# Patient Record
Sex: FEMALE | Race: BLACK OR AFRICAN AMERICAN | Employment: OTHER | URBAN - METROPOLITAN AREA
[De-identification: names, ages, dates, MRNs, and addresses within clinical notes are randomized per-mention and may not be internally consistent; named-entity substitution may affect disease eponyms.]

---

## 2017-07-03 DIAGNOSIS — I25.5 ISCHEMIC CARDIOMYOPATHY: ICD-10-CM

## 2017-07-03 DIAGNOSIS — I50.22 CHRONIC SYSTOLIC CONGESTIVE HEART FAILURE (H): ICD-10-CM

## 2017-07-03 RX ORDER — FUROSEMIDE 20 MG
20 TABLET ORAL EVERY OTHER DAY
Qty: 45 TABLET | Refills: 1 | Status: SHIPPED | OUTPATIENT
Start: 2017-07-03 | End: 2017-10-08

## 2017-07-24 ENCOUNTER — OFFICE VISIT (OUTPATIENT)
Dept: OPHTHALMOLOGY | Facility: CLINIC | Age: 77
End: 2017-07-24
Attending: OPHTHALMOLOGY
Payer: MEDICARE

## 2017-07-24 DIAGNOSIS — H52.203 HYPEROPIC ASTIGMATISM OF BOTH EYES: ICD-10-CM

## 2017-07-24 DIAGNOSIS — H52.4 PRESBYOPIA: ICD-10-CM

## 2017-07-24 PROCEDURE — 92015 DETERMINE REFRACTIVE STATE: CPT | Mod: GY,ZF

## 2017-07-24 PROCEDURE — 99213 OFFICE O/P EST LOW 20 MIN: CPT | Mod: ZF

## 2017-07-24 ASSESSMENT — CUP TO DISC RATIO
OS_RATIO: 0.25
OD_RATIO: 0.25

## 2017-07-24 ASSESSMENT — SLIT LAMP EXAM - LIDS
COMMENTS: NORMAL
COMMENTS: NORMAL

## 2017-07-24 ASSESSMENT — REFRACTION_WEARINGRX
OD_AXIS: 024
OD_SPHERE: +1.75
OD_CYLINDER: +0.50
OD_ADD: +2.75
OS_SPHERE: +1.75
OS_CYLINDER: SPHERE
OS_ADD: +2.75
SPECS_TYPE: PAL

## 2017-07-24 ASSESSMENT — REFRACTION_MANIFEST
OS_ADD: +2.75
OS_AXIS: 115
OD_SPHERE: +1.50
OD_AXIS: 050
OD_CYLINDER: +0.75
OS_CYLINDER: +0.50
OD_ADD: +2.75
OS_SPHERE: +2.00

## 2017-07-24 ASSESSMENT — EXTERNAL EXAM - LEFT EYE: OS_EXAM: NORMAL

## 2017-07-24 ASSESSMENT — VISUAL ACUITY
OS_CC: 20/50
CORRECTION_TYPE: GLASSES
OD_CC: 20/50
METHOD: SNELLEN - LINEAR
OD_CC+: +1
OS_CC+: +/-

## 2017-07-24 ASSESSMENT — CONF VISUAL FIELD
OS_NORMAL: 1
OD_NORMAL: 1
METHOD: COUNTING FINGERS

## 2017-07-24 ASSESSMENT — TONOMETRY
OS_IOP_MMHG: 13
OD_IOP_MMHG: 15
IOP_METHOD: TONOPEN

## 2017-07-24 ASSESSMENT — EXTERNAL EXAM - RIGHT EYE: OD_EXAM: NORMAL

## 2017-07-24 NOTE — NURSING NOTE
Chief Complaints and History of Present Illnesses   Patient presents with     Follow Up For     cataracts     HPI    Affected eye(s):  Both   Symptoms:     Blurred vision   Decreased vision         Do you have eye pain now?:  No      Comments:  Junie GAYTAN 2:17 PM July 24, 2017

## 2017-07-24 NOTE — MR AVS SNAPSHOT
After Visit Summary   7/24/2017    Jaja Ernandez    MRN: [id number]           Patient Information     Date Of Birth          1940        Visit Information        Provider Department      7/24/2017 2:00 PM [facility]        Today's Diagnoses     Nuclear cataract of both eyes    -  1    Hyperopic astigmatism of both eyes        Presbyopia           Follow-ups after your visit        Follow-up notes from your care team     Return in about 1 year (around 7/24/2018).      Who to contact     Please call your clinic at [phone] to:    Ask questions about your health    Make or cancel appointments    Discuss your medicines    Learn about your test results    Speak to your doctor   If you have compliments or concerns about an experience at your clinic, or if you wish to file a complaint, please contact [facility] Patient Relations at [phone] or email us at [email or web address]         Additional Information About Your Visit        MyChart Information     [employer] gives you secure access to your electronic health record. If you see a primary care provider, you can also send messages to your care team and make appointments. If you have questions, please call your primary care clinic.  If you do not have a primary care provider, please call [phone] and they will assist you.      [employer] is an electronic gateway that provides easy, online access to your medical records. With [employer], you can request a clinic appointment, read your test results, renew a prescription or communicate with your care team.     To access your existing account, please contact your [facility] or call [phone] for assistance.        Care EveryWhere ID     This is your Care EveryWhere ID. This could be used by other organizations to access your [place] medical records  [id number]         Blood Pressure from Last 3 Encounters:    10/06/16 122/80   04/05/16 117/77   12/04/15 126/79    Weight from Last 3 Encounters:   10/06/16 89.7 kg (197 lb 11.2 oz)   04/05/16 87.1 kg (192 lb)   12/04/15 85.5 kg (188 lb 9.6 oz)              Today, you had the following     No orders found for display       Primary Care Provider Office Phone # Fax #    Dave Correa [phone]       [address]  [facility] [address]        Equal Access to Services     [facility] Hospital[facility] : Hadii harshal brunson hadjazmin Sodontrell, wajonathanda luqadaha, qaybta kaalmada candie, jaren carver . So [facility] [phone].    ATENCIÓN: Si habla español, tiene a richetr disposición servicios gratuitos de asistencia lingüística. [facility] [phone].    We comply with applicable federal civil rights laws and [place] laws. We do not discriminate on the basis of race, color, national origin, age, disability sex, sexual orientation or gender identity.            Thank you!     Thank you for choosing [facility]  for your care. Our goal is always to provide you with excellent care. Hearing back from our patients is one way we can continue to improve our services. Please take a few minutes to complete the written survey that you may receive in the mail after your visit with us. Thank you!             Your Updated Medication List - Protect others around you: Learn how to safely use, store and throw away your medicines at www.disposemymeds.org.          This list is accurate as of: 7/24/17 11:59 PM.  Always use your most recent med list.                   Brand Name Dispense Instructions for use Diagnosis    amLODIPine 10 MG tablet    NORVASC    90 tablet    Take 1 tablet (10 mg) by mouth daily    Hypertension goal BP (blood pressure) < 140/90       aspirin 81 MG tablet      Take 81 mg by mouth daily        atorvastatin 20 MG tablet    LIPITOR    90 tablet    Take 1 tablet (20 mg) by mouth daily    Coronary artery disease involving  native coronary artery of native heart without angina pectoris       BC HEADACHE POWDER PO      Take 1 powder on the tongue every 6 hours with water as needed for headache        calcium-vitamin D 600-400 MG-UNIT per tablet    CALTRATE     Take 1 tablet by mouth daily        carboxymethylcellulose 0.5 % Soln ophthalmic solution    REFRESH PLUS     Place 1 drop into both eyes 3 times daily as needed for dry eyes        carvedilol 25 MG tablet    COREG    180 tablet    TAKE ONE TABLET (25MG) BY MOUTH TWICE DAILY WITH MEALS    HTN (hypertension)       CENTRUM SILVER per tablet      Take 1 tablet by mouth daily        docusate sodium 100 MG capsule    COLACE    60 capsule    Take 1 capsule (100 mg) by mouth 2 times daily as needed for constipation    Slow transit constipation       ferrous sulfate 325 (65 FE) MG tablet    IRON    60 tablet    Take 1 tablet (325 mg) by mouth 2 times daily    Iron deficiency anemia       furosemide 20 MG tablet    LASIX    45 tablet    Take 1 tablet (20 mg) by mouth every other day    Chronic systolic congestive heart failure (H), Ischemic cardiomyopathy       garlic 150 MG Tabs tablet      Take 150 mg by mouth daily        isosorbide mononitrate 60 MG 24 hr tablet    IMDUR    90 tablet    Take 1 tablet (60 mg) by mouth daily    Hypertension goal BP (blood pressure) < 140/90       lisinopril 40 MG tablet    PRINIVIL/ZESTRIL    90 tablet    Take 1 tablet (40 mg) by mouth daily    Acute systolic heart failure (H), Dyspnea, Acute kidney injury (nontraumatic) (H), CKD (chronic kidney disease) stage 3, GFR 30-59 ml/min       spironolactone 25 MG tablet    ALDACTONE    45 tablet    Take 0.5 tablets (12.5 mg) by mouth daily    Ischemic cardiomyopathy, Acute systolic heart failure (H)       VITAMIN C PO      Take 500 mg by mouth daily        vitamin E 400 UNIT capsule   Generic drug:  vitamin E      Take 400 Units by mouth daily

## 2017-07-31 NOTE — PROGRESS NOTES
HPI  Jaja Ernandez is a 77 year old female here for full eye exam. She has noted a mild continued decrease in her vision in both eyes at near and distance over the last couple of years. Her current glasses don't help as much they used to. No pain, redness, discharge. No flashes/floaters.    Assessment & Plan      (H25.13) Nuclear cataract of both eyes  (primary encounter diagnosis)  Comment: Visually significant with BCVA of 20/40 and 20/50, but she is not bothered and does not feel ready for surgery.  Plan: Observe for now    (H52.203) Hyperopic astigmatism of both eyes/(H52.4) Presbyopia  Comment: Mild improvement in vision with glasses, limited by cataract  Plan: Given updated glasses Rx.      -----------------------------------------------------------------------------------    Patient disposition:   Return in about 1 year (around 7/24/2018). or sooner as needed.    Teaching statement:  Complete documentation of historical and exam elements from today's encounter can be found in the full encounter summary report (not reduplicated in this progress note). I personally obtained the chief complaint(s) and history of present illness.  I confirmed and edited as necessary the review of systems, past medical/surgical history, family history, social history, and examination findings as documented by others; and I examined the patient myself. I personally reviewed the relevant tests, images, and reports as documented above.     I formulated and edited as necessary the assessment and plan and discussed the findings and management plan with the patient and family.    Andreia Montejo MD  Comprehensive Ophthalmology & Ocular Pathology  Department of Ophthalmology and Visual Neurosciences  [email or web address]  Pager [phone]

## 2017-10-08 DIAGNOSIS — I50.22 CHRONIC SYSTOLIC CONGESTIVE HEART FAILURE (H): ICD-10-CM

## 2017-10-08 DIAGNOSIS — R06.00 DYSPNEA: ICD-10-CM

## 2017-10-08 DIAGNOSIS — N17.9 ACUTE KIDNEY INJURY (NONTRAUMATIC) (H): ICD-10-CM

## 2017-10-08 DIAGNOSIS — I50.21 ACUTE SYSTOLIC HEART FAILURE (H): ICD-10-CM

## 2017-10-08 DIAGNOSIS — N18.30 CKD (CHRONIC KIDNEY DISEASE) STAGE 3, GFR 30-59 ML/MIN (H): ICD-10-CM

## 2017-10-08 DIAGNOSIS — I25.5 ISCHEMIC CARDIOMYOPATHY: ICD-10-CM

## 2017-10-09 RX ORDER — LISINOPRIL 40 MG/1
TABLET ORAL
Qty: 90 TABLET | Refills: 0 | Status: ON HOLD | OUTPATIENT
Start: 2017-10-09 | End: 2018-07-02

## 2017-10-09 RX ORDER — FUROSEMIDE 20 MG
TABLET ORAL
Qty: 45 TABLET | Refills: 0 | Status: ON HOLD | OUTPATIENT
Start: 2017-10-09 | End: 2018-07-02

## 2017-11-29 DIAGNOSIS — I50.21 ACUTE SYSTOLIC HEART FAILURE (H): ICD-10-CM

## 2017-11-29 DIAGNOSIS — I25.5 ISCHEMIC CARDIOMYOPATHY: ICD-10-CM

## 2017-12-14 RX ORDER — SPIRONOLACTONE 25 MG/1
12.5 TABLET ORAL DAILY
Qty: 45 TABLET | Refills: 3 | OUTPATIENT
Start: 2017-12-14

## 2017-12-19 DIAGNOSIS — I25.10 CORONARY ARTERY DISEASE INVOLVING NATIVE CORONARY ARTERY OF NATIVE HEART WITHOUT ANGINA PECTORIS: ICD-10-CM

## 2017-12-19 RX ORDER — ATORVASTATIN CALCIUM 20 MG/1
TABLET, FILM COATED ORAL
Qty: 30 TABLET | Refills: 0 | Status: ON HOLD | OUTPATIENT
Start: 2017-12-19 | End: 2018-07-02

## 2018-01-06 DIAGNOSIS — N18.30 CKD (CHRONIC KIDNEY DISEASE) STAGE 3, GFR 30-59 ML/MIN (H): ICD-10-CM

## 2018-01-06 DIAGNOSIS — R06.00 DYSPNEA: ICD-10-CM

## 2018-01-06 DIAGNOSIS — I50.21 ACUTE SYSTOLIC HEART FAILURE (H): ICD-10-CM

## 2018-01-06 DIAGNOSIS — N17.9 ACUTE KIDNEY INJURY (NONTRAUMATIC) (H): ICD-10-CM

## 2018-01-08 DIAGNOSIS — I50.22 CHRONIC SYSTOLIC CONGESTIVE HEART FAILURE (H): Primary | ICD-10-CM

## 2018-01-08 RX ORDER — LISINOPRIL 40 MG/1
TABLET ORAL
Qty: 90 TABLET | Refills: 0 | OUTPATIENT
Start: 2018-01-08

## 2018-01-27 DIAGNOSIS — I50.21 ACUTE SYSTOLIC HEART FAILURE (H): ICD-10-CM

## 2018-01-27 DIAGNOSIS — N18.30 CKD (CHRONIC KIDNEY DISEASE) STAGE 3, GFR 30-59 ML/MIN (H): ICD-10-CM

## 2018-01-27 DIAGNOSIS — I25.10 CORONARY ARTERY DISEASE INVOLVING NATIVE CORONARY ARTERY OF NATIVE HEART WITHOUT ANGINA PECTORIS: ICD-10-CM

## 2018-01-27 DIAGNOSIS — R06.00 DYSPNEA: ICD-10-CM

## 2018-01-27 DIAGNOSIS — N17.9 ACUTE KIDNEY INJURY (NONTRAUMATIC) (H): ICD-10-CM

## 2018-01-29 RX ORDER — LISINOPRIL 40 MG/1
TABLET ORAL
Qty: 90 TABLET | Refills: 0 | OUTPATIENT
Start: 2018-01-29

## 2018-01-29 RX ORDER — ATORVASTATIN CALCIUM 20 MG/1
TABLET, FILM COATED ORAL
Qty: 30 TABLET | Refills: 0 | OUTPATIENT
Start: 2018-01-29

## 2018-06-29 ENCOUNTER — APPOINTMENT (OUTPATIENT)
Dept: CARDIOLOGY | Facility: CLINIC | Age: 78
DRG: 280 | End: 2018-06-29
Attending: FAMILY MEDICINE
Payer: MEDICARE

## 2018-06-29 ENCOUNTER — APPOINTMENT (OUTPATIENT)
Dept: GENERAL RADIOLOGY | Facility: CLINIC | Age: 78
DRG: 280 | End: 2018-06-29
Attending: FAMILY MEDICINE
Payer: MEDICARE

## 2018-06-29 ENCOUNTER — HOSPITAL ENCOUNTER (INPATIENT)
Facility: CLINIC | Age: 78
LOS: 3 days | Discharge: HOME | DRG: 280 | End: 2018-07-02
Attending: FAMILY MEDICINE | Admitting: INTERNAL MEDICINE
Payer: MEDICARE

## 2018-06-29 DIAGNOSIS — I50.22 CHRONIC SYSTOLIC CONGESTIVE HEART FAILURE (H): ICD-10-CM

## 2018-06-29 DIAGNOSIS — N17.9 ACUTE RENAL FAILURE SUPERIMPOSED ON STAGE 4 CHRONIC KIDNEY DISEASE, UNSPECIFIED ACUTE RENAL FAILURE TYPE (H): ICD-10-CM

## 2018-06-29 DIAGNOSIS — N18.4 ACUTE RENAL FAILURE SUPERIMPOSED ON STAGE 4 CHRONIC KIDNEY DISEASE, UNSPECIFIED ACUTE RENAL FAILURE TYPE (H): ICD-10-CM

## 2018-06-29 DIAGNOSIS — D63.1 ANEMIA IN STAGE 3 CHRONIC KIDNEY DISEASE (H): Primary | ICD-10-CM

## 2018-06-29 DIAGNOSIS — R79.89 ELEVATED BRAIN NATRIURETIC PEPTIDE (BNP) LEVEL: ICD-10-CM

## 2018-06-29 DIAGNOSIS — R06.09 DOE (DYSPNEA ON EXERTION): ICD-10-CM

## 2018-06-29 DIAGNOSIS — R79.89 ELEVATED TROPONIN: ICD-10-CM

## 2018-06-29 DIAGNOSIS — N18.30 ANEMIA IN STAGE 3 CHRONIC KIDNEY DISEASE (H): Primary | ICD-10-CM

## 2018-06-29 DIAGNOSIS — I50.1 ACUTE PULMONARY EDEMA WITH CONGESTIVE HEART FAILURE (H): ICD-10-CM

## 2018-06-29 DIAGNOSIS — I25.5 ISCHEMIC CARDIOMYOPATHY: ICD-10-CM

## 2018-06-29 DIAGNOSIS — I50.23: ICD-10-CM

## 2018-06-29 DIAGNOSIS — R05.9 COUGH: ICD-10-CM

## 2018-06-29 PROBLEM — I50.9 HEART FAILURE (H): Status: ACTIVE | Noted: 2018-06-29

## 2018-06-29 LAB
ALBUMIN SERPL-MCNC: 3.3 G/DL (ref 3.4–5)
ALBUMIN UR-MCNC: 100 MG/DL
ALP SERPL-CCNC: 145 U/L (ref 40–150)
ALT SERPL W P-5'-P-CCNC: 254 U/L (ref 0–50)
ANION GAP SERPL CALCULATED.3IONS-SCNC: 11 MMOL/L (ref 3–14)
ANION GAP SERPL CALCULATED.3IONS-SCNC: 12 MMOL/L (ref 3–14)
APPEARANCE UR: CLEAR
APTT PPP: 33 SEC (ref 22–37)
AST SERPL W P-5'-P-CCNC: 241 U/L (ref 0–45)
BASOPHILS # BLD AUTO: 0.1 10E9/L (ref 0–0.2)
BASOPHILS NFR BLD AUTO: 1.2 %
BILIRUB SERPL-MCNC: 1 MG/DL (ref 0.2–1.3)
BILIRUB UR QL STRIP: NEGATIVE
BUN SERPL-MCNC: 32 MG/DL (ref 7–30)
BUN SERPL-MCNC: 34 MG/DL (ref 7–30)
CALCIUM SERPL-MCNC: 8.8 MG/DL (ref 8.5–10.1)
CALCIUM SERPL-MCNC: 8.8 MG/DL (ref 8.5–10.1)
CHLORIDE SERPL-SCNC: 106 MMOL/L (ref 94–109)
CHLORIDE SERPL-SCNC: 107 MMOL/L (ref 94–109)
CO2 SERPL-SCNC: 24 MMOL/L (ref 20–32)
CO2 SERPL-SCNC: 25 MMOL/L (ref 20–32)
COLOR UR AUTO: ABNORMAL
CREAT SERPL-MCNC: 3.34 MG/DL (ref 0.52–1.04)
CREAT SERPL-MCNC: 3.53 MG/DL (ref 0.52–1.04)
DIFFERENTIAL METHOD BLD: ABNORMAL
EOSINOPHIL # BLD AUTO: 0.1 10E9/L (ref 0–0.7)
EOSINOPHIL NFR BLD AUTO: 1.4 %
ERYTHROCYTE [DISTWIDTH] IN BLOOD BY AUTOMATED COUNT: 16.4 % (ref 10–15)
GFR SERPL CREATININE-BSD FRML MDRD: 13 ML/MIN/1.7M2
GFR SERPL CREATININE-BSD FRML MDRD: 13 ML/MIN/1.7M2
GLUCOSE SERPL-MCNC: 125 MG/DL (ref 70–99)
GLUCOSE SERPL-MCNC: 138 MG/DL (ref 70–99)
GLUCOSE UR STRIP-MCNC: NEGATIVE MG/DL
HCT VFR BLD AUTO: 30.9 % (ref 35–47)
HGB BLD-MCNC: 9.8 G/DL (ref 11.7–15.7)
HGB UR QL STRIP: ABNORMAL
IMM GRANULOCYTES # BLD: 0 10E9/L (ref 0–0.4)
IMM GRANULOCYTES NFR BLD: 0.4 %
INR PPP: 1.27 (ref 0.86–1.14)
KETONES UR STRIP-MCNC: NEGATIVE MG/DL
LEUKOCYTE ESTERASE UR QL STRIP: NEGATIVE
LYMPHOCYTES # BLD AUTO: 2 10E9/L (ref 0.8–5.3)
LYMPHOCYTES NFR BLD AUTO: 28.5 %
MCH RBC QN AUTO: 28.8 PG (ref 26.5–33)
MCHC RBC AUTO-ENTMCNC: 31.7 G/DL (ref 31.5–36.5)
MCV RBC AUTO: 91 FL (ref 78–100)
MONOCYTES # BLD AUTO: 0.4 10E9/L (ref 0–1.3)
MONOCYTES NFR BLD AUTO: 5.9 %
NEUTROPHILS # BLD AUTO: 4.3 10E9/L (ref 1.6–8.3)
NEUTROPHILS NFR BLD AUTO: 62.6 %
NITRATE UR QL: NEGATIVE
NRBC # BLD AUTO: 0.1 10*3/UL
NRBC BLD AUTO-RTO: 1 /100
NT-PROBNP SERPL-MCNC: ABNORMAL PG/ML (ref 0–1800)
PH UR STRIP: 5.5 PH (ref 5–7)
PLATELET # BLD AUTO: 237 10E9/L (ref 150–450)
PLATELET # BLD AUTO: 251 10E9/L (ref 150–450)
POTASSIUM SERPL-SCNC: 3.1 MMOL/L (ref 3.4–5.3)
POTASSIUM SERPL-SCNC: 3.1 MMOL/L (ref 3.4–5.3)
PROT SERPL-MCNC: 6.7 G/DL (ref 6.8–8.8)
RBC # BLD AUTO: 3.4 10E12/L (ref 3.8–5.2)
SODIUM SERPL-SCNC: 143 MMOL/L (ref 133–144)
SODIUM SERPL-SCNC: 143 MMOL/L (ref 133–144)
SOURCE: ABNORMAL
SP GR UR STRIP: 1.01 (ref 1–1.03)
TROPONIN I SERPL-MCNC: 0.11 UG/L (ref 0–0.04)
TROPONIN I SERPL-MCNC: 0.11 UG/L (ref 0–0.04)
TROPONIN I SERPL-MCNC: 0.12 UG/L (ref 0–0.04)
UROBILINOGEN UR STRIP-MCNC: NORMAL MG/DL (ref 0–2)
WBC # BLD AUTO: 6.9 10E9/L (ref 4–11)

## 2018-06-29 PROCEDURE — 25000132 ZZH RX MED GY IP 250 OP 250 PS 637: Mod: GY | Performed by: INTERNAL MEDICINE

## 2018-06-29 PROCEDURE — 80053 COMPREHEN METABOLIC PANEL: CPT | Performed by: FAMILY MEDICINE

## 2018-06-29 PROCEDURE — 84484 ASSAY OF TROPONIN QUANT: CPT | Performed by: FAMILY MEDICINE

## 2018-06-29 PROCEDURE — 93010 ELECTROCARDIOGRAM REPORT: CPT | Mod: Z6 | Performed by: FAMILY MEDICINE

## 2018-06-29 PROCEDURE — 99285 EMERGENCY DEPT VISIT HI MDM: CPT | Mod: 25 | Performed by: FAMILY MEDICINE

## 2018-06-29 PROCEDURE — 85610 PROTHROMBIN TIME: CPT | Performed by: FAMILY MEDICINE

## 2018-06-29 PROCEDURE — A9270 NON-COVERED ITEM OR SERVICE: HCPCS | Mod: GY | Performed by: INTERNAL MEDICINE

## 2018-06-29 PROCEDURE — 84300 ASSAY OF URINE SODIUM: CPT | Performed by: INTERNAL MEDICINE

## 2018-06-29 PROCEDURE — 93306 TTE W/DOPPLER COMPLETE: CPT | Mod: 26 | Performed by: INTERNAL MEDICINE

## 2018-06-29 PROCEDURE — 85049 AUTOMATED PLATELET COUNT: CPT | Performed by: INTERNAL MEDICINE

## 2018-06-29 PROCEDURE — 85025 COMPLETE CBC W/AUTO DIFF WBC: CPT | Performed by: FAMILY MEDICINE

## 2018-06-29 PROCEDURE — 83880 ASSAY OF NATRIURETIC PEPTIDE: CPT | Performed by: FAMILY MEDICINE

## 2018-06-29 PROCEDURE — 21400006 ZZH R&B CCU INTERMEDIATE UMMC

## 2018-06-29 PROCEDURE — 96374 THER/PROPH/DIAG INJ IV PUSH: CPT | Performed by: FAMILY MEDICINE

## 2018-06-29 PROCEDURE — 25000128 H RX IP 250 OP 636: Performed by: INTERNAL MEDICINE

## 2018-06-29 PROCEDURE — 81003 URINALYSIS AUTO W/O SCOPE: CPT | Performed by: INTERNAL MEDICINE

## 2018-06-29 PROCEDURE — 99223 1ST HOSP IP/OBS HIGH 75: CPT | Performed by: INTERNAL MEDICINE

## 2018-06-29 PROCEDURE — 80048 BASIC METABOLIC PNL TOTAL CA: CPT | Performed by: INTERNAL MEDICINE

## 2018-06-29 PROCEDURE — 71046 X-RAY EXAM CHEST 2 VIEWS: CPT

## 2018-06-29 PROCEDURE — 85730 THROMBOPLASTIN TIME PARTIAL: CPT | Performed by: FAMILY MEDICINE

## 2018-06-29 PROCEDURE — 93005 ELECTROCARDIOGRAM TRACING: CPT | Performed by: FAMILY MEDICINE

## 2018-06-29 PROCEDURE — 84484 ASSAY OF TROPONIN QUANT: CPT | Performed by: INTERNAL MEDICINE

## 2018-06-29 PROCEDURE — 25000132 ZZH RX MED GY IP 250 OP 250 PS 637: Mod: GY | Performed by: FAMILY MEDICINE

## 2018-06-29 PROCEDURE — 84540 ASSAY OF URINE/UREA-N: CPT | Performed by: INTERNAL MEDICINE

## 2018-06-29 PROCEDURE — A9270 NON-COVERED ITEM OR SERVICE: HCPCS | Mod: GY | Performed by: FAMILY MEDICINE

## 2018-06-29 PROCEDURE — 25000125 ZZHC RX 250: Performed by: FAMILY MEDICINE

## 2018-06-29 PROCEDURE — 25500064 ZZH RX 255 OP 636: Performed by: FAMILY MEDICINE

## 2018-06-29 PROCEDURE — 36415 COLL VENOUS BLD VENIPUNCTURE: CPT | Performed by: INTERNAL MEDICINE

## 2018-06-29 PROCEDURE — 25000128 H RX IP 250 OP 636: Performed by: FAMILY MEDICINE

## 2018-06-29 PROCEDURE — 94640 AIRWAY INHALATION TREATMENT: CPT | Performed by: FAMILY MEDICINE

## 2018-06-29 RX ORDER — CARBOXYMETHYLCELLULOSE SODIUM 5 MG/ML
1 SOLUTION/ DROPS OPHTHALMIC 3 TIMES DAILY PRN
Status: DISCONTINUED | OUTPATIENT
Start: 2018-06-29 | End: 2018-07-02 | Stop reason: HOSPADM

## 2018-06-29 RX ORDER — FUROSEMIDE 10 MG/ML
40 INJECTION INTRAMUSCULAR; INTRAVENOUS ONCE
Status: COMPLETED | OUTPATIENT
Start: 2018-06-29 | End: 2018-06-29

## 2018-06-29 RX ORDER — HEPARIN SODIUM 5000 [USP'U]/.5ML
5000 INJECTION, SOLUTION INTRAVENOUS; SUBCUTANEOUS EVERY 12 HOURS
Status: DISCONTINUED | OUTPATIENT
Start: 2018-06-29 | End: 2018-07-02 | Stop reason: HOSPADM

## 2018-06-29 RX ORDER — FUROSEMIDE 10 MG/ML
40 INJECTION INTRAMUSCULAR; INTRAVENOUS EVERY 12 HOURS
Status: DISCONTINUED | OUTPATIENT
Start: 2018-06-30 | End: 2018-07-01

## 2018-06-29 RX ORDER — NITROGLYCERIN 0.4 MG/1
0.4 TABLET SUBLINGUAL EVERY 5 MIN PRN
Status: DISCONTINUED | OUTPATIENT
Start: 2018-06-29 | End: 2018-07-02 | Stop reason: HOSPADM

## 2018-06-29 RX ORDER — ASCORBIC ACID 500 MG
500 TABLET ORAL DAILY
Status: DISCONTINUED | OUTPATIENT
Start: 2018-06-29 | End: 2018-07-02 | Stop reason: HOSPADM

## 2018-06-29 RX ORDER — IPRATROPIUM BROMIDE AND ALBUTEROL SULFATE 2.5; .5 MG/3ML; MG/3ML
3 SOLUTION RESPIRATORY (INHALATION) ONCE
Status: COMPLETED | OUTPATIENT
Start: 2018-06-29 | End: 2018-06-29

## 2018-06-29 RX ORDER — MULTIVITAMIN,THERAPEUTIC
1 TABLET ORAL DAILY
Status: DISCONTINUED | OUTPATIENT
Start: 2018-06-29 | End: 2018-07-02 | Stop reason: HOSPADM

## 2018-06-29 RX ORDER — LIDOCAINE 40 MG/G
CREAM TOPICAL
Status: DISCONTINUED | OUTPATIENT
Start: 2018-06-29 | End: 2018-07-02 | Stop reason: HOSPADM

## 2018-06-29 RX ORDER — POTASSIUM CHLORIDE 1.5 G/1.58G
20-40 POWDER, FOR SOLUTION ORAL
Status: DISCONTINUED | OUTPATIENT
Start: 2018-06-29 | End: 2018-06-29

## 2018-06-29 RX ORDER — ATORVASTATIN CALCIUM 20 MG/1
20 TABLET, FILM COATED ORAL DAILY
Status: DISCONTINUED | OUTPATIENT
Start: 2018-06-29 | End: 2018-07-02 | Stop reason: HOSPADM

## 2018-06-29 RX ORDER — FERROUS SULFATE 325(65) MG
325 TABLET ORAL 2 TIMES DAILY
Status: DISCONTINUED | OUTPATIENT
Start: 2018-06-29 | End: 2018-07-02 | Stop reason: HOSPADM

## 2018-06-29 RX ORDER — ASPIRIN 81 MG/1
81 TABLET ORAL DAILY
Status: DISCONTINUED | OUTPATIENT
Start: 2018-06-30 | End: 2018-07-02 | Stop reason: HOSPADM

## 2018-06-29 RX ORDER — POTASSIUM CL/LIDO/0.9 % NACL 10MEQ/0.1L
10 INTRAVENOUS SOLUTION, PIGGYBACK (ML) INTRAVENOUS
Status: DISCONTINUED | OUTPATIENT
Start: 2018-06-29 | End: 2018-06-29

## 2018-06-29 RX ORDER — NALOXONE HYDROCHLORIDE 0.4 MG/ML
.1-.4 INJECTION, SOLUTION INTRAMUSCULAR; INTRAVENOUS; SUBCUTANEOUS
Status: DISCONTINUED | OUTPATIENT
Start: 2018-06-29 | End: 2018-07-02 | Stop reason: HOSPADM

## 2018-06-29 RX ORDER — ASPIRIN 81 MG/1
324 TABLET, CHEWABLE ORAL ONCE
Status: COMPLETED | OUTPATIENT
Start: 2018-06-29 | End: 2018-06-29

## 2018-06-29 RX ORDER — POTASSIUM CHLORIDE 29.8 MG/ML
20 INJECTION INTRAVENOUS
Status: DISCONTINUED | OUTPATIENT
Start: 2018-06-29 | End: 2018-06-29

## 2018-06-29 RX ORDER — POTASSIUM CHLORIDE 7.45 MG/ML
10 INJECTION INTRAVENOUS
Status: DISCONTINUED | OUTPATIENT
Start: 2018-06-29 | End: 2018-06-29

## 2018-06-29 RX ORDER — POTASSIUM CHLORIDE 750 MG/1
40 TABLET, EXTENDED RELEASE ORAL ONCE
Status: COMPLETED | OUTPATIENT
Start: 2018-06-29 | End: 2018-06-29

## 2018-06-29 RX ORDER — AMLODIPINE BESYLATE 10 MG/1
10 TABLET ORAL DAILY
Status: DISCONTINUED | OUTPATIENT
Start: 2018-06-29 | End: 2018-06-30

## 2018-06-29 RX ORDER — POTASSIUM CHLORIDE 750 MG/1
20-40 TABLET, EXTENDED RELEASE ORAL
Status: DISCONTINUED | OUTPATIENT
Start: 2018-06-29 | End: 2018-06-29

## 2018-06-29 RX ORDER — DOCUSATE SODIUM 100 MG/1
100 CAPSULE, LIQUID FILLED ORAL 2 TIMES DAILY PRN
Status: DISCONTINUED | OUTPATIENT
Start: 2018-06-29 | End: 2018-07-02 | Stop reason: HOSPADM

## 2018-06-29 RX ORDER — ISOSORBIDE MONONITRATE 60 MG/1
60 TABLET, EXTENDED RELEASE ORAL DAILY
Status: DISCONTINUED | OUTPATIENT
Start: 2018-06-29 | End: 2018-06-30

## 2018-06-29 RX ADMIN — HUMAN ALBUMIN MICROSPHERES AND PERFLUTREN 6 ML: 10; .22 INJECTION, SOLUTION INTRAVENOUS at 13:15

## 2018-06-29 RX ADMIN — POTASSIUM CHLORIDE 40 MEQ: 750 TABLET, EXTENDED RELEASE ORAL at 21:57

## 2018-06-29 RX ADMIN — Medication 1 TABLET: at 17:56

## 2018-06-29 RX ADMIN — FUROSEMIDE 40 MG: 10 INJECTION, SOLUTION INTRAVENOUS at 17:56

## 2018-06-29 RX ADMIN — FERROUS SULFATE TAB 325 MG (65 MG ELEMENTAL FE) 325 MG: 325 (65 FE) TAB at 19:31

## 2018-06-29 RX ADMIN — ASPIRIN 81 MG CHEWABLE TABLET 324 MG: 81 TABLET CHEWABLE at 14:39

## 2018-06-29 RX ADMIN — OXYCODONE HYDROCHLORIDE AND ACETAMINOPHEN 500 MG: 500 TABLET ORAL at 17:56

## 2018-06-29 RX ADMIN — AMLODIPINE BESYLATE 10 MG: 10 TABLET ORAL at 17:55

## 2018-06-29 RX ADMIN — POTASSIUM CHLORIDE 40 MEQ: 750 TABLET, EXTENDED RELEASE ORAL at 17:55

## 2018-06-29 RX ADMIN — THERA TABS 1 TABLET: TAB at 17:56

## 2018-06-29 RX ADMIN — NITROGLYCERIN 0.4 MG: 0.4 TABLET, ORALLY DISINTEGRATING SUBLINGUAL at 13:57

## 2018-06-29 RX ADMIN — ATORVASTATIN CALCIUM 20 MG: 20 TABLET, FILM COATED ORAL at 17:55

## 2018-06-29 RX ADMIN — IPRATROPIUM BROMIDE AND ALBUTEROL SULFATE 3 ML: .5; 3 SOLUTION RESPIRATORY (INHALATION) at 12:38

## 2018-06-29 RX ADMIN — FUROSEMIDE 40 MG: 10 INJECTION, SOLUTION INTRAVENOUS at 13:58

## 2018-06-29 RX ADMIN — ISOSORBIDE MONONITRATE 60 MG: 60 TABLET, EXTENDED RELEASE ORAL at 17:56

## 2018-06-29 RX ADMIN — HEPARIN SODIUM 5000 UNITS: 5000 INJECTION, SOLUTION INTRAVENOUS; SUBCUTANEOUS at 17:55

## 2018-06-29 ASSESSMENT — ENCOUNTER SYMPTOMS
ABDOMINAL PAIN: 0
VOMITING: 0
HEADACHES: 0
ARTHRALGIAS: 0
EYE REDNESS: 0
DYSPHORIC MOOD: 0
SHORTNESS OF BREATH: 1
FATIGUE: 1
COUGH: 1
FEVER: 0
COLOR CHANGE: 0
WEAKNESS: 1
DIFFICULTY URINATING: 0
NAUSEA: 0
TROUBLE SWALLOWING: 0
NUMBNESS: 0
ACTIVITY CHANGE: 1
CHEST TIGHTNESS: 1
DECREASED CONCENTRATION: 0
VOICE CHANGE: 0
NECK STIFFNESS: 0
WHEEZING: 1
WOUND: 0
CONFUSION: 0
BRUISES/BLEEDS EASILY: 0

## 2018-06-29 ASSESSMENT — ACTIVITIES OF DAILY LIVING (ADL)
COGNITION: 0 - NO COGNITION ISSUES REPORTED
RETIRED_EATING: 0-->INDEPENDENT
DRESS: 0-->INDEPENDENT
TOILETING: 0-->INDEPENDENT
RETIRED_COMMUNICATION: 0-->UNDERSTANDS/COMMUNICATES WITHOUT DIFFICULTY
FALL_HISTORY_WITHIN_LAST_SIX_MONTHS: NO
BATHING: 0-->INDEPENDENT
SWALLOWING: 0-->SWALLOWS FOODS/LIQUIDS WITHOUT DIFFICULTY
AMBULATION: 0-->INDEPENDENT
TRANSFERRING: 0-->INDEPENDENT

## 2018-06-29 NOTE — LETTER
Transition Communication Hand-off for Care Transitions to Next Level of Care Provider    Name: Jaja Ernandez  : 1940  MRN #: [id number]  Primary Care Provider: Edi Ribeiro     Primary Clinic: [address] [place] [id number]     Reason for Hospitalization:  Cough [R05]  SPAIN (dyspnea on exertion) [R06.09]  Elevated troponin [R74.8]  Acute pulmonary edema with congestive heart failure (H) [I50.1]  Elevated brain natriuretic peptide (BNP) level [R79.89]  Reduced ejection fraction concurrent with and due to acute on chronic heart failure (H) [I50.23]  Acute renal failure superimposed on stage 4 chronic kidney disease, unspecified acute renal failure type (H) [N17.9, N18.4]  Admit Date/Time: 2018 12:09 PM  Discharge Date: 18  Payor Source: Payor: MEDICARE / Plan: MEDICARE / Product Type: Medicare   Readmission Assessment Measure (JONATHAN) Risk Score/category: average  Reason for Communication Hand-off Referral: Multiple providers/specialties  Discharge Plan:   Concern for non-adherence with plan of care: no  Discharge Needs Assessment:  Needs       Most Recent Value    Equipment Currently Used at Home grab bar, shower chair [Does not use shower chair.]    Transportation Available car, family or friend will provide      Follow-up specialty is recommended: Yes    Follow-up plan:  Future Appointments  Date Time Provider Department Center   7/10/2018 9:45 AM  LAB [facility]   7/10/2018 10:00 AM Kaye Chavez APRN CNP [facility]       Any outstanding tests or procedures:        Referrals     Future Labs/Procedures    Cardiac Rehab Referral     Comments:    *This therapy referral will be filtered to a centralized scheduling office at [facility] and the patient will receive a call to schedule an appointment at a [place] location most convenient for them.*     If you have not heard from the scheduling office within 2 business days, please call [phone] for all locations,  "with the exception of [place], please call [phone].    Please be aware that coverage of these services is subject to the terms and limitations of your health insurance plan.  Call member services at your health plan with any benefit or coverage questions.      **Note to Provider:  If you are referring outside of [place] for the therapy appointment, please list the name of the location in the \"special instructions\" above, print the referral and give to the patient to schedule the appointment.           Nephrology Adult Referral     Comments:    Nephrology follow up inpatient            Key Recommendations:  Post hospitalization follow up.     LINDSAY RICHARDSON RN CC      "

## 2018-06-29 NOTE — ED TRIAGE NOTES
Arrived to ED d/t c/o cough, also c/o nausea and wheezing, audible wheezing noted in triage, /115, pt ran out of her medications, she is unsure of when but daughter believes it has been about 3 months, hx of CHF, normally takes Lasix, Coreg, Spironolactone, Lisinopril, Norvasc and Imdur but has not been taking any of these, other VSS in triage

## 2018-06-29 NOTE — PROGRESS NOTES
Admission   Diagnosis: CHF exacerbation  Admitted from: [facility]   Via: stretcher   Accompanied by: family   Belongings: Placed in closet; valuables sent home with family   Admission paperwork: complete   Teaching: Call don't fall, use of console, meal times, visiting hours, orientation to unit, when to call for the RN (angina/sob/dizzyness, etc.), and the importance of safety.   Access: PIV   Telemetry:Placed on pt   Ht./Wt.: complete

## 2018-06-29 NOTE — ED PROVIDER NOTES
History     Chief Complaint   Patient presents with     Cough     HPI  Jaja Ernandez is a 78 year old female with history of systolic CHF, nonischemic cardiomyopathy, and CKD who presents to the ED with ongoing cough. Patient states she has intermittent cough for the past 3-4 weeks. She states she does sometimes cough up clear sputum and complains of some low back pain when she coughs. She also reports her activity has been poor lately and is usually very active. She states that she typically sleeps on her side and sleeps well.  She otherwise currently denies any leg swelling or previous history of asthma.   Patient was taking her medications for the last 3 months as she felt she did not have heart failure.  No fevers reported no hemoptysis or chest pain reported.  Some mild leg swelling noted.  Generalized weakness without focal complaints noted.    PAST MEDICAL HISTORY  Past Medical History:   Diagnosis Date     Cataract      Chronic renal failure, stage 3 (moderate)      Congestive heart failure, unspecified      Hypertension      Nonischemic cardiomyopathy (H) 8/25/2015     Other nonspecific abnormal cardiovascular system function study 5/15/2015     Systolic CHF (H)     LVEF 35-40% 3/2015     PAST SURGICAL HISTORY  Past Surgical History:   Procedure Laterality Date     ANGIOGRAM      3/2015     BRAIN TUMOR RESECTION  7-8-1997    Benign brain tumor     csection       HYSTERECTOMY TOTAL ABDOMINAL      benign condition     FAMILY HISTORY  Family History   Problem Relation Age of Onset     Breast Cancer Mother 50     Cancer Mother      Asthma Other      Unknown/Adopted Father      Breast Cancer Sister 70     Cancer Sister      Hypertension Daughter      Diabetes No family hx of      Cerebrovascular Disease No family hx of      Thyroid Disease No family hx of      Glaucoma No family hx of      Macular Degeneration No family hx of      SOCIAL HISTORY  Social History   Substance Use Topics     Smoking status:  Never Smoker     Smokeless tobacco: Never Used     Alcohol use No     MEDICATIONS  Current Facility-Administered Medications   Medication     lidocaine (LMX4) cream     lidocaine 1 % 1 mL     nitroGLYcerin (NITROSTAT) sublingual tablet 0.4 mg     sodium chloride (PF) 0.9% PF flush 3 mL     sodium chloride (PF) 0.9% PF flush 3 mL     Current Outpatient Prescriptions   Medication     amLODIPine (NORVASC) 10 MG tablet     Ascorbic Acid (VITAMIN C PO)     aspirin 81 MG tablet     Aspirin-Salicylamide-Caffeine (BC HEADACHE POWDER PO)     atorvastatin (LIPITOR) 20 MG tablet     calcium-vitamin D (CALTRATE) 600-400 MG-UNIT per tablet     carboxymethylcellulose (REFRESH PLUS) 0.5 % SOLN     carvedilol (COREG) 25 MG tablet     docusate sodium (COLACE) 100 MG capsule     ferrous sulfate (IRON) 325 (65 FE) MG tablet     furosemide (LASIX) 20 MG tablet     garlic 150 MG TABS     isosorbide mononitrate (IMDUR) 60 MG 24 hr tablet     lisinopril (PRINIVIL/ZESTRIL) 40 MG tablet     Multiple Vitamins-Minerals (CENTRUM SILVER) per tablet     spironolactone (ALDACTONE) 25 MG tablet     vitamin E (VITAMIN E-400) 400 UNIT capsule     ALLERGIES  Allergies   Allergen Reactions     Demerol [Meperidine] Nausea and Vomiting       I have reviewed the Medications, Allergies, Past Medical and Surgical History, and Social History in the Epic system.    Review of Systems   Constitutional: Positive for activity change (decreased activity) and fatigue. Negative for fever.   HENT: Negative for congestion, trouble swallowing and voice change.    Eyes: Negative for redness.   Respiratory: Positive for cough (intermittent), chest tightness, shortness of breath and wheezing.    Cardiovascular: Positive for leg swelling. Negative for chest pain.   Gastrointestinal: Negative for abdominal pain, nausea and vomiting.   Genitourinary: Negative for difficulty urinating.   Musculoskeletal: Negative for arthralgias, gait problem and neck stiffness.   Skin:  Negative for color change, rash and wound.   Allergic/Immunologic: Negative for immunocompromised state.   Neurological: Positive for weakness. Negative for syncope, numbness and headaches.   Hematological: Does not bruise/bleed easily.   Psychiatric/Behavioral: Negative for confusion, decreased concentration and dysphoric mood.   All other systems reviewed and are negative.      Physical Exam   BP: (!) 196/115  Pulse: 89  Heart Rate: 93  Temp: 98  F (36.7  C)  Resp: 18  Weight: 89.7 kg (197 lb 12 oz)  SpO2: 96 %      Physical Exam   Constitutional: She is oriented to person, place, and time. She appears well-developed and well-nourished. She appears distressed.   Patient with daughter patient mild distress but speaking full sentences   HENT:   Head: Normocephalic and atraumatic.   Eyes: EOM are normal. Pupils are equal, round, and reactive to light. No scleral icterus.   Neck: Normal range of motion. Neck supple. JVD present. No tracheal deviation present.   Cardiovascular: Normal rate and regular rhythm.    Murmur heard.  Pulmonary/Chest: No stridor. She has rales.   Abdominal: There is no rebound and no guarding.   Musculoskeletal: She exhibits edema. She exhibits no tenderness or deformity.   Neurological: She is alert and oriented to person, place, and time. She has normal reflexes.   Skin: Skin is warm and dry. No rash noted. She is not diaphoretic. No erythema. No pallor.   Psychiatric: She has a normal mood and affect. Her behavior is normal. Judgment and thought content normal.   Nursing note and vitals reviewed.      ED Course     ED Course     Procedures   12:20 PM  The patient was seen and examined by Dr. Rivers in Room 20.   Patient value in ER patient appears relatively stable EKG was done revealing premature supraventricular contractions otherwise old anterior lateral ST changes identified.  Records in epic reviewed patient without ischemic cardia myopathy noted history of heart failure also.    Chest  x-ray done reveals increasing interstitial edema with left pleural effusion.  Laboratory evaluation did reveal a white count 6.9 hemoglobin 9.8.  Platelets are 251.  INR 1.27.  Sodium 143 potassium 3.1 glucose 125 creatinine now is elevated 3.34 units 32.  Patient does have history of chronic kidney disease more exacerbation noted.  AST and ALT are also elevated approximately 250.  BNP is 52,130 troponin is 0.114 which is been elevated in the past before.    Patient received 40 Lasix IV along with this 3-24 mg of aspirin orally and one nitroglycerin sublingual.  Patient blood pressure been quite hypertensive some improvement noted to 180s over 100.  Patient denies headache no chest pain otherwise stable.    Formal echocardiogram was ordered also.  Results initially were pending.  I talked to Dr. Richardson with cardiology along with the fellow.  We will plan to admit patient does agree to come in the hospital for the next few days to manage her ongoing symptoms which I believe are certainly exacerbation of heart failure.             EKG Interpretation:      Interpreted by Troy Rivers  Time reviewed: 1223  Symptoms at time of EKG: sob   Rhythm: normal sinus w psvc  Rate: normal  Axis: normal  Ectopy: none  Conduction: normal  ST Segments/ T Waves: ant later ST-T wave changes old  Q Waves: none  Comparison to prior: Unchanged    Clinical Impression: nsr with psvc with old ant lat st changes          Critical Care time:  none             Labs Ordered and Resulted from Time of ED Arrival Up to the Time of Departure from the ED   CBC WITH PLATELETS DIFFERENTIAL - Abnormal; Notable for the following:        Result Value    RBC Count 3.40 (*)     Hemoglobin 9.8 (*)     Hematocrit 30.9 (*)     RDW 16.4 (*)     Nucleated RBCs 1 (*)     All other components within normal limits   INR - Abnormal; Notable for the following:     INR 1.27 (*)     All other components within normal limits   COMPREHENSIVE METABOLIC PANEL -  Abnormal; Notable for the following:     Potassium 3.1 (*)     Glucose 125 (*)     Urea Nitrogen 32 (*)     Creatinine 3.34 (*)     GFR Estimate 13 (*)     GFR Estimate If Black 16 (*)     Albumin 3.3 (*)     Protein Total 6.7 (*)      (*)      (*)     All other components within normal limits   NT PROBNP INPATIENT - Abnormal; Notable for the following:     N-Terminal Pro BNP Inpatient 53711 (*)     All other components within normal limits   TROPONIN I - Abnormal; Notable for the following:     Troponin I ES 0.114 (*)     All other components within normal limits   PARTIAL THROMBOPLASTIN TIME   PULSE OXIMETRY NURSING   CARDIAC CONTINUOUS MONITORING   PERIPHERAL IV CATHETER     Results for orders placed or performed during the hospital encounter of 06/29/18   XR Chest 2 Views    Narrative    Exam: XR CHEST 2 VW, 6/29/2018 1:36 PM    Indication: cough and sob;     Comparison: Radiograph of the chest 3/20/2015    Findings:   PA and lateral views of the chest. Cardiac silhouette is enlarged,  stable from the prior exam. Pulmonary vasculature is distinct but with  perihilar opacities. Interstitial, bibasilar, and retrocardiac patchy  opacities. Small left pleural effusion. No pneumothorax. The upper  abdomen is unremarkable.      Impression    Impression:  Interstitial pulmonary edema with a small left pleural effusion.    I have personally reviewed the examination and initial interpretation  and I agree with the findings.    SESAR HOPKINS MD   CBC with platelets differential   Result Value Ref Range    WBC 6.9 4.0 - 11.0 10e9/L    RBC Count 3.40 (L) 3.8 - 5.2 10e12/L    Hemoglobin 9.8 (L) 11.7 - 15.7 g/dL    Hematocrit 30.9 (L) 35.0 - 47.0 %    MCV 91 78 - 100 fl    MCH 28.8 26.5 - 33.0 pg    MCHC 31.7 31.5 - 36.5 g/dL    RDW 16.4 (H) 10.0 - 15.0 %    Platelet Count 251 150 - 450 10e9/L    Diff Method Automated Method     % Neutrophils 62.6 %    % Lymphocytes 28.5 %    % Monocytes 5.9 %    % Eosinophils  1.4 %    % Basophils 1.2 %    % Immature Granulocytes 0.4 %    Nucleated RBCs 1 (H) 0 /100    Absolute Neutrophil 4.3 1.6 - 8.3 10e9/L    Absolute Lymphocytes 2.0 0.8 - 5.3 10e9/L    Absolute Monocytes 0.4 0.0 - 1.3 10e9/L    Absolute Eosinophils 0.1 0.0 - 0.7 10e9/L    Absolute Basophils 0.1 0.0 - 0.2 10e9/L    Abs Immature Granulocytes 0.0 0 - 0.4 10e9/L    Absolute Nucleated RBC 0.1    INR   Result Value Ref Range    INR 1.27 (H) 0.86 - 1.14   Partial thromboplastin time   Result Value Ref Range    PTT 33 22 - 37 sec   Comprehensive metabolic panel   Result Value Ref Range    Sodium 143 133 - 144 mmol/L    Potassium 3.1 (L) 3.4 - 5.3 mmol/L    Chloride 106 94 - 109 mmol/L    Carbon Dioxide 25 20 - 32 mmol/L    Anion Gap 12 3 - 14 mmol/L    Glucose 125 (H) 70 - 99 mg/dL    Urea Nitrogen 32 (H) 7 - 30 mg/dL    Creatinine 3.34 (H) 0.52 - 1.04 mg/dL    GFR Estimate 13 (L) >60 mL/min/1.7m2    GFR Estimate If Black 16 (L) >60 mL/min/1.7m2    Calcium 8.8 8.5 - 10.1 mg/dL    Bilirubin Total 1.0 0.2 - 1.3 mg/dL    Albumin 3.3 (L) 3.4 - 5.0 g/dL    Protein Total 6.7 (L) 6.8 - 8.8 g/dL    Alkaline Phosphatase 145 40 - 150 U/L     (H) 0 - 50 U/L     (H) 0 - 45 U/L   Nt probnp inpatient (BNP)   Result Value Ref Range    N-Terminal Pro BNP Inpatient 34284 (H) 0 - 1800 pg/mL   Troponin I   Result Value Ref Range    Troponin I ES 0.114 (H) 0.000 - 0.045 ug/L   ECHO COMPLETE WITH OPTISON    Narrative    [id number]  [id number]  [id number]  [id number]^[id number]^AMISHA^MARYJO           [facility],[place]  Echocardiography Laboratory  [address] [place] [id number]     Name: SAMI ECKERT  MRN: [id number]  : 1940  Study Date: 2018 12:39 PM  Age: 78 yrs  Gender: Female  Patient Location: [facility]  Reason For Study: CHF  Ordering Physician: AMISHA DYE  Performed By: Zay Owens RDCS     BSA: 1.9 m2  Height: 60 in  Weight: 200 lb  HR: 93  BP: 195/124  mmHg  _____________________________________________________________________________  __        Procedure  Complete Portable Echo Adult. Contrast Optison. Optison (NDC #2794-4443-02)  given intravenously. Patient was given 6 ml mixture of 3 ml Optison and 6 ml  saline. 3 ml wasted.  _____________________________________________________________________________  __        Interpretation Summary  Prominent left ventricular hypertrophy with global hypokinesis and severe left  ventricular systolic dysfunction (EF 20-30%).  Moderate to severe mitral valve regurgitation with central jet.  Severe tricuspid valve regurgitation with central jet.  Severe pulmonary hypertension with dilated IVC and right ventricular systolic  dysfunction.  _____________________________________________________________________________  __        Left Ventricle  Mild to Moderate left ventricular dilation is present. Moderate to severe  concentric wall thickening consistent with left ventricular hypertrophy is  present. Severely (EF 20-30%) reduced left ventricular function is present.  The Ejection Fraction was calculated using Bi-plane contrast. LVEF 24% based  on biplane tracing. Moderate to severe diffuse hypokinesis is present.  Prominent trabeculation. Diastolic function is abnormal but unable to grade  severity due to mitral regurgitation.     Right Ventricle  Mild to moderate right ventricular dilation is present. Global right  ventricular function is mildly to moderately reduced.     Atria  Moderate biatrial enlargement is present.     Mitral Valve  Mild mitral annular calcification is present. Moderate to severe mitral  insufficiency is present. The cause of the mitral insufficiency appears to be  altered left ventricular size and geometry.        Aortic Valve  Aortic valve is normal in structure and function. Trileaflet aortic sclerosis  without stenosis. No aortic regurgitation is present.     Tricuspid Valve  Severe tricuspid  insufficiency is present. The right ventricular systolic  pressure is approximated at 67.2 mmHg plus the right atrial pressure. Severe  (pulmonary artery systolic pressure >75mmHg) pulmonary hypertension is  present.     Pulmonic Valve  Mild pulmonic insufficiency is present.     Vessels  The aorta root is normal. The inferior vena cava was dilated at 2.5 cm without  respiratory variability, consistent with increased right atrial pressure.  Estimated mean right atrial pressure is 15 mmHg (significantly elevated).     Pericardium  Trivial pericardial effusion is present.        Compared to Previous Study  The left ventricular function has worsened. Mitral insufficiency has worsened.  _____________________________________________________________________________  __  MMode/2D Measurements & Calculations     IVSd: 1.6 cm  LVIDd: 5.2 cm  LVIDs: 4.4 cm  LVPWd: 1.6 cm  FS: 15.4 %  LV mass(C)d: 379.6 grams  LV mass(C)dI: 203.4 grams/m2  asc Aorta Diam: 3.5 cm  LA Volume (BP): 106.0 ml  LA Volume Index (BP): 56.7 ml/m2  RWT: 0.61           Doppler Measurements & Calculations  MV E max meri: 93.6 cm/sec  MV A max meri: 64.5 cm/sec  MV E/A: 1.5  MV dec slope: 612.0 cm/sec2  PA acc time: 0.08 sec  TR max meri: 410.0 cm/sec  TR max P.2 mmHg  E/E' av.0  Lateral E/e': 26.1  Medial E/e': 31.8     _____________________________________________________________________________  __           Report approved by: Opal Cervantes 2018 03:30 PM            Consults  Cardiology: Responded ([id number])    Assessments & Plan (with Medical Decision Making)  78-year-old female history of systolic heart failure nonischemic cardiac myopathy chronic kidney disease presents to the ER with 2 weeks of increasing shortness of breath cough fatigue.  Patient quit taking all her medications the last 3 months per her choice.  Now presents with daughter.  Patient was evaluated in the ER vitals were stable somewhat mildly hypertensive  did improve with a nitro sublingual along with his Lasix IV and aspirin orally.  Patient did receive a DuoNeb also in the ER which did improve her breathing originally also.  Chest x-ray consistent with CHF EKG without ischemic changes patient worsening chronic kidney disease with now creatinine at 3.34.  BNP is 52,000 troponin is 0.114.  Discussed case with cardiology.  Patient will be admitted to [facility] 1 service discussed with fellow also.  I have attempted to page the patient again as patient's echo does show deterioration in EF along with this moderate tricuspid and mitral regurg noted too.  Pulmonary hypertension also seen.         I have reviewed the nursing notes.    I have reviewed the findings, diagnosis, plan and need for follow up with the patient.    New Prescriptions    No medications on file       Final diagnoses:   Acute pulmonary edema with congestive heart failure (H)   Reduced ejection fraction concurrent with and due to acute on chronic heart failure (H)   Acute renal failure superimposed on stage 4 chronic kidney disease, unspecified acute renal failure type (H)   SPAIN (dyspnea on exertion)   Cough   Elevated troponin   Elevated brain natriuretic peptide (BNP) level     I, Yash Rubin, am serving as a trained medical scribe to document services personally performed by Troy Rivers MD, based on the provider's statements to me.      ITroy MD, was physically present and have reviewed and verified the accuracy of this note documented by Yash Rubin.     6/29/2018   [facility], [place], EMERGENCY DEPARTMENT    This note was created at least in part by the use of dragon voice dictation system. Inadvertent typographical errors may still exist.  Troy Rivers MD.         Troy Rivers MD  06/29/18 [id number]

## 2018-06-29 NOTE — IP AVS SNAPSHOT
MRN:[id number]                      After Visit Summary   6/29/2018    Jaja Ernandez    MRN: [id number]           Thank you!     Thank you for choosing [facility] for your care. Our goal is always to provide you with excellent care. Hearing back from our patients is one way we can continue to improve our services. Please take a few minutes to complete the written survey that you may receive in the mail after you visit with us. Thank you!        Patient Information     Date Of Birth          1940        Designated Caregiver       Most Recent Value    Caregiver    Will someone help with your care after discharge? yes    Name of designated caregiver jeremias johnson    Phone number of caregiver [phone]    Caregiver address [place]      About your hospital stay     You were admitted on:  June 29, 2018 You last received care in the:  [address] [facility]    You were discharged on:  July 2, 2018        Reason for your hospital stay       Acute kidney injury and acute heart failure                  Who to Call     For medical emergencies, please call 911.  For non-urgent questions about your medical care, please call your primary care provider or clinic, [phone]          Attending Provider     Provider Specialty    Troy Rivers MD Emergency Medicine    [facility]iology       Primary Care Provider Office Phone # Fax #    Edi Ribeiro [phone]       When to contact your care team       Call your primary doctor if you have any of the following:  increased shortness of breath, chest pain, palpitation etc.                  After Care Instructions     Activity       Your activity upon discharge: activity as tolerated            Diet       Follow this diet upon discharge: Orders Placed This Encounter     2 gm NA Diet, 2 L fluid restriction            Discharge Instructions       Please follow heart failure education instruction                   Follow-up Appointments     [facility] Follow-up and recommended labs and tests       - Follow up with CORE clinic (Heart failure clinic) within 1 week, labs draw at visit   - Follow up with nephrology clinic within 2-3 weeks     Appointments on [place] and/or [facility] (with [facility] provider or service). Call [phone] if you haven't heard regarding these appointments within 7 days of discharge.                  Your next 10 appointments already scheduled     Jul 10, 2018  9:45 AM CDT   Lab with  LAB   [facility] ([facility])    [address]  1st Floor  [facility] [place] [facility] [id number] [phone]            Jul 10, 2018 10:00 AM CDT   (Arrive by 9:45 AM)   CORE RETURN with KIKE Rivera [facility] ([facility])    [address]  [address]  [facility] [place] [id number] [phone]              Additional Services     Nephrology Adult Referral       Nephrology follow up inpatient                  General Recommendations To Control Heart Failure When You Get Home       Heart Failure Instructions for Patients and Families: Please read and check off each of these important instructions as you do them when you get home.          Weight and Symptoms       ___ Put a scale in your bathroom.    ___ Post a weight chart or calendar next to your scale.    ___ Weigh yourself everyday as soon as you get up in the morning (before breakfast).  You should only be wearing your pajamas.  Write your weight on the chart/calendar.  ___ Bring your weight chart/calendar with you to all appointments.  ___ Call your doctor or nurse practitioner if you gain 2 pounds (in 1 day) or 5 pounds in (1 week) from your goal  good  weight.  Your good weight is also called your  dry  weight.  Your doctor or nurse will tell you what your good weight should be.    ___ Call your doctor or nurse practitioner if you have shortness of breath that gets  worse over time, leg swelling or fatigue.       Medications and Diet       ___ Make sure to take your medication as prescribed.    ___ Bring a current list of your medication and all of your medicine bottles with you to all appointments.    ___ Limit fluids if you still have swelling or shortness of breath, or if your doctor tells you to do so.    ___ Eat less than 2000 mg of sodium (salt) every day. Read food labels, and do not add salt to meals. Remember, if you eat less salt you retain less fluid.  ___ Follow a heart healthy diet that is low in saturated fat.        Activity and Suggested Lifestyle Changes      ___ Stay active. Talk to your doctor about an exercise program that is safe for your heart.  ___ Stop smoking. Reduce alcohol use.      ___ Lose weight if you are overweight. Extra weight puts a lot of stress on the heart.                 Control for Leg Swelling     ___ Keep your legs elevated (raised) as needed for swelling. If swelling is uncomfortable or elevation doesn t help, ask your doctor about using ACE wraps or support stockings.        What is the [facility].O.R[facility]E. Clinic?  Cardiomyopathy  Optimization  Rehabilitation  Education    The [facility].R[facility] Clinic is a heart failure specialty clinic within [facility].  It is an outpatient disease management program that is based on a phase-by-phase approach, which is tailored to each patient s individual needs.  The cardiologist, nurse practitioner, physician assistant and nurses provide an ongoing outpatient care and treatment plan that guides heart failure and cardiomyopathy patients from evaluation and education to stabilization. This team works with your current primary care doctor and cardiologist to help you:    - Avoid hospitalizations  - Slow the progression of the disease  - Improve length and quality of life  - Know who and when to call if heart failure symptoms appear  - Receive easy access to quality health care and advice  - Better understand  your condition and treatment  - Decrease the tremendous cost burden of heart failure care  - Detect future heart problems before they become life threatening                                 Follow-up Appointments     ___ An appointment with the [facility].R.E[facility] Clinic may already have been made for you (see section   Your next appointments already scheduled ).  If you have a question about your appointment, would like to speak with a C.O.R.E. nurse, or would like to become a [facility].O.R.E. Clinic patient, please call one of the following locations:     - [facility]):                                             [phone]  - [facility]):                                            [phone]  - [facility] ([place]):                 [phone]      ___ Your C.O.R.E. Clinic Team will continue to educate you on your heart failure and may adjust medications based on your vital signs, lab work, and how you are feeling.  Therefore, it is very important to bring the following to all C.O.R.E. appointments:    - An accurate list of your medications  - Your medicine bottles  - Your weight chart/calendar                   Pending Results     Date and Time Order Name Status Description    7/2/2018 1125 Aldosterone In process     7/2/2018 1125 Renin Plasma In process     7/1/2018 1440 Blood Morphology Pathologist Review In process     7/1/2018 1440 Renin Plasma In process     7/1/2018 1440 Aldosterone In process             Statement of Approval     Ordered          07/02/18 1355  I have reviewed and agree with all the recommendations and orders detailed in this document.  EFFECTIVE NOW     Approved and electronically signed by:  Camelia Ny MD             Admission Information     Date & Time Provider Department Dept. Phone    6/29/2018 Mic Ochoa MD [address] [facility] [phone]      Your Vitals Were     Blood Pressure Pulse  Temperature Respirations Height Weight    136/81 (BP Location: Right arm) 102 97.7  F (36.5  C) (Oral) 20 1.524 m (5') 83.1 kg (183 lb 1.6 oz)    Pulse Oximetry BMI (Body Mass Index)                95% 35.76 kg/m2          MyChart Information     [email or web address] gives you secure access to your electronic health record. If you see a primary care provider, you can also send messages to your care team and make appointments. If you have questions, please call your primary care clinic.  If you do not have a primary care provider, please call [phone] and they will assist you.        Care EveryWhere ID     This is your Care EveryWhere ID. This could be used by other organizations to access your [facility] records  [id number]        Equal Access to Services     NADYA JOHNSON : Sri [place]ano, nel mcintosh, jaren tony. So [facility] [phone].    ATENCIÓN: Si habla español, tiene a richter disposición servicios gratuitos de asistencia lingüística. Llame al [phone].    We comply with applicable federal civil rights laws and [place] laws. We do not discriminate on the basis of race, color, national origin, age, disability, sex, sexual orientation, or gender identity.               Review of your medicines      START taking        Dose / Directions    hydrALAZINE 50 MG tablet   Commonly known as:  APRESOLINE   Used for:  Acute pulmonary edema with congestive heart failure (H)        Dose:  50 mg   Take 1 tablet (50 mg) by mouth 3 times daily   Quantity:  60 tablet   Refills:  0         CONTINUE these medicines which may have CHANGED, or have new prescriptions. If we are uncertain of the size of tablets/capsules you have at home, strength may be listed as something that might have changed.        Dose / Directions    carvedilol 6.25 MG tablet   Commonly known as:  COREG   This may have changed:    - medication strength  - See the new instructions.    Used for:  Acute pulmonary edema with congestive heart failure (H)        Dose:  6.25 mg   Take 1 tablet (6.25 mg) by mouth 2 times daily (with meals)   Quantity:  60 tablet   Refills:  0       furosemide 40 MG tablet   Commonly known as:  LASIX   This may have changed:    - medication strength  - See the new instructions.   Used for:  Acute pulmonary edema with congestive heart failure (H)        Dose:  40 mg   Take 1 tablet (40 mg) by mouth 2 times daily   Quantity:  30 tablet   Refills:  0         CONTINUE these medicines which have NOT CHANGED        Dose / Directions    amLODIPine 10 MG tablet   Commonly known as:  NORVASC   Used for:  Hypertension goal BP (blood pressure) < 140/90        Dose:  10 mg   Take 1 tablet (10 mg) by mouth daily   Quantity:  90 tablet   Refills:  3       aspirin 81 MG tablet        Dose:  81 mg   Take 81 mg by mouth daily   Refills:  0       atorvastatin 20 MG tablet   Commonly known as:  LIPITOR   Used for:  Coronary artery disease involving native coronary artery of native heart without angina pectoris        TAKE ONE TABLET BY MOUTH ONCE DAILY   Quantity:  30 tablet   Refills:  0       BC HEADACHE POWDER PO        Take 1 powder on the tongue every 6 hours with water as needed for headache   Refills:  0       calcium-vitamin D 600-400 MG-UNIT per tablet   Commonly known as:  CALTRATE        Dose:  1 tablet   Take 1 tablet by mouth daily   Refills:  0       CENTRUM SILVER per tablet        Dose:  1 tablet   Take 1 tablet by mouth daily   Refills:  0       docusate sodium 100 MG capsule   Commonly known as:  COLACE   Used for:  Slow transit constipation        Dose:  100 mg   Take 1 capsule (100 mg) by mouth 2 times daily as needed for constipation   Quantity:  60 capsule   Refills:  0       ferrous sulfate 325 (65 Fe) MG tablet   Commonly known as:  IRON   Used for:  Iron deficiency anemia        Dose:  325 mg   Take 1 tablet (325 mg) by mouth 2 times daily   Quantity:  60 tablet    Refills:  3       garlic 150 MG Tabs tablet   Notes to Patient:  Per home schedule        Dose:  150 mg   Take 150 mg by mouth daily   Refills:  0       isosorbide mononitrate 60 MG 24 hr tablet   Commonly known as:  IMDUR   Used for:  Hypertension goal BP (blood pressure) < 140/90        Dose:  60 mg   Take 1 tablet (60 mg) by mouth daily   Quantity:  90 tablet   Refills:  3       VITAMIN C PO        Dose:  500 mg   Take 500 mg by mouth daily   Refills:  0       vitamin E 400 UNIT capsule   Generic drug:  vitamin E   Notes to Patient:  Per home schedule        Dose:  400 Units   Take 400 Units by mouth daily   Refills:  0         STOP taking     lisinopril 40 MG tablet   Commonly known as:  PRINIVIL/ZESTRIL           spironolactone 25 MG tablet   Commonly known as:  ALDACTONE                Where to get your medicines      These medications were sent to [facility] [address] [place] [id number]     Phone:  [phone]     carvedilol 6.25 MG tablet    furosemide 40 MG tablet    hydrALAZINE 50 MG tablet                Protect others around you: Learn how to safely use, store and throw away your medicines at www.disposemymeds.org.             Medication List: This is a list of all your medications and when to take them. Check marks below indicate your daily home schedule. Keep this list as a reference.      Medications           Morning Afternoon Evening Bedtime As Needed    amLODIPine 10 MG tablet   Commonly known as:  NORVASC   Take 1 tablet (10 mg) by mouth daily   Last time this was given:  10 mg on 7/1/2018  9:41 PM                                   aspirin 81 MG tablet   Take 81 mg by mouth daily                                   atorvastatin 20 MG tablet   Commonly known as:  LIPITOR   TAKE ONE TABLET BY MOUTH ONCE DAILY   Last time this was given:  20 mg on 7/2/2018  9:34 AM                                   BC HEADACHE POWDER PO   Take 1  powder on the tongue every 6 hours with water as needed for headache                                   calcium-vitamin D 600-400 MG-UNIT per tablet   Commonly known as:  CALTRATE   Take 1 tablet by mouth daily   Last time this was given:  1 tablet on 7/2/2018  9:33 AM                                   carvedilol 6.25 MG tablet   Commonly known as:  COREG   Take 1 tablet (6.25 mg) by mouth 2 times daily (with meals)   Last time this was given:  6.25 mg on 7/2/2018 12:36 PM                                      CENTRUM SILVER per tablet   Take 1 tablet by mouth daily                                   docusate sodium 100 MG capsule   Commonly known as:  COLACE   Take 1 capsule (100 mg) by mouth 2 times daily as needed for constipation   Last time this was given:  100 mg on 7/1/2018  8:10 PM                                   ferrous sulfate 325 (65 Fe) MG tablet   Commonly known as:  IRON   Take 1 tablet (325 mg) by mouth 2 times daily   Last time this was given:  325 mg on 7/2/2018  9:34 AM                                      furosemide 40 MG tablet   Commonly known as:  LASIX   Take 1 tablet (40 mg) by mouth 2 times daily   Last time this was given:  40 mg on 7/2/2018  9:34 AM                                      garlic 150 MG Tabs tablet   Take 150 mg by mouth daily   Notes to Patient:  Per home schedule                                hydrALAZINE 50 MG tablet   Commonly known as:  APRESOLINE   Take 1 tablet (50 mg) by mouth 3 times daily   Last time this was given:  50 mg on 7/2/2018  1:50 PM                                         isosorbide mononitrate 60 MG 24 hr tablet   Commonly known as:  IMDUR   Take 1 tablet (60 mg) by mouth daily   Last time this was given:  60 mg on 7/2/2018  9:34 AM                                   VITAMIN C PO   Take 500 mg by mouth daily   Last time this was given:  500 mg on 7/2/2018  9:34 AM                                   vitamin E 400 UNIT capsule   Take 400 Units by mouth daily    Generic drug:  vitamin E   Notes to Patient:  Per home schedule

## 2018-06-29 NOTE — H&P
[facility], [place]    Cardiology History and Physical - Cards 1       Date of Admission:  6/29/2018    Chief Complaint   Fatigue    History is obtained from the patient, patient's daughter, and chart review    History of Present Illness   Jaja Ernandez is a 78 year old female with h/o HFrEF (due to HTN, Dx 3/2015 EF 35%, recovered to 49% in 6/2015), CKD stage III who presents to ED with family for 3+ weeks of fatigue, decreased exercise tolerance, SOB and cough.     She stopped taking her BP meds about 2-3 months ago to try a home herbal regimen (tumeric and blood pressure herbal tea). She is vague about symptoms but family reports that as of 3 weeks ago she was unable to walk more than 3 steps without stopping due to fatigue. Since that time she has only worsened and has been lying in bed most of the time. She previously had been very active especially attending [employer] orth[employer] events.     Her SOB intermittent and only with exertion. She denies PND or orthopnea, no leg swelling, no palpitations. Her fatigued she describes similar to lightheadedness. She did have pleuritic chest pain associated with coughing located in the right posterior ribs. No substernal chest discomfort. She has noticed about a 15lb weight gain over the last few months.     Review of Systems   + Dry mouth and thirst   + Snoring  + Blurry vision x months    The 10 point Review of Systems is negative other than noted in the HPI or here.     Past Medical History    Breast cancer in remission, no onc notes available  HTN  CKD stage III  HFrEF due to HTN  Non-obstructive CAD.   EF 35%-->49% with medical management in 2015    Past Surgical History   Hysterectomy in 1970 for benign dz  Benign brain tumor resected in 1997, no residual deficits    Social History   Lives in an appartment  Denies EtOH or tobacco    Daughter supportive     Family History   Father d. Heart disease in his 70s  Mother, sister d. Breast  cancer    Prior to Admission Medications   Prior to Admission Medications   Prescriptions Last Dose Informant Patient Reported? Taking?   Ascorbic Acid (VITAMIN C PO)  Self Yes No   Sig: Take 500 mg by mouth daily    Aspirin-Salicylamide-Caffeine (BC HEADACHE POWDER PO)  Self Yes No   Sig: Take 1 powder on the tongue every 6 hours with water as needed for headache   Multiple Vitamins-Minerals (CENTRUM SILVER) per tablet  Self Yes No   Sig: Take 1 tablet by mouth daily   amLODIPine (NORVASC) 10 MG tablet   No No   Sig: Take 1 tablet (10 mg) by mouth daily   aspirin 81 MG tablet  Self Yes No   Sig: Take 81 mg by mouth daily    atorvastatin (LIPITOR) 20 MG tablet   No No   Sig: TAKE ONE TABLET BY MOUTH ONCE DAILY   calcium-vitamin D (CALTRATE) 600-400 MG-UNIT per tablet  Self Yes No   Sig: Take 1 tablet by mouth daily   carboxymethylcellulose (REFRESH PLUS) 0.5 % SOLN  Self Yes No   Sig: Place 1 drop into both eyes 3 times daily as needed for dry eyes   carvedilol (COREG) 25 MG tablet   No No   Sig: TAKE ONE TABLET (25MG) BY MOUTH TWICE DAILY WITH MEALS   docusate sodium (COLACE) 100 MG capsule   No No   Sig: Take 1 capsule (100 mg) by mouth 2 times daily as needed for constipation   ferrous sulfate (IRON) 325 (65 FE) MG tablet  Self No No   Sig: Take 1 tablet (325 mg) by mouth 2 times daily   furosemide (LASIX) 20 MG tablet   No No   Sig: TAKE ONE TABLET BY MOUTH EVERY OTHER DAY   garlic 150 MG TABS  Self Yes No   Sig: Take 150 mg by mouth daily   isosorbide mononitrate (IMDUR) 60 MG 24 hr tablet   No No   Sig: Take 1 tablet (60 mg) by mouth daily   lisinopril (PRINIVIL/ZESTRIL) 40 MG tablet   No No   Sig: TAKE ONE TABLET BY MOUTH ONCE DAILY   spironolactone (ALDACTONE) 25 MG tablet   No No   Sig: Take 0.5 tablets (12.5 mg) by mouth daily   vitamin E (VITAMIN E-400) 400 UNIT capsule  Self Yes No   Sig: Take 400 Units by mouth daily       Facility-Administered Medications: None     Allergies   Allergies   Allergen  Reactions     Demerol [Meperidine] Nausea and Vomiting       Physical Exam   Vital Signs: Temp: 98  F (36.7  C) Temp src: Oral BP: (!) 192/132 Pulse: 89 Heart Rate: 92 Resp: 20 SpO2: 98 % O2 Device: None (Room air)    Weight: 197 lbs 12.04 oz    General Appearance: fatigued but well appearing  Eyes: anicteric  HEENT: dry mucous membranes  Respiratory: bibasilar crackles, no distress, speaking in full sentences, no cough induced on exam, no wheezes present  Cardiovascular: RRR, no murmurs, S4 present, JVD to ear lobe, no pedal edema  GI: abd soft, nontender, NABS, organ exam limited by body habitus  Skin: Many benign appearing hyperpigmented skin lesions over face and back   Musculoskeletal: mild right ankle effusion without warmth, tenderness, or erythema, otherwise normal  Neurologic: CN2-12 grossly intact, full strength, normal tone  Psychiatric: appropriate mood and affect    Assessment & Plan   Jaja Ernandez is a 78 year old female with h/o HFrEF (recovered) who is admitted 6/29 with HTN emergency and decompensated heart failure with TATIANA and congestive hepatopathy due to medication non-compliance.     # HFrEF 20-30%, decompensated  # Severe mitral regurg  # NICM due to HTN  # Medication non-compliance  Suspect all related to med non-compliance and HTN. Family will bring ingredients of herbal tea but I have low suspicion this is contributing. Rec'd 40 mg IV lasix in ED. Global hypokinesis without WMA so suspicion of ACS low.   - Repeat 40 mg IV lasix now and BID, titrate in AM depending on response  - Resume home:   BP: amlodipine 10 mg qday   Afterload: imdur 60 mg qday   Prevention: ASA 81mg qday, atorva 20 mg qday  - Hold home:   Coreg 25 mg BID (due to severe HF, resume when able per day team)   Lisinopril 40 mg qday (TATIANA)   Spironolactone 12.5 mg qday (TATIANA)  - Tele, strics I/Os, daily weights    # HTN emergency  Blurry vision, pulm edema, trop elevation, TATIANA  - Treat as above.     # Non-onliguric TATIANA on  CKD  Suspect cardiorenal/HTN. No major lyte derangements.   - UA and FENA  - Replace K and Mag manually  - Renal consult in AM per primary team  - Resume home Ca-Vit D    # Congestive hepatopathy  Repeat hepatic panel in AM. Evaluate for other causes if not improving with diuresis.     # Normocytic anemia  Uncertain baseline. Chronic anemia of CKD +/- dilution effect. No signs of bleeding.   - CBC in AM.   - Resume ferrous sulfate     # Pain Assessment:  Current Pain Score 4/1/2015   Pain location -   Pain descriptors Headache   Jaja easton pain level was assessed and she currently denies pain.      Diet:  2g Na restriction, MVI  Fluids: None, diuresing  Lytes: not on protocol due to TATIANA  DVT Prophylaxis: Heparin SQ  Code Status: Full Code. Patients daughter would be surrogate decision maker in an emergency. Patient is leaning toward DNR but has not discussed this with family prior. Will discuss with family and wants to be full code until further notice.   Consults: PT/OT    Disposition Plan   Expected discharge: 2 - 3 days; recommended to prior living arrangement once renal function improved.     Entered: Sharon Keys 06/29/2018, 3:37 PM   Information in the above section will display in the discharge planner report.    The patient will be staffed with Cards 1 in AM.     Sharon Keys  Cards Sw[facility]  [facility]   Pager: [phone]  Please see sticky note for cross cover information      Data   K 3.1 Cr 3.34 (baseline 1.7 - 2.0)  Lytes otherwise nml  Hgb 9.8 MCV 91  Alb 3.3    AST//254  Normal alk phos and bili    INR 1.27  BNP 52K  Trop 0.114    CXR with pulmonary edema and small left side pleural effusion    ECHO today -   LVH with global hypokinesis and EF 20-30%  Mod to severe mitral regurg  Severe TV regurg  Severe pulm HTN       I interviewed and examined the patient with the house staff.  I agree with the assessment and plan as documented.      Mic Ochoa MD,  PhD  Professor of Medicine  Division of Cardiology

## 2018-06-29 NOTE — ED NOTES
[facility], [place]   ED Nurse to Floor Handoff     Jaja Ernandez is a 78 year old female who speaks English and lives with others,  in a home  They arrived in the ED by car from home    ED Chief Complaint: Cough    ED Dx;   Final diagnoses:   Acute pulmonary edema with congestive heart failure (H)         Needed?: No    Allergies:   Allergies   Allergen Reactions     Demerol [Meperidine] Nausea and Vomiting   .  Past Medical Hx:   Past Medical History:   Diagnosis Date     Cataract      Chronic renal failure, stage 3 (moderate)      Congestive heart failure, unspecified      Hypertension      Nonischemic cardiomyopathy (H) 8/25/2015     Other nonspecific abnormal cardiovascular system function study 5/15/2015     Systolic CHF (H)     LVEF 35-40% 3/2015      Baseline Mental status: WDL  Current Mental Status changes: at basesline    Infection present or suspected this encounter: no  Sepsis suspected: No  Isolation type: No active isolations     Activity level - Baseline/Home:  Independent  Activity Level - Current:   Unable to Assess    Bariatric equipment needed?: No    In the ED these meds were given:   Medications   lidocaine 1 % 1 mL (not administered)   lidocaine (LMX4) cream (not administered)   sodium chloride (PF) 0.9% PF flush 3 mL (not administered)   sodium chloride (PF) 0.9% PF flush 3 mL ( Intracatheter Canceled Entry 6/29/18 1242)   nitroGLYcerin (NITROSTAT) sublingual tablet 0.4 mg (0.4 mg Sublingual Given 6/29/18 1357)   ipratropium - albuterol 0.5 mg/2.5 mg/3 mL (DUONEB) neb solution 3 mL (3 mLs Nebulization Given 6/29/18 1238)   perflutren diluted 1mL to 2mL with saline (OPTISON) diluted injection 6 mL (6 mLs Intravenous Given 6/29/18 1315)   furosemide (LASIX) injection 40 mg (40 mg Intravenous Given 6/29/18 1358)   aspirin chewable tablet 324 mg (324 mg Oral Given 6/29/18 1439)       Drips running?  No    Home pump  No    Current LDAs  Peripheral IV  05/19/15 Right Upper forearm (Active)   Number of days:1137       Peripheral IV 06/29/18 Right Upper forearm (Active)   Site Assessment WDL 6/29/2018 12:41 PM   Line Status Saline locked 6/29/2018 12:41 PM   Phlebitis Scale 0-->no symptoms 6/29/2018 12:41 PM   Infiltration Scale 0 6/29/2018 12:41 PM   Number of days:0       Left Radial Interventional Cardiac Procedure Access (Active)   Number of days:1137       Labs results:   Labs Ordered and Resulted from Time of ED Arrival Up to the Time of Departure from the ED   CBC WITH PLATELETS DIFFERENTIAL - Abnormal; Notable for the following:        Result Value    RBC Count 3.40 (*)     Hemoglobin 9.8 (*)     Hematocrit 30.9 (*)     RDW 16.4 (*)     Nucleated RBCs 1 (*)     All other components within normal limits   INR - Abnormal; Notable for the following:     INR 1.27 (*)     All other components within normal limits   COMPREHENSIVE METABOLIC PANEL - Abnormal; Notable for the following:     Potassium 3.1 (*)     Glucose 125 (*)     Urea Nitrogen 32 (*)     Creatinine 3.34 (*)     GFR Estimate 13 (*)     GFR Estimate If Black 16 (*)     Albumin 3.3 (*)     Protein Total 6.7 (*)      (*)      (*)     All other components within normal limits   NT PROBNP INPATIENT - Abnormal; Notable for the following:     N-Terminal Pro BNP Inpatient 85251 (*)     All other components within normal limits   TROPONIN I - Abnormal; Notable for the following:     Troponin I ES 0.114 (*)     All other components within normal limits   PARTIAL THROMBOPLASTIN TIME   PULSE OXIMETRY NURSING   CARDIAC CONTINUOUS MONITORING   PERIPHERAL IV CATHETER       Imaging Studies:   Recent Results (from the past 24 hour(s))   XR Chest 2 Views    Narrative    Exam: XR CHEST 2 VW, 6/29/2018 1:36 PM    Indication: cough and sob;     Comparison: Radiograph of the chest 3/20/2015    Findings:   PA and lateral views of the chest. Cardiac silhouette is enlarged,  stable from the prior exam.  Pulmonary vasculature is distinct but with  perihilar opacities. Interstitial, bibasilar, and retrocardiac patchy  opacities. Small left pleural effusion. No pneumothorax. The upper  abdomen is unremarkable.      Impression    Impression:  Interstitial pulmonary edema with a small left pleural effusion.    I have personally reviewed the examination and initial interpretation  and I agree with the findings.    SESAR HOPKINS MD       Recent vital signs:   BP (!) 192/132  Pulse 89  Temp 98  F (36.7  C) (Oral)  Resp 20  Wt 89.7 kg (197 lb 12 oz)  SpO2 98%  BMI 38.62 kg/m2    Cardiac Rhythm: Normal Sinus  Pt needs tele? Yes  Skin/wound Issues: None    Code Status: Full Code    Pain control: pt had none    Nausea control: pt had none    Abnormal labs/tests/findings requiring intervention: BNP, Trop .114    Family present during ED course? Yes   Family Comments/Social Situation comments: pleasant    Tasks needing completion: None    Pt here with likely HF exacerbation. Hasn't taken meds for months. Placed on O2. Neb given for wheezing. Aspirin, nitro, lasix given. BP elevated.     Nely Moreno RN  [phone]

## 2018-06-30 ENCOUNTER — APPOINTMENT (OUTPATIENT)
Dept: OCCUPATIONAL THERAPY | Facility: CLINIC | Age: 78
DRG: 280 | End: 2018-06-30
Attending: INTERNAL MEDICINE
Payer: MEDICARE

## 2018-06-30 LAB
ALBUMIN SERPL-MCNC: 3.1 G/DL (ref 3.4–5)
ALP SERPL-CCNC: 141 U/L (ref 40–150)
ALT SERPL W P-5'-P-CCNC: 207 U/L (ref 0–50)
ANION GAP SERPL CALCULATED.3IONS-SCNC: 11 MMOL/L (ref 3–14)
AST SERPL W P-5'-P-CCNC: 168 U/L (ref 0–45)
BILIRUB SERPL-MCNC: 1.5 MG/DL (ref 0.2–1.3)
BUN SERPL-MCNC: 34 MG/DL (ref 7–30)
CALCIUM SERPL-MCNC: 9.2 MG/DL (ref 8.5–10.1)
CHLORIDE SERPL-SCNC: 109 MMOL/L (ref 94–109)
CO2 SERPL-SCNC: 26 MMOL/L (ref 20–32)
CREAT SERPL-MCNC: 3.44 MG/DL (ref 0.52–1.04)
CREAT UR-MCNC: 61 MG/DL
ERYTHROCYTE [DISTWIDTH] IN BLOOD BY AUTOMATED COUNT: 16.8 % (ref 10–15)
FRACT EXCRET NA UR+SERPL-RTO: 2.5 %
GFR SERPL CREATININE-BSD FRML MDRD: 13 ML/MIN/1.7M2
GLUCOSE SERPL-MCNC: 133 MG/DL (ref 70–99)
HCT VFR BLD AUTO: 29.7 % (ref 35–47)
HGB BLD-MCNC: 9.9 G/DL (ref 11.7–15.7)
MAGNESIUM SERPL-MCNC: 2.3 MG/DL (ref 1.6–2.3)
MCH RBC QN AUTO: 29.6 PG (ref 26.5–33)
MCHC RBC AUTO-ENTMCNC: 33.3 G/DL (ref 31.5–36.5)
MCV RBC AUTO: 89 FL (ref 78–100)
PLATELET # BLD AUTO: 270 10E9/L (ref 150–450)
POTASSIUM SERPL-SCNC: 3.2 MMOL/L (ref 3.4–5.3)
PROT SERPL-MCNC: 6.5 G/DL (ref 6.8–8.8)
RBC # BLD AUTO: 3.35 10E12/L (ref 3.8–5.2)
SODIUM SERPL-SCNC: 146 MMOL/L (ref 133–144)
SODIUM UR-SCNC: 63 MMOL/L
TROPONIN I SERPL-MCNC: 0.1 UG/L (ref 0–0.04)
TROPONIN I SERPL-MCNC: 0.12 UG/L (ref 0–0.04)
UUN UR-MCNC: 225 MG/DL
UUN/CREAT 24H UR: 4 G/G CR
WBC # BLD AUTO: 7.9 10E9/L (ref 4–11)

## 2018-06-30 PROCEDURE — 36415 COLL VENOUS BLD VENIPUNCTURE: CPT | Performed by: INTERNAL MEDICINE

## 2018-06-30 PROCEDURE — 97165 OT EVAL LOW COMPLEX 30 MIN: CPT | Mod: GO | Performed by: OCCUPATIONAL THERAPIST

## 2018-06-30 PROCEDURE — 25000132 ZZH RX MED GY IP 250 OP 250 PS 637: Mod: GY | Performed by: INTERNAL MEDICINE

## 2018-06-30 PROCEDURE — 85027 COMPLETE CBC AUTOMATED: CPT | Performed by: INTERNAL MEDICINE

## 2018-06-30 PROCEDURE — 99233 SBSQ HOSP IP/OBS HIGH 50: CPT | Mod: GC | Performed by: INTERNAL MEDICINE

## 2018-06-30 PROCEDURE — 97535 SELF CARE MNGMENT TRAINING: CPT | Mod: GO | Performed by: OCCUPATIONAL THERAPIST

## 2018-06-30 PROCEDURE — A9270 NON-COVERED ITEM OR SERVICE: HCPCS | Mod: GY | Performed by: INTERNAL MEDICINE

## 2018-06-30 PROCEDURE — 82248 BILIRUBIN DIRECT: CPT | Performed by: STUDENT IN AN ORGANIZED HEALTH CARE EDUCATION/TRAINING PROGRAM

## 2018-06-30 PROCEDURE — 97530 THERAPEUTIC ACTIVITIES: CPT | Mod: GO | Performed by: OCCUPATIONAL THERAPIST

## 2018-06-30 PROCEDURE — 83735 ASSAY OF MAGNESIUM: CPT | Performed by: INTERNAL MEDICINE

## 2018-06-30 PROCEDURE — 25000128 H RX IP 250 OP 636: Performed by: STUDENT IN AN ORGANIZED HEALTH CARE EDUCATION/TRAINING PROGRAM

## 2018-06-30 PROCEDURE — 40000133 ZZH STATISTIC OT WARD VISIT: Performed by: OCCUPATIONAL THERAPIST

## 2018-06-30 PROCEDURE — 25000128 H RX IP 250 OP 636: Performed by: INTERNAL MEDICINE

## 2018-06-30 PROCEDURE — A9270 NON-COVERED ITEM OR SERVICE: HCPCS | Mod: GY | Performed by: STUDENT IN AN ORGANIZED HEALTH CARE EDUCATION/TRAINING PROGRAM

## 2018-06-30 PROCEDURE — 40000556 ZZH STATISTIC PERIPHERAL IV START W US GUIDANCE

## 2018-06-30 PROCEDURE — 80053 COMPREHEN METABOLIC PANEL: CPT | Performed by: INTERNAL MEDICINE

## 2018-06-30 PROCEDURE — 25000132 ZZH RX MED GY IP 250 OP 250 PS 637: Mod: GY | Performed by: STUDENT IN AN ORGANIZED HEALTH CARE EDUCATION/TRAINING PROGRAM

## 2018-06-30 PROCEDURE — 21400006 ZZH R&B CCU INTERMEDIATE UMMC

## 2018-06-30 PROCEDURE — 84484 ASSAY OF TROPONIN QUANT: CPT | Performed by: INTERNAL MEDICINE

## 2018-06-30 RX ORDER — AMLODIPINE BESYLATE 10 MG/1
10 TABLET ORAL AT BEDTIME
Status: DISCONTINUED | OUTPATIENT
Start: 2018-07-01 | End: 2018-07-02 | Stop reason: HOSPADM

## 2018-06-30 RX ORDER — ISOSORBIDE MONONITRATE 60 MG/1
60 TABLET, EXTENDED RELEASE ORAL 2 TIMES DAILY
Status: DISCONTINUED | OUTPATIENT
Start: 2018-06-30 | End: 2018-07-02 | Stop reason: HOSPADM

## 2018-06-30 RX ORDER — HYDRALAZINE HYDROCHLORIDE 10 MG/1
10 TABLET, FILM COATED ORAL 3 TIMES DAILY
Status: DISCONTINUED | OUTPATIENT
Start: 2018-06-30 | End: 2018-06-30

## 2018-06-30 RX ORDER — POTASSIUM CHLORIDE 1.5 G/1.58G
40 POWDER, FOR SOLUTION ORAL ONCE
Status: COMPLETED | OUTPATIENT
Start: 2018-06-30 | End: 2018-06-30

## 2018-06-30 RX ORDER — HYDRALAZINE HYDROCHLORIDE 10 MG/1
10 TABLET, FILM COATED ORAL ONCE
Status: COMPLETED | OUTPATIENT
Start: 2018-06-30 | End: 2018-06-30

## 2018-06-30 RX ORDER — ACETAMINOPHEN 325 MG/1
650 TABLET ORAL EVERY 4 HOURS PRN
Status: DISCONTINUED | OUTPATIENT
Start: 2018-06-30 | End: 2018-07-02 | Stop reason: HOSPADM

## 2018-06-30 RX ORDER — HYDRALAZINE HYDROCHLORIDE 25 MG/1
25 TABLET, FILM COATED ORAL 3 TIMES DAILY
Status: DISCONTINUED | OUTPATIENT
Start: 2018-06-30 | End: 2018-07-01

## 2018-06-30 RX ORDER — HYDRALAZINE HYDROCHLORIDE 20 MG/ML
20 INJECTION INTRAMUSCULAR; INTRAVENOUS EVERY 6 HOURS PRN
Status: DISCONTINUED | OUTPATIENT
Start: 2018-06-30 | End: 2018-07-01

## 2018-06-30 RX ADMIN — ISOSORBIDE MONONITRATE 60 MG: 60 TABLET, EXTENDED RELEASE ORAL at 19:55

## 2018-06-30 RX ADMIN — ISOSORBIDE MONONITRATE 60 MG: 60 TABLET, EXTENDED RELEASE ORAL at 08:36

## 2018-06-30 RX ADMIN — FERROUS SULFATE TAB 325 MG (65 MG ELEMENTAL FE) 325 MG: 325 (65 FE) TAB at 08:36

## 2018-06-30 RX ADMIN — THERA TABS 1 TABLET: TAB at 08:37

## 2018-06-30 RX ADMIN — HYDRALAZINE HYDROCHLORIDE 20 MG: 20 INJECTION INTRAMUSCULAR; INTRAVENOUS at 00:31

## 2018-06-30 RX ADMIN — HEPARIN SODIUM 5000 UNITS: 5000 INJECTION, SOLUTION INTRAVENOUS; SUBCUTANEOUS at 04:16

## 2018-06-30 RX ADMIN — HYDRALAZINE HYDROCHLORIDE 25 MG: 25 TABLET ORAL at 15:21

## 2018-06-30 RX ADMIN — FUROSEMIDE 40 MG: 10 INJECTION, SOLUTION INTRAVENOUS at 06:54

## 2018-06-30 RX ADMIN — HYDRALAZINE HYDROCHLORIDE 25 MG: 25 TABLET ORAL at 19:55

## 2018-06-30 RX ADMIN — HEPARIN SODIUM 5000 UNITS: 5000 INJECTION, SOLUTION INTRAVENOUS; SUBCUTANEOUS at 17:03

## 2018-06-30 RX ADMIN — POTASSIUM CHLORIDE 40 MEQ: 1.5 POWDER, FOR SOLUTION ORAL at 10:07

## 2018-06-30 RX ADMIN — HYDRALAZINE HYDROCHLORIDE 10 MG: 10 TABLET, FILM COATED ORAL at 11:33

## 2018-06-30 RX ADMIN — ATORVASTATIN CALCIUM 20 MG: 20 TABLET, FILM COATED ORAL at 08:36

## 2018-06-30 RX ADMIN — AMLODIPINE BESYLATE 10 MG: 10 TABLET ORAL at 08:36

## 2018-06-30 RX ADMIN — OXYCODONE HYDROCHLORIDE AND ACETAMINOPHEN 500 MG: 500 TABLET ORAL at 08:36

## 2018-06-30 RX ADMIN — ACETAMINOPHEN 650 MG: 325 TABLET, FILM COATED ORAL at 15:22

## 2018-06-30 RX ADMIN — FUROSEMIDE 40 MG: 10 INJECTION, SOLUTION INTRAVENOUS at 17:03

## 2018-06-30 RX ADMIN — FERROUS SULFATE TAB 325 MG (65 MG ELEMENTAL FE) 325 MG: 325 (65 FE) TAB at 19:55

## 2018-06-30 RX ADMIN — ASPIRIN 81 MG: 81 TABLET, COATED ORAL at 08:36

## 2018-06-30 RX ADMIN — ACETAMINOPHEN 650 MG: 325 TABLET, FILM COATED ORAL at 08:37

## 2018-06-30 RX ADMIN — HYDRALAZINE HYDROCHLORIDE 10 MG: 10 TABLET, FILM COATED ORAL at 08:42

## 2018-06-30 RX ADMIN — Medication 1 TABLET: at 08:36

## 2018-06-30 ASSESSMENT — ACTIVITIES OF DAILY LIVING (ADL): PREVIOUS_RESPONSIBILITIES: MEAL PREP;HOUSEKEEPING;LAUNDRY;SHOPPING;MEDICATION MANAGEMENT;FINANCES

## 2018-06-30 ASSESSMENT — PAIN DESCRIPTION - DESCRIPTORS
DESCRIPTORS: HEADACHE
DESCRIPTORS: DULL

## 2018-06-30 NOTE — PLAN OF CARE
Problem: Patient Care Overview  Goal: Plan of Care/Patient Progress Review  Outcome: No Change  D/A/I:  Patient A&O x4, denied pain, palpitations, difficulty breathing, SOB, dizziness, and nausea.  Lung sounds diminished in bases, no abnormal heart sounds noted.  Had +1 edema in legs and feet, was given scheduled IV furosemide.  Patient had adequate urine output, see I&O flowsheet.  In sinus rhythm with occasional PJCs, HR 90s, SBP 170s, SaO2 92% on room air.  Ambulated in room with standby assist, was steady on her feet.  Daughter and grandson visited during shift.    P:  Continue to monitor pain, VS, heart rhythm, fluid status, bowel status, cardiac and respiratory status.  Notify care team of changes in patient condition or other concerns.  Continue gentle diuresis, manage significant hypertention.  Potential discharge in 2-3 days.

## 2018-06-30 NOTE — PLAN OF CARE
Problem: Patient Care Overview  Goal: Plan of Care/Patient Progress Review  Discharge Planner OT   Patient plan for discharge: Home  Current status: OT evaluation completed. Pt reports limited activity in recent weeks, although has remained ind with ADLs/IADLs. Pt completed bed mobility ind, LB dressing ind, ambulating within room/toilet tx with SBA with no AD. With progression towards longer household ambulation pt became SOB and quickly fatigued. Pt ambulating total of 80 feet and min A/Hand held assist with 1x/LOB requiring min A to correct. Hypertensive with ambulation, /96 (107),  bpm, Sats >93% on RA.  Barriers to return to prior living situation: medical readiness, activity tolerance, balance  Recommendations for discharge: Anticipate pt will progress to home with HH PT; however, would have concerns for home discharge if pt discharging home today- would recommend 24 hour assistance currently or TCU stay if assist not available.  Rationale for recommendations: Pt requiring min A with ambulation due to balance and poor activity tolerance, anticipate with AE training and continued therapy with acute stay pt will progress to allow for safe home d/c.        Entered by: Carolyn Frank 06/30/2018 2:45 PM

## 2018-06-30 NOTE — PROGRESS NOTES
Progress Note  Jaja Ernandez MRN: [id number]  Age: 78 year old, : 1940  Date: 2018          Interval History:     Reviewed H&P By Dr. Keys - in brief 77 y/o female wit Hx of HFrEF (EF 35%), HTN , CKD III , who presented with HFrEF excacerbation and HTN urgency.   Feeling better this AM - just didn't feel like taking meds anymore. 4 point ROS negative.      PHYSICAL EXAM    Vital signs:  Temp: 98  F (36.7  C) Temp src: Oral BP: (!) 154/91 Pulse: 89 Heart Rate: 96 Resp: 18 SpO2: 96 % O2 Device: None (Room air) Oxygen Delivery: 2 LPM Height: 152.4 cm (5') Weight: 84.1 kg (185 lb 8 oz)  Estimated body mass index is 36.23 kg/(m^2) as calculated from the following:    Height as of this encounter: 1.524 m (5').    Weight as of this encounter: 84.1 kg (185 lb 8 oz).      Intake/Output Summary (Last 24 hours) at 18 0740  Last data filed at 18 0600   Gross per 24 hour   Intake              410 ml   Output             3175 ml   Net            -2765 ml       GEN: NAD, resting comfortably on exam, pleasant and cooperative , AAox3  HEENT: NC/AT , well injected conjunctiva, anicteric sclera, EOMI, PERRL, MMM  Neck: trachea midline, JVD to jaw  CV: RRR, nl S1/S2 no mumurs  PULM: basilar rales   Abdomen: soft, non tender/distented , BS +   EXTREMITIES: warm, +2 pedal edema  SKIN: No rashes, sores or ulcerations  NEURO: MS: AAOx3 - no focal deficit noted    DIAGNOSTIC STUDIES  ROUTINE ICU LABS (Last four results)  CMP    Recent Labs  Lab 18  0540 18  1818 18  1232   * 143 143   POTASSIUM 3.2* 3.1* 3.1*   CHLORIDE 109 107 106   CO2 26 24 25   ANIONGAP 11 11 12   * 138* 125*   BUN 34* 34* 32*   CR 3.44* 3.53* 3.34*   GFRESTIMATED 13* 13* 13*   GFRESTBLACK 16* 15* 16*   CHICHO 9.2 8.8 8.8   MAG 2.3  --   --    PROTTOTAL 6.5*  --  6.7*   ALBUMIN 3.1*  --  3.3*   BILITOTAL 1.5*  --  1.0   ALKPHOS 141  --  145   *  --  241*   *  --  254*      CBC    Recent Labs  Lab 06/30/18  0540 06/29/18  1818 06/29/18  1232   WBC 7.9  --  6.9   RBC 3.35*  --  3.40*   HGB 9.9*  --  9.8*   HCT 29.7*  --  30.9*   MCV 89  --  91   MCH 29.6  --  28.8   MCHC 33.3  --  31.7   RDW 16.8*  --  16.4*    237 251     INR    Recent Labs  Lab 06/29/18  1232   INR 1.27*     Arterial Blood GasNo lab results found in last 7 days.    EKG : LVH - T wave inv 3-6    CXR: pulmonary CXR    Coronary Angiography 5/2015: diffuse athersclerosis - no focal disease     TTE 6/29:  Prominent left ventricular hypertrophy with global hypokinesis and severe left  ventricular systolic dysfunction (EF 20-30%).  Moderate to severe mitral valve regurgitation with central jet.  Severe tricuspid valve regurgitation with central jet.  Severe pulmonary hypertension with dilated IVC and right ventricular systolic  dysfunction.    Assessment and Plan:      Jaja Ernandez is a 78 year old female with h/o HFrEF (recovered) who is admitted 6/29 with HTN emergency and decompensated heart failure with TATIANA and congestive hepatopathy due to medication non-compliance.     #Plan for today:  - Up titrate Hydralazine / Imdur   - Continue to diurese     # HFrEF 20-30%, decompensated  # Severe mitral regurg  # NICM due to HTN  # Medication non-compliance  Suspect all related to med non-compliance and HTN. Family will bring ingredients of herbal tea but I have low suspicion this is contributing.   Will continue with lasix 40 mg bID.     - Diuresis 40 mg lasix BID   - Afterload reduction: Imdur 60 mg BID / Hydralazine 25 mg TID   - Prevention: ASA 81mg qday, atorva 20 mg qday  - Hold home:                          Coreg 25 mg BID (due to severe HF, resume when able per day team)                          Lisinopril 40 mg qday (TATIANA)                          Spironolactone 12.5 mg qday (TATIANA)  - Tele, strics I/Os, daily weights     #NSTEMI, likely Type II  Had troponin elevation , troponin peaked @0.124. Likely type II  in the setting of above. Coronary angiography negative in 5/2015.      # HTN urgency, resolved  - Treat as above.      # Non-onliguric TATIANA on CKD  Suspect cardiorenal/HTN. No major lyte derangements.   - UA and FENA  - K>4 , MG>2  - Resume home Ca-Vit D     # Congestive hepatopathy  Repeat hepatic panel in AM. Evaluate for other causes if not improving with diuresis.      # Normocytic anemia  Uncertain baseline. Chronic anemia of CKD +/- dilution effect. No signs of bleeding.   - CBC in AM.   - Resume ferrous sulfate     Prophylaxis:  DVT: Heparin BID  GI: None   Family: At bedside   Disposition: Home once stable (PT/OT consults)  Code Status: Full    Patient was seen and discussed with attending physician Dr. Ochoa, who agrees with above assessment and plan.    Ayesha Ferreira MD  Internal Medicine, PGY-2  Pager [phone]    I interviewed and examined the patient with the house staff.  I agree with the assessment and plan as documented.    Mic Ochoa MD, PhD  Professor of Medicine  Division of Cardiology

## 2018-06-30 NOTE — PROGRESS NOTES
D: Patient arrived to 6C from ED for the management of HF and HTN emergency.  Hx of noncomplaince medication regiment.  I/A: Patient BP goal less than 180/120.  K=3.1, 40mEq oral given, recheck AM.    Renal consult in the AM for CKD/TATIANA.  20mg IV Hydralazine given for /123 and  was notified.  Stand by assist to the bedside commode and Voided 3L post lasix adminstration.  P:  Will continue with cares and monitor I&O and K-level and the providers will be notified of BP greater than 180/120.

## 2018-06-30 NOTE — PROGRESS NOTES
06/30/18 1455   Quick Adds   Type of Visit Initial Occupational Therapy Evaluation   Living Environment   Lives With child(jai), adult   Living Arrangements apartment  (senior apartment building)   Home Accessibility no concerns;tub/shower is not walk in;grab bars present (toilet);grab bars present (bathtub)   Number of Stairs to Enter Home 0   Number of Stairs Within Home 0  (first floor apartment)   Transportation Available family or friend will provide   Living Environment Comment Pt lives with daughter who has history of CVA and ambulates with a walker. Pt reports in past month has been 'homebound' and not even walking within apartment building to get mail etc.    Self-Care   Usual Activity Tolerance fair   Current Activity Tolerance poor   Regular Exercise no   Equipment Currently Used at Home grab bar;raised toilet  (has shower chair but does not use)   Activity/Exercise/Self-Care Comment In recent weeks has been completing very little activity, pt reports 'laziness' affection participation in daily activity   Functional Level Prior   Ambulation 0-->independent   Transferring 0-->independent   Toileting 0-->independent   Bathing 0-->independent   Dressing 0-->independent   Eating 0-->independent   Communication 0-->understands/communicates without difficulty   Swallowing 0-->swallows foods/liquids without difficulty   Cognition 0 - no cognition issues reported   Fall history within last six months no   Which of the above functional risks had a recent onset or change? ambulation   Prior Functional Level Comment IND with all ADLs/IADLs with exception of driving   General Information   Onset of Illness/Injury or Date of Surgery - Date 06/29/18   Referring Physician Sharon Keys MD   Patient/Family Goals Statement To discharge home   Additional Occupational Profile Info/Pertinent History of Current Problem Jaja Ernandez is a 78 year old female with h/o HFrEF (due to HTN, Dx 3/2015 EF 35%, recovered  to 49% in 6/2015), CKD stage III who presents to ED with family for 3+ weeks of fatigue, decreased exercise tolerance, SOB and cough.    Precautions/Limitations fall precautions   Weight-Bearing Status - LUE full weight-bearing   Weight-Bearing Status - RUE full weight-bearing   Weight-Bearing Status - LLE full weight-bearing   Weight-Bearing Status - RLE full weight-bearing   Heart Disease Risk Factors High blood pressure;Lack of physical activity;Overweight   General Info Comments Activity: Up with assist   Cognitive Status Examination   Orientation orientation to person, place and time   Level of Consciousness alert   Able to Follow Commands WNL/WFL   Cognitive Comment Recently not taking medications at home   Visual Perception   Visual Perception Comments Reports no changes   Range of Motion (ROM)   ROM Comment BUE WNL   Strength   Strength Comments Grossly deconditioned   Mobility   Bed Mobility Bed mobility skill: Supine to sit   Bed Mobility Skill: Supine to Sit   Level of [place]: Supine/Sit independent   Transfer Skill: Sit to Stand   Level of [place]: Sit/Stand stand-by assist   Transfer Skill: Toilet Transfer   Level of [place]: Toilet stand-by assist   Toileting   Level of [place]: Toilet independent   Grooming   Level of [place]: Grooming independent  (seated)   Eating/Self Feeding   Level of [place]: Eating independent   Instrumental Activities of Daily Living (IADL)   Previous Responsibilities meal prep;housekeeping;laundry;shopping;medication management;finances   IADL Comments dtr provides transportation   Activities of Daily Living Analysis   Impairments Contributing to Impaired Activities of Daily Living balance impaired;strength decreased  (decreased activity tolerance)   General Therapy Interventions   Planned Therapy Interventions ADL retraining;IADL retraining;balance training;strengthening;transfer training;home program guidelines;progressive  "activity/exercise;risk factor education   Clinical Impression   Criteria for Skilled Therapeutic Interventions Met yes, treatment indicated   OT Diagnosis Decreased IND ADLs   Influenced by the following impairments activity tolerance, fatigue, medical status   Assessment of Occupational Performance 3-5 Performance Deficits   Identified Performance Deficits shower, home mgmt, meal prep, shopping   Clinical Decision Making (Complexity) Low complexity   Therapy Frequency other (see comments)  (6x/week)   Predicted Duration of Therapy Intervention (days/wks) 1 week   Anticipated Equipment Needs at Discharge (TBD during OT sessions)   Anticipated Discharge Disposition Home with Home Therapy;Home with Assist   Risks and Benefits of Treatment have been explained. Yes   Patient, Family & other staff in agreement with plan of care Yes   Clinical Impression Comments Pt requiring increased level of assist with household ability from baseline and limited activity tolerance impacting participation in ADLs. Pt will benefit from skilled OT for progression of activity tolerance for improved ADL performance   [facility] TM \"6 Clicks\"   2016, Trustees of [facility], under license to [employer].  All rights reserved.   6 Clicks Short Forms Daily Activity Inpatient Short Form   [facility]  \"6 Clicks\" Daily Activity Inpatient Short Form   1. Putting on and taking off regular lower body clothing? 4 - None   2. Bathing (including washing, rinsing, drying)? 3 - A Little   3. Toileting, which includes using toilet, bedpan or urinal? 4 - None   4. Putting on and taking off regular upper body clothing? 4 - None   5. Taking care of personal grooming such as brushing teeth? 4 - None   6. Eating meals? 4 - None   Daily Activity Raw Score (Score out of 24.Lower scores equate to lower levels of function) 23   Total Evaluation Time   Total Evaluation Time (Minutes) 10     "

## 2018-06-30 NOTE — PLAN OF CARE
Problem: Patient Care Overview  Goal: Plan of Care/Patient Progress Review  Outcome: No Change  D/A/I:  Patient A&O x4, denied palpitations, difficulty breathing, SOB, dizziness, and nausea.  Lung sounds diminished in left lung base, no abnormal heart sounds noted.  Had +1 edema in legs and feet, was given IV furosemide just prior to shift.  Patient had good urine output, see I&O flowsheet.  In sinus rhythm with HR 80s, SBP 170s, SaO2 90-94% on room air.  Scheduled hydralazine started during shift, SBP decreased to 140s.  Potassium replaced per one-time order.  Reported a headache that resolved with acetaminophen.  Ambulated in room with standby assist, was steady on her feet.  Family visited during shift.    P:  Continue to monitor pain, VS, heart rhythm, fluid status, bowel status, cardiac and respiratory status.  Notify care team of changes in patient condition or other concerns.  Potential discharge 7/2 or 7/3

## 2018-06-30 NOTE — PHARMACY-ADMISSION MEDICATION HISTORY
Admission medication history interview status for the 6/29/2018 admission is complete. See Epic admission navigator for allergy information, pharmacy, prior to admission medications and immunization status.     Medication history interview sources:  Self + Surescripts    Changes made to PTA medication list (reason)  Added: noone  Deleted: none  Changed: none    Additional medication history information (including reliability of information, actions taken by pharmacist): Patient was a reliable medication historian but has been completely non compliant for the past three months with any prescription medication. Patient reports that she has not taken any of the following in three months but has been taking only supplements instead. Prescription medications were left on the MAR as the patient reported that she would be willing to start taking medications again once she left the hospital      Prior to Admission medications    Medication Sig Last Dose Taking? Auth Provider   Ascorbic Acid (VITAMIN C PO) Take 500 mg by mouth daily  6/28/2018 at 0800 Yes Reported, Patient   aspirin 81 MG tablet Take 81 mg by mouth daily  6/28/2018 at 0800 Yes Reported, Patient   Aspirin-Salicylamide-Caffeine (BC HEADACHE POWDER PO) Take 1 powder on the tongue every 6 hours with water as needed for headache 6/29/2018 at 0400 Yes Unknown, Entered By History   calcium-vitamin D (CALTRATE) 600-400 MG-UNIT per tablet Take 1 tablet by mouth daily 6/28/2018 at 0800 Yes Unknown, Entered By History   ferrous sulfate (IRON) 325 (65 FE) MG tablet Take 1 tablet (325 mg) by mouth 2 times daily 6/28/2018 at 0800 Yes Dave Correa MD   garlic 150 MG TABS Take 150 mg by mouth daily 6/28/2018 at 0800 Yes Unknown, Entered By History   Multiple Vitamins-Minerals (CENTRUM SILVER) per tablet Take 1 tablet by mouth daily 6/28/2018 at 0800 Yes Unknown, Entered By History   vitamin E (VITAMIN E-400) 400 UNIT capsule Take 400 Units by mouth daily   6/28/2018 at 0800 Yes Reported, Patient   amLODIPine (NORVASC) 10 MG tablet Take 1 tablet (10 mg) by mouth daily More than a month at Unknown time  Candy Patterson MD   atorvastatin (LIPITOR) 20 MG tablet TAKE ONE TABLET BY MOUTH ONCE DAILY More than a month at Unknown time  Candy Patterson MD   carvedilol (COREG) 25 MG tablet TAKE ONE TABLET (25MG) BY MOUTH TWICE DAILY WITH MEALS More than a month at Unknown time  Candy Patterson MD   docusate sodium (COLACE) 100 MG capsule Take 1 capsule (100 mg) by mouth 2 times daily as needed for constipation More than a month at Unknown time  Candy Patterson MD   furosemide (LASIX) 20 MG tablet TAKE ONE TABLET BY MOUTH EVERY OTHER DAY More than a month at Unknown time  Candy Patterson MD   isosorbide mononitrate (IMDUR) 60 MG 24 hr tablet Take 1 tablet (60 mg) by mouth daily More than a month at Unknown time  Candy Patterson MD   lisinopril (PRINIVIL/ZESTRIL) 40 MG tablet TAKE ONE TABLET BY MOUTH ONCE DAILY More than a month at Unknown time  Candy Patterson MD   spironolactone (ALDACTONE) 25 MG tablet Take 0.5 tablets (12.5 mg) by mouth daily More than a month at Unknown time  Rupa Trinh NP         Medication history completed by: Wil Harris on 6/29/2018 at 7:34 PM

## 2018-07-01 ENCOUNTER — APPOINTMENT (OUTPATIENT)
Dept: PHYSICAL THERAPY | Facility: CLINIC | Age: 78
DRG: 280 | End: 2018-07-01
Attending: INTERNAL MEDICINE
Payer: MEDICARE

## 2018-07-01 ENCOUNTER — APPOINTMENT (OUTPATIENT)
Dept: OCCUPATIONAL THERAPY | Facility: CLINIC | Age: 78
DRG: 280 | End: 2018-07-01
Payer: MEDICARE

## 2018-07-01 LAB
ALBUMIN SERPL-MCNC: 3 G/DL (ref 3.4–5)
ALBUMIN UR-MCNC: 100 MG/DL
ALP SERPL-CCNC: 128 U/L (ref 40–150)
ALT SERPL W P-5'-P-CCNC: 160 U/L (ref 0–50)
AMORPH CRY #/AREA URNS HPF: ABNORMAL /HPF
ANION GAP SERPL CALCULATED.3IONS-SCNC: 11 MMOL/L (ref 3–14)
APPEARANCE UR: CLEAR
AST SERPL W P-5'-P-CCNC: 103 U/L (ref 0–45)
BASOPHILS # BLD AUTO: 0.1 10E9/L (ref 0–0.2)
BASOPHILS NFR BLD AUTO: 0.6 %
BILIRUB SERPL-MCNC: 0.7 MG/DL (ref 0.2–1.3)
BILIRUB UR QL STRIP: NEGATIVE
BUN SERPL-MCNC: 31 MG/DL (ref 7–30)
CALCIUM SERPL-MCNC: 8.8 MG/DL (ref 8.5–10.1)
CHLORIDE SERPL-SCNC: 106 MMOL/L (ref 94–109)
CO2 SERPL-SCNC: 27 MMOL/L (ref 20–32)
COLOR UR AUTO: YELLOW
CREAT SERPL-MCNC: 3.46 MG/DL (ref 0.52–1.04)
DIFFERENTIAL METHOD BLD: ABNORMAL
EOSINOPHIL # BLD AUTO: 0.2 10E9/L (ref 0–0.7)
EOSINOPHIL NFR BLD AUTO: 2.3 %
ERYTHROCYTE [DISTWIDTH] IN BLOOD BY AUTOMATED COUNT: 17.2 % (ref 10–15)
ERYTHROCYTE [DISTWIDTH] IN BLOOD BY AUTOMATED COUNT: 17.3 % (ref 10–15)
GFR SERPL CREATININE-BSD FRML MDRD: 13 ML/MIN/1.7M2
GLUCOSE SERPL-MCNC: 128 MG/DL (ref 70–99)
GLUCOSE UR STRIP-MCNC: NEGATIVE MG/DL
HCT VFR BLD AUTO: 29.3 % (ref 35–47)
HCT VFR BLD AUTO: 29.7 % (ref 35–47)
HGB BLD-MCNC: 9.3 G/DL (ref 11.7–15.7)
HGB BLD-MCNC: 9.4 G/DL (ref 11.7–15.7)
HGB UR QL STRIP: NEGATIVE
IMM GRANULOCYTES # BLD: 0 10E9/L (ref 0–0.4)
IMM GRANULOCYTES NFR BLD: 0.3 %
KETONES UR STRIP-MCNC: NEGATIVE MG/DL
LDH SERPL L TO P-CCNC: 339 U/L (ref 81–234)
LEUKOCYTE ESTERASE UR QL STRIP: ABNORMAL
LYMPHOCYTES # BLD AUTO: 2 10E9/L (ref 0.8–5.3)
LYMPHOCYTES NFR BLD AUTO: 25.1 %
MAGNESIUM SERPL-MCNC: 2.3 MG/DL (ref 1.6–2.3)
MCH RBC QN AUTO: 28.8 PG (ref 26.5–33)
MCH RBC QN AUTO: 28.9 PG (ref 26.5–33)
MCHC RBC AUTO-ENTMCNC: 31.6 G/DL (ref 31.5–36.5)
MCHC RBC AUTO-ENTMCNC: 31.7 G/DL (ref 31.5–36.5)
MCV RBC AUTO: 91 FL (ref 78–100)
MCV RBC AUTO: 91 FL (ref 78–100)
MONOCYTES # BLD AUTO: 0.7 10E9/L (ref 0–1.3)
MONOCYTES NFR BLD AUTO: 8.9 %
MUCOUS THREADS #/AREA URNS LPF: PRESENT /LPF
NEUTROPHILS # BLD AUTO: 5 10E9/L (ref 1.6–8.3)
NEUTROPHILS NFR BLD AUTO: 62.8 %
NITRATE UR QL: NEGATIVE
NRBC # BLD AUTO: 0 10*3/UL
NRBC BLD AUTO-RTO: 0 /100
PH UR STRIP: 5.5 PH (ref 5–7)
PHOSPHATE SERPL-MCNC: 2.9 MG/DL (ref 2.5–4.5)
PLATELET # BLD AUTO: 268 10E9/L (ref 150–450)
PLATELET # BLD AUTO: 278 10E9/L (ref 150–450)
POTASSIUM SERPL-SCNC: 3.3 MMOL/L (ref 3.4–5.3)
POTASSIUM UR-SCNC: 24 MMOL/L
PROT SERPL-MCNC: 6.3 G/DL (ref 6.8–8.8)
PTH-INTACT SERPL-MCNC: 236 PG/ML (ref 18–80)
RBC # BLD AUTO: 3.22 10E12/L (ref 3.8–5.2)
RBC # BLD AUTO: 3.26 10E12/L (ref 3.8–5.2)
RBC #/AREA URNS AUTO: 1 /HPF (ref 0–2)
RETICS # AUTO: 158.7 10E9/L (ref 25–95)
RETICS/RBC NFR AUTO: 4.9 % (ref 0.5–2)
SODIUM SERPL-SCNC: 144 MMOL/L (ref 133–144)
SODIUM UR-SCNC: 77 MMOL/L
SOURCE: ABNORMAL
SP GR UR STRIP: 1.01 (ref 1–1.03)
SQUAMOUS #/AREA URNS AUTO: 2 /HPF (ref 0–1)
UROBILINOGEN UR STRIP-MCNC: NORMAL MG/DL (ref 0–2)
WBC # BLD AUTO: 8 10E9/L (ref 4–11)
WBC # BLD AUTO: 8.2 10E9/L (ref 4–11)
WBC #/AREA URNS AUTO: 1 /HPF (ref 0–5)

## 2018-07-01 PROCEDURE — 85025 COMPLETE CBC W/AUTO DIFF WBC: CPT | Performed by: INTERNAL MEDICINE

## 2018-07-01 PROCEDURE — 25000132 ZZH RX MED GY IP 250 OP 250 PS 637: Mod: GY | Performed by: STUDENT IN AN ORGANIZED HEALTH CARE EDUCATION/TRAINING PROGRAM

## 2018-07-01 PROCEDURE — 85027 COMPLETE CBC AUTOMATED: CPT | Performed by: STUDENT IN AN ORGANIZED HEALTH CARE EDUCATION/TRAINING PROGRAM

## 2018-07-01 PROCEDURE — 84100 ASSAY OF PHOSPHORUS: CPT | Performed by: INTERNAL MEDICINE

## 2018-07-01 PROCEDURE — 84300 ASSAY OF URINE SODIUM: CPT | Performed by: INTERNAL MEDICINE

## 2018-07-01 PROCEDURE — 21400006 ZZH R&B CCU INTERMEDIATE UMMC

## 2018-07-01 PROCEDURE — 85045 AUTOMATED RETICULOCYTE COUNT: CPT | Performed by: INTERNAL MEDICINE

## 2018-07-01 PROCEDURE — A9270 NON-COVERED ITEM OR SERVICE: HCPCS | Mod: GY | Performed by: STUDENT IN AN ORGANIZED HEALTH CARE EDUCATION/TRAINING PROGRAM

## 2018-07-01 PROCEDURE — 80053 COMPREHEN METABOLIC PANEL: CPT | Performed by: STUDENT IN AN ORGANIZED HEALTH CARE EDUCATION/TRAINING PROGRAM

## 2018-07-01 PROCEDURE — 97116 GAIT TRAINING THERAPY: CPT | Mod: GP

## 2018-07-01 PROCEDURE — 25000128 H RX IP 250 OP 636: Performed by: INTERNAL MEDICINE

## 2018-07-01 PROCEDURE — 84133 ASSAY OF URINE POTASSIUM: CPT | Performed by: INTERNAL MEDICINE

## 2018-07-01 PROCEDURE — 84244 ASSAY OF RENIN: CPT | Performed by: INTERNAL MEDICINE

## 2018-07-01 PROCEDURE — 40000611 ZZHCL STATISTIC MORPHOLOGY W/INTERP HEMEPATH TC 85060: Performed by: INTERNAL MEDICINE

## 2018-07-01 PROCEDURE — 36415 COLL VENOUS BLD VENIPUNCTURE: CPT | Performed by: STUDENT IN AN ORGANIZED HEALTH CARE EDUCATION/TRAINING PROGRAM

## 2018-07-01 PROCEDURE — 25000132 ZZH RX MED GY IP 250 OP 250 PS 637: Mod: GY | Performed by: INTERNAL MEDICINE

## 2018-07-01 PROCEDURE — 40000193 ZZH STATISTIC PT WARD VISIT

## 2018-07-01 PROCEDURE — 97161 PT EVAL LOW COMPLEX 20 MIN: CPT | Mod: GP

## 2018-07-01 PROCEDURE — 97530 THERAPEUTIC ACTIVITIES: CPT | Mod: GP

## 2018-07-01 PROCEDURE — 82088 ASSAY OF ALDOSTERONE: CPT | Performed by: INTERNAL MEDICINE

## 2018-07-01 PROCEDURE — 83615 LACTATE (LD) (LDH) ENZYME: CPT | Performed by: INTERNAL MEDICINE

## 2018-07-01 PROCEDURE — 40000133 ZZH STATISTIC OT WARD VISIT: Performed by: OCCUPATIONAL THERAPIST

## 2018-07-01 PROCEDURE — 97530 THERAPEUTIC ACTIVITIES: CPT | Mod: GO | Performed by: OCCUPATIONAL THERAPIST

## 2018-07-01 PROCEDURE — 83970 ASSAY OF PARATHORMONE: CPT | Performed by: INTERNAL MEDICINE

## 2018-07-01 PROCEDURE — 81001 URINALYSIS AUTO W/SCOPE: CPT | Performed by: INTERNAL MEDICINE

## 2018-07-01 PROCEDURE — A9270 NON-COVERED ITEM OR SERVICE: HCPCS | Mod: GY | Performed by: INTERNAL MEDICINE

## 2018-07-01 PROCEDURE — 83735 ASSAY OF MAGNESIUM: CPT | Performed by: STUDENT IN AN ORGANIZED HEALTH CARE EDUCATION/TRAINING PROGRAM

## 2018-07-01 PROCEDURE — 99232 SBSQ HOSP IP/OBS MODERATE 35: CPT | Mod: GC | Performed by: INTERNAL MEDICINE

## 2018-07-01 PROCEDURE — 36415 COLL VENOUS BLD VENIPUNCTURE: CPT | Performed by: INTERNAL MEDICINE

## 2018-07-01 RX ORDER — HYDRALAZINE HYDROCHLORIDE 50 MG/1
50 TABLET, FILM COATED ORAL 3 TIMES DAILY
Status: DISCONTINUED | OUTPATIENT
Start: 2018-07-01 | End: 2018-07-02 | Stop reason: HOSPADM

## 2018-07-01 RX ORDER — POTASSIUM CHLORIDE 1.5 G/1.58G
40 POWDER, FOR SOLUTION ORAL ONCE
Status: COMPLETED | OUTPATIENT
Start: 2018-07-01 | End: 2018-07-01

## 2018-07-01 RX ORDER — FUROSEMIDE 40 MG
40 TABLET ORAL
Status: DISCONTINUED | OUTPATIENT
Start: 2018-07-01 | End: 2018-07-02 | Stop reason: HOSPADM

## 2018-07-01 RX ORDER — HYDRALAZINE HYDROCHLORIDE 20 MG/ML
20 INJECTION INTRAMUSCULAR; INTRAVENOUS EVERY 6 HOURS PRN
Status: DISCONTINUED | OUTPATIENT
Start: 2018-07-01 | End: 2018-07-02 | Stop reason: HOSPADM

## 2018-07-01 RX ADMIN — ISOSORBIDE MONONITRATE 60 MG: 60 TABLET, EXTENDED RELEASE ORAL at 08:10

## 2018-07-01 RX ADMIN — THERA TABS 1 TABLET: TAB at 08:10

## 2018-07-01 RX ADMIN — AMLODIPINE BESYLATE 10 MG: 10 TABLET ORAL at 21:41

## 2018-07-01 RX ADMIN — POTASSIUM CHLORIDE 40 MEQ: 1.5 POWDER, FOR SOLUTION ORAL at 08:09

## 2018-07-01 RX ADMIN — DOCUSATE SODIUM 100 MG: 100 CAPSULE, LIQUID FILLED ORAL at 20:10

## 2018-07-01 RX ADMIN — ACETAMINOPHEN 650 MG: 325 TABLET, FILM COATED ORAL at 21:15

## 2018-07-01 RX ADMIN — OXYCODONE HYDROCHLORIDE AND ACETAMINOPHEN 500 MG: 500 TABLET ORAL at 08:10

## 2018-07-01 RX ADMIN — ACETAMINOPHEN 650 MG: 325 TABLET, FILM COATED ORAL at 05:38

## 2018-07-01 RX ADMIN — FUROSEMIDE 40 MG: 40 TABLET ORAL at 17:10

## 2018-07-01 RX ADMIN — HYDRALAZINE HYDROCHLORIDE 20 MG: 20 INJECTION INTRAMUSCULAR; INTRAVENOUS at 06:06

## 2018-07-01 RX ADMIN — HEPARIN SODIUM 5000 UNITS: 5000 INJECTION, SOLUTION INTRAVENOUS; SUBCUTANEOUS at 05:25

## 2018-07-01 RX ADMIN — FERROUS SULFATE TAB 325 MG (65 MG ELEMENTAL FE) 325 MG: 325 (65 FE) TAB at 08:10

## 2018-07-01 RX ADMIN — ISOSORBIDE MONONITRATE 60 MG: 60 TABLET, EXTENDED RELEASE ORAL at 20:10

## 2018-07-01 RX ADMIN — DOCUSATE SODIUM 100 MG: 100 CAPSULE, LIQUID FILLED ORAL at 09:23

## 2018-07-01 RX ADMIN — Medication 1 TABLET: at 08:10

## 2018-07-01 RX ADMIN — HEPARIN SODIUM 5000 UNITS: 5000 INJECTION, SOLUTION INTRAVENOUS; SUBCUTANEOUS at 17:10

## 2018-07-01 RX ADMIN — ASPIRIN 81 MG: 81 TABLET, COATED ORAL at 08:10

## 2018-07-01 RX ADMIN — HYDRALAZINE HYDROCHLORIDE 50 MG: 50 TABLET ORAL at 13:49

## 2018-07-01 RX ADMIN — HYDRALAZINE HYDROCHLORIDE 20 MG: 20 INJECTION INTRAMUSCULAR; INTRAVENOUS at 23:52

## 2018-07-01 RX ADMIN — ATORVASTATIN CALCIUM 20 MG: 20 TABLET, FILM COATED ORAL at 08:10

## 2018-07-01 RX ADMIN — FUROSEMIDE 40 MG: 10 INJECTION, SOLUTION INTRAVENOUS at 05:26

## 2018-07-01 RX ADMIN — HYDRALAZINE HYDROCHLORIDE 25 MG: 25 TABLET ORAL at 08:10

## 2018-07-01 RX ADMIN — ACETAMINOPHEN 650 MG: 325 TABLET, FILM COATED ORAL at 13:49

## 2018-07-01 RX ADMIN — FERROUS SULFATE TAB 325 MG (65 MG ELEMENTAL FE) 325 MG: 325 (65 FE) TAB at 20:09

## 2018-07-01 RX ADMIN — HYDRALAZINE HYDROCHLORIDE 50 MG: 50 TABLET ORAL at 20:10

## 2018-07-01 ASSESSMENT — PAIN DESCRIPTION - DESCRIPTORS
DESCRIPTORS: DULL
DESCRIPTORS: HEADACHE
DESCRIPTORS: HEADACHE

## 2018-07-01 NOTE — PROGRESS NOTES
Cardiology Progress Note  Jaja Ernandez MRN: [id number]  Age: 78 year old, : 1940  Date: 2018       Changes today     - KCl 40 mEQ x 1 dose   - Increase hydralazine to 50 mg tid  - Nephrology consult for TATIANA/CKD  - Change furosemide 40 mg IV bid to 40 mg PO bid  - Workup per nephrology: PTH, phosphorous, PBS, aldosterone, renin, iron panel and kidney ultrasound            Assessments and Plans     Jaja Ernandez is a 78-year-old female with medical history significant for HFrEF and HTN who is admitted on  with HTN emergency and decompensated heart failure with TATIANA due to medication non-compliance.     # Acute decompensated heart failure   # HFrEF (NICM)  # Moderate to severe MR  # Severe TR   # Severe PHT   Suspect all related to med non-compliance and HTN. TTE () showed LVEF 20-30% with moderate to severe mitral valve regurgitation, severe tricuspid valve regurgitation. Severe pulmonary hypertension   - Telemetry, strics I/Os, daily weights  - Diuresis with 40 mg furosemide IV bid (-) then switch to furosemide 40 mg PO bid     - Afterload reduction: Imdur 60 mg bid/ Hydralazine 50 mg tid   - Prevention: ASA 81mg qday, atorva 20 mg qday  - Hold home carvedilol 25 mg BID (due to severe HF), lisinopril 40 mg qday (TATIANA) and spironolactone 12.5 mg qday (TATIANA)    # Elevated troponin   Troponin peaked at 0.124. Likely type II in the setting of above. Coronary angiography negative in 2015.     # HTN urgency, resolved  - Treat as above.       # Non-onliguric TATIANA on CKD  Suspect cardiorenal/HTN or change in CKD baseline   - Nephrology consult for TATIANA/CKD  - Workup per nephrology: PTH, phosphorous, PBS, aldosterone, renin and kidney ultrasound         # Normocytic anemia  Uncertain baseline. Chronic anemia of CKD +/- dilution effect. No signs of bleeding.   - Trending CBC   - Iron panel in AM   - Resume ferrous sulfate      FEN: 2 g sodium restriction   DVT  "Prophylaxis: Heparin BID  GI Prophylaxis: Low risk   Disposition: Pending treatment, likely home within 2-3 days    Code Status: Full code      Patient discussed with staff attending, Dr. Ochoa and the note reflects our joint plan.     Supavit \"Mac\" MD Yanely   PGY-2, Internal Medicine  Cardiology Service  Pager: [phone]      Subjective     No acute event overnight, nursing note was reviewed. She feels much better and can walk around without difficulty. She denies chest pain, shortness of breath, orthopnea or PND.           Objective     B/P: 155/88, T: 98.5, P: 89, R: 18  Intake/Output Summary (Last 24 hours) at 07/01/18 1549  Last data filed at 07/01/18 1500   Gross per 24 hour   Intake             1620 ml   Output             2075 ml   Net             -455 ml     Vitals:    06/29/18 1710 06/30/18 0600 07/01/18 0223   Weight: 86.1 kg (189 lb 14.4 oz) 84.1 kg (185 lb 8 oz) 84.6 kg (186 lb 6.4 oz)     GEN: NAD, resting comfortably on exam, pleasant and cooperative , AAox3  HEENT: NC/AT , well injected conjunctiva, anicteric sclera, EOMI, PERRL, MMM  Neck: trachea midline, JVP to jaw  CV: RRR, nl S1/S2 no mumurs  PULM: basilar crackles    Abdomen: soft, non tender/distented , BS +   EXTREMITIES: warm, +2 pedal edema  SKIN: No rashes, sores or ulcerations  NEURO: MS: AAOx3 - no focal deficit noted    Lab and imaging were reviewed in [facility].     I interviewed and examined the patient with the house staff.  I agree with the assessment and plan as documented.      Mic Ochoa MD, PhD  Professor of Medicine  Division of Cardiology  "

## 2018-07-01 NOTE — PLAN OF CARE
D/A/I:  Patient A&O x4, denied palpitations, difficulty breathing, SOB, dizziness, and nausea.  Lung sounds diminished in bases, no abnormal heart sounds noted.  Had +1 edema in ankles.  Had good urine output, see I&O flowsheet.  Reported no BM since 6/29, was given prn stool softener.  No BM during shift.  In sinus rhythm with occasional PJCs, HR 90s, SBP 140s-150s, SaO2 95-99% on room air.  Potassium replaced per one-time order.  Reported a headache that was decreased with acetaminophen and cold packs.  Ambulated in hallway with standby assist.  Family visited during shift.    P:  Continue to monitor pain, VS, heart rhythm, fluid status, bowel status, cardiac and respiratory status.  Notify care team of changes in patient condition or other concerns.  Potential discharge 7/2 or 7/3.

## 2018-07-01 NOTE — PLAN OF CARE
Problem: Patient Care Overview  Goal: Plan of Care/Patient Progress Review  Discharge Planner PT  - 6C - PT evaluation completed, treatment initiated.  Patient plan for discharge: Home with assist.  Current status: Completes supine <> sit independently. Transfers sit <> stand with Mod I. Ambulates ~200ft 2x, first bout with FWW + SBA, second bout with HHA from PT and CGA. Seated breaks taken upon completion of each bout 2/2 fatigue and mild SOB. Pt endorses she owns both FWW and cane, does not use at baseline though agrees she is more steady on feet with use of AD. Ascends/descends 3 steps with single HR + SBA. During session SpO2 >95% on RA and HR ranging  bpm. BP after exercise 140/92.  Barriers to return to prior living situation: Medical status.  Recommendations for discharge: Anticipate home with assist and HH PT.   Rationale for recommendations: Pt moving well - would be safe to dc home once medically appropriate. Recommend use of AD for stability with ambulation.

## 2018-07-01 NOTE — PLAN OF CARE
Problem: Patient Care Overview  Goal: Plan of Care/Patient Progress Review  Outcome: Improving  D: Admitted with HTN urgency/ HF symtpoms  I/A:  -170's/'s. Cross-cover notified of rising SBP/DBP, changed PRN hydralazine to include DBP parameters.  No other complaints.  Voiding adequate amounts in BSC, up independently to BSC. Frequently requesting water and ice chips  P:  Continue to monitor BP, fluid status and notify team with concerns.  Possible dc in 1-2 days

## 2018-07-01 NOTE — PLAN OF CARE
Problem: Patient Care Overview  Goal: Plan of Care/Patient Progress Review  OT/6C:  Discharge Planner OT   Patient plan for discharge: home  Current status: Pt educated on EC/WS techniques and HEP. Pt SBA for in room mobility  Barriers to return to prior living situation: medical status   Recommendations for discharge: home with home therapy   Rationale for recommendations: to progress ADL I and endurance        Entered by: Love Mccracken 07/01/2018 1:43 PM

## 2018-07-01 NOTE — CONSULTS
Nephrology Initial Consult  July 1, 2018      Jaja Ernandez MRN:[id number] YOB: 1940  Date of Admission:6/29/2018  Primary care provider: No Ref-Primary, Physician  Requesting physician: Mic Ochoa MD    ASSESSMENT AND RECOMMENDATIONS:   Jaja Ernandez is a 78 year old female with a PMH of HTN , CKDIV (baseline creat 1.6-2 with GFR in 20s in 2016) with proteinuria, HFrEF , NICM, non malignant brain tumor resection 1997 and KURT for some uterine cyst 1990s presented with HTN emergency sec to medication non compliance. Nephrology consulted for creatinine elevation and possible TATIANA    CKD IV, possibly CKD V / creatinine elevation from TATIANA from HTN emergency  With a lapse in follow up for ~2yrs and medication non compliance it is difficult to say if the creatinine of 3.4 with GFR ~<15 is her baseline or it is an acute injury from uncontrolled HTN.   She likely has CKD sec to HTN nephrosclerosis, possibility of APOL1, uncontrolled HTN and chronic CRS  Tatiana is possible with HTN emergency and nephrosclerosis as well as a pre-renal process like CRS with HFrEF excerbation  -- will get aldosterone , Renin -- she has been off spironolactone , there is a possibility of renin being high on BB but if she has primary yesenia this may still show up with renin suppression  -- Will get renal USg and parathyroid hormone level, to establish chronicity, albumin of 3 kind of suggest chronicity as well  -- Peripheral smear for TMA sec to HTN for establishing additional factors for acute worsening of renal functions  -- please avoid aggressive control of BP to <150/100, all the meds have been added in the past 48hrs and the BP has dropped from 245/110 to 145/90 which may be more aggressive   -- agree with holding spironolactone and lisinopril but she will need to be assessed once the creatinine baseline is establised to be started on these meds arpita for MANASA blockade  -- Decrease lasix to 40mg BiD PO , since she has no  edema and volume depletion may not be ideal   -- with hydralazine and imdur she is on arterial and venodilators , agree with that plan for now  -- Replete potassium with 40meq twice daily for now    She was counseled about medication compliance that her goal will be to control BP better to improve her chances to avoid renal failure, since she does not want dialysis in future. She will need to follow up with nephrology for medication optimization , restrting spironolactone and lisinopril when optimal as outpatient    Electrolytes  Hypokalemia  Chronic since 2006 in records  Suggestive of primary hyperaldosteronism  Replete as above    Bicarb 27  Has hx of metabolic alkalosis with hypokalemia in the past labs  Stable for now    Anemia  Hb ~9 suggestive of ACD from CKD  Will follow  Check iron panel    MBD -CKD  Calcium wnl  Phos and PTH added to labs    HFrEF 35% in 2015 now 20-30%  With MR and TR with severe pulmHTN (PAP >75)  cardiology following     Recommendations were communicated to primary team     Seen and discussed with Dr. Oscar Amor MD   [phone]      REASON FOR CONSULT: elevated creatinine , TATIANA with HTN emergency    HISTORY OF PRESENT ILLNESS:  Jaja Ernandez is a 78 year old female with a PMH of HTN , CKDIV (baseline creat 1.6-2 with GFR in 20s in 2016) with proteinuria, HFrEF , NICM, non malignant brain tumor resection 1997 and KURT for some uterine cyst 1990s presented with HTN emergency sec to medication non compliance. Nephrology consulted for creatinine elevation and possible TATIANA  She has a hx of HTN since 1980s when she was in her 30s, she had been on anti HTN meds that she has taken off and on. She reports prior to that she had 6 pregnancies with 1 Csection for breech but wo any gestational diseases. She had some work up done in 2007 and that was significant for low renin and mild elevated yesenia although what meds for HTN she was on at that time is unknown. She had been following with  nephrology but stopped in  for unknown reasons. She had been taking some herbal treatment for her HTN and stopped all her medications for about 2-3 months prior to admission. She reports that she was developing increasing LE edema and fatigue with wheezing and asthma like symptoms for which she used her humidifier and some breathing exercises with a incentive spirometer. Her last hospital admission was . She reports that she has been compliant with salt restriction.   She refuses to be on HD since her  had been on dialysis for 13yrs and  on dialysis from MI.     PAST MEDICAL HISTORY:  Reviewed with patient on 2018     Past Medical History:   Diagnosis Date     Cataract      Chronic renal failure, stage 3 (moderate)      Congestive heart failure, unspecified      Hypertension      Nonischemic cardiomyopathy (H) 2015     Other nonspecific abnormal cardiovascular system function study 5/15/2015     Systolic CHF (H)     LVEF 35-40% 3/2015       Past Surgical History:   Procedure Laterality Date     ANGIOGRAM      3/2015     BRAIN TUMOR RESECTION  1997    Benign brain tumor     csection       HYSTERECTOMY TOTAL ABDOMINAL      benign condition        MEDICATIONS:  PTA Meds- not taken for 2-3months  Prior to Admission medications    Medication Sig Last Dose Taking? Auth Provider   Ascorbic Acid (VITAMIN C PO) Take 500 mg by mouth daily  2018 at 0800 Yes Reported, Patient   aspirin 81 MG tablet Take 81 mg by mouth daily  2018 at 0800 Yes Reported, Patient   Aspirin-Salicylamide-Caffeine (BC HEADACHE POWDER PO) Take 1 powder on the tongue every 6 hours with water as needed for headache 2018 at 0400 Yes Unknown, Entered By History   calcium-vitamin D (CALTRATE) 600-400 MG-UNIT per tablet Take 1 tablet by mouth daily 2018 at 0800 Yes Unknown, Entered By History   ferrous sulfate (IRON) 325 (65 FE) MG tablet Take 1 tablet (325 mg) by mouth 2 times daily 2018 at 0800 Yes  Dave Correa MD   garlic 150 MG TABS Take 150 mg by mouth daily 6/28/2018 at 0800 Yes Unknown, Entered By History   Multiple Vitamins-Minerals (CENTRUM SILVER) per tablet Take 1 tablet by mouth daily 6/28/2018 at 0800 Yes Unknown, Entered By History   vitamin E (VITAMIN E-400) 400 UNIT capsule Take 400 Units by mouth daily  6/28/2018 at 0800 Yes Reported, Patient   amLODIPine (NORVASC) 10 MG tablet Take 1 tablet (10 mg) by mouth daily More than a month at Unknown time  Candy Patterson MD   atorvastatin (LIPITOR) 20 MG tablet TAKE ONE TABLET BY MOUTH ONCE DAILY More than a month at Unknown time  Candy Patterson MD   carvedilol (COREG) 25 MG tablet TAKE ONE TABLET (25MG) BY MOUTH TWICE DAILY WITH MEALS More than a month at Unknown time  Candy Patterson MD   docusate sodium (COLACE) 100 MG capsule Take 1 capsule (100 mg) by mouth 2 times daily as needed for constipation More than a month at Unknown time  Candy Patterson MD   furosemide (LASIX) 20 MG tablet TAKE ONE TABLET BY MOUTH EVERY OTHER DAY More than a month at Unknown time  Candy Patterson MD   isosorbide mononitrate (IMDUR) 60 MG 24 hr tablet Take 1 tablet (60 mg) by mouth daily More than a month at Unknown time  Candy Patterson MD   lisinopril (PRINIVIL/ZESTRIL) 40 MG tablet TAKE ONE TABLET BY MOUTH ONCE DAILY More than a month at Unknown time  Candy Patterson MD   spironolactone (ALDACTONE) 25 MG tablet Take 0.5 tablets (12.5 mg) by mouth daily More than a month at Unknown time  Rupa Trinh, NASIR      Current Meds    amLODIPine  10 mg Oral At Bedtime     ascorbic acid (VITAMIN C) tablet 500 mg  500 mg Oral Daily     aspirin  81 mg Oral Daily     atorvastatin  20 mg Oral Daily     calcium-vitamin D  1 tablet Oral Daily     ferrous sulfate  325 mg Oral BID     furosemide  40 mg Intravenous Q12H     heparin  5,000 Units Subcutaneous Q12H     hydrALAZINE  50 mg Oral TID     isosorbide  mononitrate  60 mg Oral BID     multivitamin, therapeutic  1 tablet Oral Daily     sodium chloride (PF)  3 mL Intracatheter Q8H     Infusion Meds      ALLERGIES:    Allergies   Allergen Reactions     Demerol [Meperidine] Nausea and Vomiting       REVIEW OF SYSTEMS:  A comprehensive of systems was negative except as noted above.    SOCIAL HISTORY:   Social History     Social History     Marital status:      Spouse name: N/A     Number of children: N/A     Years of education: N/A     Occupational History     Not on file.     Social History Main Topics     Smoking status: Never Smoker     Smokeless tobacco: Never Used     Alcohol use No     Drug use: No     Sexual activity: No     Other Topics Concern     Parent/Sibling W/ Cabg, Mi Or Angioplasty Before 65f 55m? No     Social History Narrative     Reviewed with patient       FAMILY MEDICAL HISTORY:   Family History   Problem Relation Age of Onset     Breast Cancer Mother 50     Cancer Mother      Asthma Other      Unknown/Adopted Father      Breast Cancer Sister 70     Cancer Sister      Hypertension Daughter      Diabetes No family hx of      Cerebrovascular Disease No family hx of      Thyroid Disease No family hx of      Glaucoma No family hx of      Macular Degeneration No family hx of      Reviewed with patient     PHYSICAL EXAM:   Temp  Av.3  F (36.8  C)  Min: 97.4  F (36.3  C)  Max: 99.4  F (37.4  C)      Pulse  Av  Min: 89  Max: 89 Resp  Av  Min: 8  Max: 25  SpO2  Av.4 %  Min: 90 %  Max: 99 %       BP (!) 145/95 (BP Location: Right arm)  Pulse 89  Temp 97.4  F (36.3  C) (Oral)  Resp 18  Ht 1.524 m (5')  Wt 84.6 kg (186 lb 6.4 oz)  SpO2 96%  BMI 36.4 kg/m2   Date 18 0700 - 18 0659   Shift 6574-9509 8206-5950 4966-5579 24 Hour Total   I  N  T  A  K  E   P.O. 720   720    Shift Total  (mL/kg) 720  (8.52)   720  (8.52)   O  U  T  P  U  T   Urine 575   575    Shift Total  (mL/kg) 575  (6.8)   575  (6.8)   Weight (kg)  84.55 84.55 84.55 84.55        Admit Weight: 89.7 kg (197 lb 12 oz)     GENERAL APPEARANCE: no distress,  awake  EYES: - scleral icterus, pupils equal  Endo: - goiter, - moon facies  Lymphatics: no cervical or supraclavicular LAD  Pulmonary: lungs clear to auscultation with equal breath sounds bilaterally, - clubbing  CV: regular rhythm, normal rate, no rub   - JVD -   - Edema -  GI: soft, nontender, normal bowel sounds  MS: no evidence of inflammation in joints, no muscle tenderness  : - maravilla  SKIN: no rash, warm, dry, no cyanosis  NEURO: face symmetric, - asterixis     LABS:   CMP  Recent Labs  Lab 07/01/18  0545 06/30/18  0540 06/29/18 1818 06/29/18  1232    146* 143 143   POTASSIUM 3.3* 3.2* 3.1* 3.1*   CHLORIDE 106 109 107 106   CO2 27 26 24 25   ANIONGAP 11 11 11 12   * 133* 138* 125*   BUN 31* 34* 34* 32*   CR 3.46* 3.44* 3.53* 3.34*   GFRESTIMATED 13* 13* 13* 13*   GFRESTBLACK 16* 16* 15* 16*   CHICHO 8.8 9.2 8.8 8.8   MAG 2.3 2.3  --   --    PROTTOTAL 6.3* 6.5*  --  6.7*   ALBUMIN 3.0* 3.1*  --  3.3*   BILITOTAL 0.7 1.5*  --  1.0   ALKPHOS 128 141  --  145   * 168*  --  241*   * 207*  --  254*     CBC  Recent Labs  Lab 07/01/18  0545 06/30/18  0540 06/29/18 1818 06/29/18  1232   HGB 9.4* 9.9*  --  9.8*   WBC 8.2 7.9  --  6.9   RBC 3.26* 3.35*  --  3.40*   HCT 29.7* 29.7*  --  30.9*   MCV 91 89  --  91   MCH 28.8 29.6  --  28.8   MCHC 31.6 33.3  --  31.7   RDW 17.2* 16.8*  --  16.4*    270 237 251     INR  Recent Labs  Lab 06/29/18  1232   INR 1.27*   PTT 33     ABGNo lab results found in last 7 days.   URINE STUDIES  Recent Labs   Lab Test  06/29/18   1928  04/28/15   1048   COLOR  Light Yellow  Yellow   APPEARANCE  Clear  Clear   URINEGLC  Negative  Negative   URINEBILI  Negative  Negative   URINEKETONE  Negative  Negative   SG  1.006  >1.030   UBLD  Trace*  Negative   URINEPH  5.5  5.5   PROTEIN  100*  100*   UROBILINOGEN   --   0.2   NITRITE  Negative  Negative    LEUKEST  Negative  Negative   RBCU   --   O - 2   WBCU   --   O - 2     Recent Labs   Lab Test  04/28/15   1047   UTPG  1.08*     PTH  Recent Labs   Lab Test  04/27/15   1106   PTHI  85*     IRON STUDIES  Recent Labs   Lab Test  04/27/15   1106  03/31/15   0717   IRON  64  26*   FEB  312  288   IRONSAT  20  9*   RAHEEL  42  102       IMAGING:  All imaging studies reviewed by me.     Norman Amor MD

## 2018-07-01 NOTE — PROGRESS NOTES
" 07/01/18 0927   Quick Adds   Type of Visit Initial PT Evaluation   Living Environment   Lives With child(jai), adult  (Daughter.)   Living Arrangements apartment  ([place]ior apartment building[place]   Home Accessibility stairs to enter home;grab bars present (bathtub);bed and bath on same level;tub/shower is not walk in   Number of Stairs to Enter Home 2  (1 HR.)   Number of Stairs Within Home 0  (First floor apartment.)   Transportation Available car;family or friend will provide   Living Environment Comment Pt's daughter has hx of CVA and ambulates with a walker. Pt states she has been \"homebound\" in recent weeks 2/2 \"laziness and feeling depressed\" - states \"she was physically able to get out and do things but didn't want to\".   Self-Care   Usual Activity Tolerance moderate   Current Activity Tolerance fair   Regular Exercise no   Equipment Currently Used at Home grab bar;shower chair  (Does not use shower chair.)   Functional Level Prior   Ambulation 0-->independent   Transferring 0-->independent   Toileting 0-->independent   Bathing 0-->independent   Dressing 0-->independent   Eating 0-->independent   Communication 0-->understands/communicates without difficulty   Swallowing 0-->swallows foods/liquids without difficulty   Cognition 0 - no cognition issues reported   Fall history within last six months no   Which of the above functional risks had a recent onset or change? ambulation   Prior Functional Level Comment Pt reports IND with all mobility and ADLs/IADLs with exception of driving. States one of her daughters assists with transportation - pt reports this has contributed to her \"laziness and feeling depressed\" as she feels dependent on other people, and is used to being very independent.   General Information   Onset of Illness/Injury or Date of Surgery - Date 06/29/18   Referring Physician Sharon Keys MD    Patient/Family Goals Statement Pt would like to return home.   Pertinent History of Current " Problem (include personal factors and/or comorbidities that impact the POC) 77 y/o female wit Hx of HFrEF (EF 35%), HTN , CKD III , who presented with HFrEF excacerbation and HTN urgency.    Precautions/Limitations no known precautions/limitations   Heart Disease Risk Factors High blood pressure;Lack of physical activity;Medical history;Age   General Observations Upon arrival, pt supine in bed and agreeable to PT session.   General Info Comments Activity - up with assist.   Cognitive Status Examination   Orientation orientation to person, place and time   Level of Consciousness alert   Follows Commands and Answers Questions 100% of the time   Personal Safety and Judgment intact   Memory intact   Pain Assessment   Patient Currently in Pain No   Posture    Posture Forward head position;Protracted shoulders   Range of Motion (ROM)   ROM Comment B LE ROM WFL.   Strength   Strength Comments B LE strength WFL - grossly at least 4/5 to 4+/5.   Bed Mobility   Bed Mobility Comments IND with supine <> sit.   Transfer Skills   Transfer Comments Mod I for sit <> stand.   Gait   Gait Comments Ambulates ~200ft 2x, first bout with FWW + SBA, second bout with HHA from PT and CGA. Seated breaks taken upon completion of each bout 2/2 fatigue and mild SOB. Ascends/descends 3 steps with single HR + SBA.   Balance   Balance Comments Requires use of AD or HHA from PT for safe ambulation.   General Therapy Interventions   Planned Therapy Interventions balance training;gait training;strengthening;home program guidelines;progressive activity/exercise   Clinical Impression   Criteria for Skilled Therapeutic Intervention yes, treatment indicated   PT Diagnosis Impaired functional mobility   Influenced by the following impairments LE weakness/deconditioning, impaired balance, decreased activity tolerance   Functional limitations due to impairments Gait, stairs, functional endurance   Clinical Presentation Stable/Uncomplicated   Clinical  "Presentation Rationale Clinical judgement   Clinical Decision Making (Complexity) Low complexity   Therapy Frequency` 5 times/week   Predicted Duration of Therapy Intervention (days/wks) 1 week   Anticipated Equipment Needs at Discharge (TBD with further therapy)   Anticipated Discharge Disposition Home with Assist;Home with Home Therapy   Risk & Benefits of therapy have been explained Yes   Patient, Family & other staff in agreement with plan of care Yes   [facility] TM \"6 Clicks\"   2016, Trustees of [facility], under license to [employer].  All rights reserved.   6 Clicks Short Forms Basic Mobility Inpatient Short Form   [facility]  \"6 Clicks\" V.2 Basic Mobility Inpatient Short Form   1. Turning from your back to your side while in a flat bed without using bedrails? 4 - None   2. Moving from lying on your back to sitting on the side of a flat bed without using bedrails? 4 - None   3. Moving to and from a bed to a chair (including a wheelchair)? 4 - None   4. Standing up from a chair using your arms (e.g., wheelchair, or bedside chair)? 4 - None   5. To walk in hospital room? 3 - A Little   6. Climbing 3-5 steps with a railing? 3 - A Little   Basic Mobility Raw Score (Score out of 24.Lower scores equate to lower levels of function) 22   Total Evaluation Time   Total Evaluation Time (Minutes) 8     "

## 2018-07-02 ENCOUNTER — TELEPHONE (OUTPATIENT)
Dept: NEPHROLOGY | Facility: CLINIC | Age: 78
End: 2018-07-02

## 2018-07-02 ENCOUNTER — APPOINTMENT (OUTPATIENT)
Dept: ULTRASOUND IMAGING | Facility: CLINIC | Age: 78
DRG: 280 | End: 2018-07-02
Payer: MEDICARE

## 2018-07-02 ENCOUNTER — APPOINTMENT (OUTPATIENT)
Dept: OCCUPATIONAL THERAPY | Facility: CLINIC | Age: 78
DRG: 280 | End: 2018-07-02
Payer: MEDICARE

## 2018-07-02 VITALS
OXYGEN SATURATION: 95 % | BODY MASS INDEX: 35.95 KG/M2 | TEMPERATURE: 97.7 F | DIASTOLIC BLOOD PRESSURE: 81 MMHG | HEIGHT: 60 IN | RESPIRATION RATE: 20 BRPM | SYSTOLIC BLOOD PRESSURE: 136 MMHG | HEART RATE: 102 BPM | WEIGHT: 183.1 LBS

## 2018-07-02 LAB
ANION GAP SERPL CALCULATED.3IONS-SCNC: 12 MMOL/L (ref 3–14)
BUN SERPL-MCNC: 29 MG/DL (ref 7–30)
CALCIUM SERPL-MCNC: 8.7 MG/DL (ref 8.5–10.1)
CHLORIDE SERPL-SCNC: 103 MMOL/L (ref 94–109)
CO2 SERPL-SCNC: 25 MMOL/L (ref 20–32)
CREAT SERPL-MCNC: 3.29 MG/DL (ref 0.52–1.04)
GFR SERPL CREATININE-BSD FRML MDRD: 14 ML/MIN/1.7M2
GLUCOSE SERPL-MCNC: 110 MG/DL (ref 70–99)
INTERPRETATION ECG - MUSE: NORMAL
IRON SATN MFR SERPL: 6 % (ref 15–46)
IRON SERPL-MCNC: 15 UG/DL (ref 35–180)
MAGNESIUM SERPL-MCNC: 2.1 MG/DL (ref 1.6–2.3)
PLATELET # BLD AUTO: 292 10E9/L (ref 150–450)
POTASSIUM SERPL-SCNC: 3.1 MMOL/L (ref 3.4–5.3)
SODIUM SERPL-SCNC: 140 MMOL/L (ref 133–144)
TIBC SERPL-MCNC: 236 UG/DL (ref 240–430)

## 2018-07-02 PROCEDURE — 82088 ASSAY OF ALDOSTERONE: CPT | Performed by: INTERNAL MEDICINE

## 2018-07-02 PROCEDURE — 25000132 ZZH RX MED GY IP 250 OP 250 PS 637: Mod: GY | Performed by: STUDENT IN AN ORGANIZED HEALTH CARE EDUCATION/TRAINING PROGRAM

## 2018-07-02 PROCEDURE — 99239 HOSP IP/OBS DSCHRG MGMT >30: CPT | Performed by: INTERNAL MEDICINE

## 2018-07-02 PROCEDURE — 80048 BASIC METABOLIC PNL TOTAL CA: CPT | Performed by: STUDENT IN AN ORGANIZED HEALTH CARE EDUCATION/TRAINING PROGRAM

## 2018-07-02 PROCEDURE — 25000128 H RX IP 250 OP 636: Performed by: INTERNAL MEDICINE

## 2018-07-02 PROCEDURE — 84244 ASSAY OF RENIN: CPT | Performed by: INTERNAL MEDICINE

## 2018-07-02 PROCEDURE — 25000132 ZZH RX MED GY IP 250 OP 250 PS 637: Mod: GY | Performed by: INTERNAL MEDICINE

## 2018-07-02 PROCEDURE — 85049 AUTOMATED PLATELET COUNT: CPT | Performed by: INTERNAL MEDICINE

## 2018-07-02 PROCEDURE — 40000133 ZZH STATISTIC OT WARD VISIT

## 2018-07-02 PROCEDURE — 25000132 ZZH RX MED GY IP 250 OP 250 PS 637: Mod: GY | Performed by: NURSE PRACTITIONER

## 2018-07-02 PROCEDURE — A9270 NON-COVERED ITEM OR SERVICE: HCPCS | Mod: GY | Performed by: INTERNAL MEDICINE

## 2018-07-02 PROCEDURE — 83540 ASSAY OF IRON: CPT | Performed by: STUDENT IN AN ORGANIZED HEALTH CARE EDUCATION/TRAINING PROGRAM

## 2018-07-02 PROCEDURE — 97535 SELF CARE MNGMENT TRAINING: CPT | Mod: GO

## 2018-07-02 PROCEDURE — 76770 US EXAM ABDO BACK WALL COMP: CPT

## 2018-07-02 PROCEDURE — 83735 ASSAY OF MAGNESIUM: CPT | Performed by: STUDENT IN AN ORGANIZED HEALTH CARE EDUCATION/TRAINING PROGRAM

## 2018-07-02 PROCEDURE — 83550 IRON BINDING TEST: CPT | Performed by: STUDENT IN AN ORGANIZED HEALTH CARE EDUCATION/TRAINING PROGRAM

## 2018-07-02 PROCEDURE — A9270 NON-COVERED ITEM OR SERVICE: HCPCS | Mod: GY | Performed by: STUDENT IN AN ORGANIZED HEALTH CARE EDUCATION/TRAINING PROGRAM

## 2018-07-02 PROCEDURE — 36415 COLL VENOUS BLD VENIPUNCTURE: CPT | Performed by: INTERNAL MEDICINE

## 2018-07-02 PROCEDURE — 36415 COLL VENOUS BLD VENIPUNCTURE: CPT | Performed by: STUDENT IN AN ORGANIZED HEALTH CARE EDUCATION/TRAINING PROGRAM

## 2018-07-02 PROCEDURE — A9270 NON-COVERED ITEM OR SERVICE: HCPCS | Mod: GY | Performed by: NURSE PRACTITIONER

## 2018-07-02 RX ORDER — CARVEDILOL 6.25 MG/1
6.25 TABLET ORAL 2 TIMES DAILY WITH MEALS
Status: DISCONTINUED | OUTPATIENT
Start: 2018-07-02 | End: 2018-07-02 | Stop reason: HOSPADM

## 2018-07-02 RX ORDER — CARVEDILOL 6.25 MG/1
6.25 TABLET ORAL 2 TIMES DAILY WITH MEALS
Qty: 60 TABLET | Refills: 0 | Status: SHIPPED | OUTPATIENT
Start: 2018-07-02 | End: 2018-07-31

## 2018-07-02 RX ORDER — POTASSIUM CHLORIDE 750 MG/1
40 TABLET, EXTENDED RELEASE ORAL ONCE
Status: COMPLETED | OUTPATIENT
Start: 2018-07-02 | End: 2018-07-02

## 2018-07-02 RX ORDER — HYDRALAZINE HYDROCHLORIDE 50 MG/1
50 TABLET, FILM COATED ORAL 3 TIMES DAILY
Qty: 60 TABLET | Refills: 0 | Status: SHIPPED | OUTPATIENT
Start: 2018-07-02 | End: 2018-07-19

## 2018-07-02 RX ORDER — ATORVASTATIN CALCIUM 20 MG/1
20 TABLET, FILM COATED ORAL DAILY
Qty: 30 TABLET | Refills: 0 | Status: SHIPPED | OUTPATIENT
Start: 2018-07-03 | End: 2018-07-31

## 2018-07-02 RX ORDER — FERROUS SULFATE 325(65) MG
325 TABLET ORAL 2 TIMES DAILY
Qty: 100 TABLET | Refills: 0 | Status: SHIPPED | OUTPATIENT
Start: 2018-07-02 | End: 2019-10-15

## 2018-07-02 RX ORDER — ISOSORBIDE MONONITRATE 60 MG/1
60 TABLET, EXTENDED RELEASE ORAL 2 TIMES DAILY
Qty: 30 TABLET | Refills: 0 | Status: SHIPPED | OUTPATIENT
Start: 2018-07-02 | End: 2018-07-19

## 2018-07-02 RX ORDER — AMLODIPINE BESYLATE 10 MG/1
10 TABLET ORAL AT BEDTIME
Qty: 30 TABLET | Refills: 0 | Status: SHIPPED | OUTPATIENT
Start: 2018-07-02 | End: 2018-07-31

## 2018-07-02 RX ORDER — FUROSEMIDE 40 MG
40 TABLET ORAL
Qty: 30 TABLET | Refills: 0 | Status: SHIPPED | OUTPATIENT
Start: 2018-07-02 | End: 2018-07-10

## 2018-07-02 RX ORDER — POTASSIUM CHLORIDE 1.5 G/1.58G
60 POWDER, FOR SOLUTION ORAL ONCE
Status: DISCONTINUED | OUTPATIENT
Start: 2018-07-02 | End: 2018-07-02

## 2018-07-02 RX ADMIN — FERROUS SULFATE TAB 325 MG (65 MG ELEMENTAL FE) 325 MG: 325 (65 FE) TAB at 09:34

## 2018-07-02 RX ADMIN — HYDRALAZINE HYDROCHLORIDE 50 MG: 50 TABLET ORAL at 13:50

## 2018-07-02 RX ADMIN — HYDRALAZINE HYDROCHLORIDE 50 MG: 50 TABLET ORAL at 09:34

## 2018-07-02 RX ADMIN — FUROSEMIDE 40 MG: 40 TABLET ORAL at 09:34

## 2018-07-02 RX ADMIN — ISOSORBIDE MONONITRATE 60 MG: 60 TABLET, EXTENDED RELEASE ORAL at 09:34

## 2018-07-02 RX ADMIN — THERA TABS 1 TABLET: TAB at 09:33

## 2018-07-02 RX ADMIN — HEPARIN SODIUM 5000 UNITS: 5000 INJECTION, SOLUTION INTRAVENOUS; SUBCUTANEOUS at 05:22

## 2018-07-02 RX ADMIN — ATORVASTATIN CALCIUM 20 MG: 20 TABLET, FILM COATED ORAL at 09:34

## 2018-07-02 RX ADMIN — CARVEDILOL 6.25 MG: 6.25 TABLET, FILM COATED ORAL at 12:36

## 2018-07-02 RX ADMIN — OXYCODONE HYDROCHLORIDE AND ACETAMINOPHEN 500 MG: 500 TABLET ORAL at 09:34

## 2018-07-02 RX ADMIN — ASPIRIN 81 MG: 81 TABLET, COATED ORAL at 09:34

## 2018-07-02 RX ADMIN — POTASSIUM CHLORIDE 40 MEQ: 750 TABLET, EXTENDED RELEASE ORAL at 12:37

## 2018-07-02 RX ADMIN — Medication 1 TABLET: at 09:33

## 2018-07-02 NOTE — PLAN OF CARE
Problem: Patient Care Overview  Goal: Plan of Care/Patient Progress Review  Discharge Planner OT   Patient plan for discharge: Home  Current status: Declining OOB activity this AM and PM as pt wanting to prepare for discharge. Reviewed energy conservation/work simplification strategies for increased fatigue management and safety during ADLs/IADLs as well as continued importance of home exercise programming, pt receptive to all education  Barriers to return to prior living situation: decreased strength/activity tolerance  Recommendations for discharge: Home with  PT  Rationale for recommendations: Pt would benefit from continued PT services to maximize strength/activity tolerance to increase independence.         Entered by: Julia Vela 07/02/2018 1:40 PM

## 2018-07-02 NOTE — PLAN OF CARE
Problem: Patient Care Overview  Goal: Plan of Care/Patient Progress Review  PT: check in with pt this AM, pt on phone call not able to take a braek  2nd time check in pt, pt in with other staff.

## 2018-07-02 NOTE — DISCHARGE SUMMARY
Cardiology Discharge Summary  Jaja Ernandez MRN: [id number]  1940  Primary care provider: Edi Ribeiro  ___________________________________          Date of Admission:  6/29/2018  Date of Discharge:  07/02/2018  Admitting Physician:  Mic Ochoa MD  Discharge Physician:  Darrel Pepe MD  Discharging Service:  Cardiology 1         Reason for Admission:     Jaja Ernandez is a 78-year-old female with medical history significant for HFrEF and HTN who is admitted on 6/29 with HTN emergency and decompensated heart failure with TATIANA due to medication non-compliance (please see more detail in H&P)           Discharge Diagnosis:     1. Acute decompensated heart failure   2. HFrEF (NICM)  3. Moderate to severe MR  4. Severe TR   5. Severe PHT  6. Elevated troponin    7. HTN urgency  8. Non-onliguric TATIANA on CKD  9. Normocytic anemia         Procedures & Significant Findings:     TTE (7/2/2018)  Prominent left ventricular hypertrophy with global hypokinesis and severe left  ventricular systolic dysfunction (EF 20-30%).  Moderate to severe mitral valve regurgitation with central jet.  Severe tricuspid valve regurgitation with central jet.  Severe pulmonary hypertension with dilated IVC and right ventricular systolic  Dysfunction.    Renal US (6/29/2018)   Suboptimal exam with poor beam penetration owing to by habitus and  difficulty obtaining optimal acoustic windows.  1. Echogenic renal parenchyma consistent with medical renal disease.  Asymmetric smaller right kidney. No hydronephrosis.         2. The renal arteries are not well evaluated at the [place] and there is  the suggestion of abdomen waveforms, especially on the right; cannot  exclude a hemogram of a significant stenosis. Given above limitation,  angiographic cross-sectional imaging could be considered if indicated.  3. Incidentally noted cholelithiasis.         Consultations:     - Nephrology  consult          Hospital Course by Problem:      # Acute decompensated heart failure   # HFrEF (NICM)  # Moderate to severe MR  # Severe TR   # Severe PHT   - Suspect all related to med non-compliance and uncontrolled HTN. TTE (6/29) showed LVEF 20-30% with moderate to severe mitral valve regurgitation, severe tricuspid valve regurgitation. Severe pulmonary hypertension.  - Patient was seen in cardiology clinic 2 years ago then loss to follow up  - Diuresis with 40 mg furosemide IV bid (6/29-6/30) then switch to furosemide 40 mg PO bid on 7/1  - Afterload reduction: imdur 60 mg bid and hydralazine 50 mg tid  - Prevention: ASA 81mg q day and atorvastatin 20 mg q day  - Reduced home carvedilol to 6.25 mg bid  - Hold lisinopril and spirolactone due to TATIANA.   - Weight on DC 83.1 kg   - Re-establish with [facility] (appointment 7/10/2018)  - Cardiac rehab referral      # Elevated troponin   - Troponin peaked at 0.124. Likely type II in the setting of above. Coronary angiography was negative in 5/2015       # HTN urgency - resolved  - Treat as above      # Non-onliguric TATIANA on CKD  - Suspect cardiorenal/HTN vs change in CKD baseline   - Nephrology consult for TATIANA/CKD  - Nephrology outpatient follow up within 2-3 weeks        # Normocytic anemia  - Uncertain baseline. Chronic anemia of CKD +/- dilution effect. No signs of bleeding.   - Resume ferrous sulfate      Physical Exam on day of Discharge:  Blood pressure 136/81, pulse 102, temperature 97.7  F (36.5  C), temperature source Oral, resp. rate 20, height 1.524 m (5'), weight 83.1 kg (183 lb 1.6 oz), SpO2 95 %, not currently breastfeeding.     GEN: NAD, resting comfortably on exam, pleasant and cooperative , AAox3  HEENT: NC/AT , well injected conjunctiva, anicteric sclera, EOMI, PERRL, MMM  Neck: trachea midline, JVP to jaw  CV: RRR, nl S1/S2 no mumurs  PULM: basilar crackles    Abdomen: soft, non tender/distented , BS +   EXTREMITIES: warm, +2 pedal edema  SKIN: No  rashes, sores or ulcerations  NEURO: MS: AAOx3 - no focal deficit noted         Discharge Medications:     Discharge Medication List as of 7/2/2018  2:14 PM      START taking these medications    Details   hydrALAZINE (APRESOLINE) 50 MG tablet Take 1 tablet (50 mg) by mouth 3 times daily, Disp-60 tablet, R-0, E-Prescribe         CONTINUE these medications which have CHANGED    Details   carvedilol (COREG) 6.25 MG tablet Take 1 tablet (6.25 mg) by mouth 2 times daily (with meals), Disp-60 tablet, R-0, E-Prescribe      furosemide (LASIX) 40 MG tablet Take 1 tablet (40 mg) by mouth 2 times daily, Disp-30 tablet, R-0, E-Prescribe         CONTINUE these medications which have NOT CHANGED    Details   Ascorbic Acid (VITAMIN C PO) Take 500 mg by mouth daily , Historical      aspirin 81 MG tablet Take 81 mg by mouth daily , Historical      Aspirin-Salicylamide-Caffeine (BC HEADACHE POWDER PO) Take 1 powder on the tongue every 6 hours with water as needed for headache, Historical      calcium-vitamin D (CALTRATE) 600-400 MG-UNIT per tablet Take 1 tablet by mouth daily, Historical      garlic 150 MG TABS Take 150 mg by mouth daily, Historical      Multiple Vitamins-Minerals (CENTRUM SILVER) per tablet Take 1 tablet by mouth daily, Historical      vitamin E (VITAMIN E-400) 400 UNIT capsule Take 400 Units by mouth daily , Historical      docusate sodium (COLACE) 100 MG capsule Take 1 capsule (100 mg) by mouth 2 times daily as needed for constipation, Disp-60 capsule, R-0, E-Prescribe      amLODIPine (NORVASC) 10 MG tablet Take 1 tablet (10 mg) by mouth daily, Disp-90 tablet, R-3, E-Prescribe      atorvastatin (LIPITOR) 20 MG tablet TAKE ONE TABLET BY MOUTH ONCE DAILY, Disp-30 tablet, R-0, E-PrescribePt needs to make appt with provider for more refills or obtain from primary physician      ferrous sulfate (IRON) 325 (65 FE) MG tablet Take 1 tablet (325 mg) by mouth 2 times daily, Disp-60 tablet, R-3, Fax      isosorbide  mononitrate (IMDUR) 60 MG 24 hr tablet Take 1 tablet (60 mg) by mouth daily, Disp-90 tablet, R-3, E-Prescribe         STOP taking these medications       carboxymethylcellulose (REFRESH PLUS) 0.5 % SOLN Comments:   Reason for Stopping:         lisinopril (PRINIVIL/ZESTRIL) 40 MG tablet Comments:   Reason for Stopping:         spironolactone (ALDACTONE) 25 MG tablet Comments:   Reason for Stopping:                  Discharge Instructions and Follow-Up:     Discharge Procedure Orders  Nephrology Adult Referral     Cardiac Rehab Referral     Reason for your hospital stay   Order Comments: Acute kidney injury and acute heart failure     Adult [facility] Follow-up and recommended labs and tests   Order Comments: - Follow up with [facility] (Heart failure clinic) within 1 week, labs draw at visit   - Follow up with nephrology clinic within 2-3 weeks     Appointments on [facility] and/or [facility] (with [facility] or [facility] provider or service). Call [phone] if you haven't heard regarding these appointments within 7 days of discharge.     Activity   Order Comments: Your activity upon discharge: activity as tolerated   Order Specific Question Answer Comments   Is discharge order? Yes      Discharge Instructions   Order Comments: Please follow heart failure education instruction     When to contact your care team   Order Comments: Call your primary doctor if you have any of the following:  increased shortness of breath, chest pain, palpitation etc.     Full Code     Diet   Order Comments: Follow this diet upon discharge: Orders Placed This Encounter    2 gm NA Diet, 2 L fluid restriction   Order Specific Question Answer Comments   Is discharge order? Yes             Discharge Disposition:     Home with family          Condition on Discharge:   Discharge condition: Stable   Code status on discharge: Full Code        Date of service: 7/2/2018    Patient discussed with staff attending, Dr. Pepe and the note reflects our  "joint plan.     Supavit \"Mac\" MD [place]   PGY-2, Internal Medicine  Cardiology Service  Pager: [phone]  "

## 2018-07-02 NOTE — PLAN OF CARE
Problem: Patient Care Overview  Goal: Plan of Care/Patient Progress Review  Physical Therapy Discharge Summary    Reason for therapy discharge:    Discharged to home with home therapy.    Progress towards therapy goal(s). See goals on [facility] in [facility] record for goal details.  Goals partially met.  Barriers to achieving goals:   discharge from facility.    Therapy recommendation(s):    Continued therapy is recommended.  Rationale/Recommendations:  home PT to progress activity tolerance, strength, indep with functional mobility.

## 2018-07-02 NOTE — CONSULTS
"Jaja Ernandez is a 78 year old female presenting with Htn urgency and HF. [facility] consult requested. Mrs. Ernandez is currently admitted with HFrEF.     Jaja was provided with a CHF book.  I reviewed the importance of daily weights, 2 gm sodium diet, 2L fluid restriction and compliance with medications upon hospital discharge.  [facility] contact phone numbers provided and patient is encouraged to call with any questions or concerns, including any weight gain or loss of 2 or more pounds in 24 hours or 5 or more pounds in 1 week.      Pt didn't want to make an appt until her daughter who transports her arrives. Rn provided her phone number and patient will have daughter call to setup follow up appt, possibly in [place]. I will follow-up with the patient at that time, sooner if needed.  Thank you for the consult.    Valeria Cristobal RN BSN   Cardiology Care Coordinator - [facility]  Questions and [phone]  First press #1 for the [employer] and then press #3 for \"Medical Advise\" to reach us Cardiology Nurses.      "

## 2018-07-02 NOTE — TELEPHONE ENCOUNTER
Received message from Dr. Leiva requesting patient be seen in nephrology next week for hypertension and TATIANA follow up. Left voicemail for daughter to call back.    Iman yAala RN

## 2018-07-02 NOTE — PLAN OF CARE
Problem: Cardiac: Heart Failure (Adult)  Goal: Signs and Symptoms of Listed Potential Problems Will be Absent, Minimized or Managed (Cardiac: Heart Failure)  Signs and symptoms of listed potential problems will be absent, minimized or managed by discharge/transition of care (reference Cardiac: Heart Failure (Adult) CPG).   Outcome: Adequate for Discharge Date Met: 07/02/18  Discharge  Discharged to: Home  Via: Ambulatory  Accompanied by: Daughter  Belongings: all belongings with patient at time of discharge including clothing, wallet, and cell phone. New meds to be picked up at discharge pharmacy on her way out. No valuables checked in security.   Teaching: The patient has been instructed about HF mgmt after discharge and to review the written instructions provided on admission, during the hospital stay or on discharge. Instructed on Diet: A heart healthy diet ordered by MD; Activity: At the level ordered by MD; Weight Monitoring: Monitor the weight at least every other day or as ordered by MD; Worsening symptoms of HF: Monitor symptoms of worsening HF such as weight gain of 2# or more per day or 5# in a week, SOB, leg/ankle swelling, chest pain/ discomfort, decreased energy, fatigue or other symptoms as specified by MD; Call the MD when worsening symptom(s) occur; Medications: upon discharge; Follow-up: Review appointment(s) as directed by the MD; Smoking Cessation: Re-enforce cigarette smoking cessation and avoidance of cigarette smoke. Discussed medication management, signs to watch for, and when to call the MD if needed. Patient and daughter verbalized understanding of all discharge instructions.   Clinic appointment: Follow-up appts with [facility] and nephrology reviewed with patient and daughter who verbalized how to make or change if needed.   Report called/faxed: D/C paperwork to be faxed to [facility].   Tele and IV: Removed.

## 2018-07-02 NOTE — PROGRESS NOTES
Care Coordinator - Discharge Planning    Admission Date/Time:  6/29/2018  Attending MD:  Mic Ochoa MD   Data  Chart reviewed, discussed with interdisciplinary team.   Patient was admitted for:   1. Acute pulmonary edema with congestive heart failure (H)    2. Reduced ejection fraction concurrent with and due to acute on chronic heart failure (H)    3. Acute renal failure superimposed on stage 4 chronic kidney disease, unspecified acute renal failure type (H)    4. SPAIN (dyspnea on exertion)    5. Cough    6. Elevated troponin    7. Elevated brain natriuretic peptide (BNP) level    8. Chronic systolic congestive heart failure (HCC)    9. Ischemic cardiomyopathy    Assessment   Concerns with insurance coverage for discharge needs: None.  Current Living Situation: Patient said that she lives with her daughter, Lorelei.   Support System: Supportive and Involved daughters.   Services Involved: none currently.   Transportation at Discharge: Pt said that her daughter, Ally will provide her with a ride home.   Transportation to Medical Appointments: family to provide.     Coordination of Care and Referrals: No home care needs per pt.   PT/OT recommending home care; pt has declined home care and prefers to go to [facility] instead.     Pt said that she has a 4 wheeled walker and cane available to her for home use.   Plan  Anticipated Discharge Date:  7/2/18  Anticipated Discharge Plan:  discharge to home .     CTS Handoff completed:  YES    LINDSAY RICHARDSON RN BSN  Care Coordinator Unit   [phone]

## 2018-07-03 ENCOUNTER — CARE COORDINATION (OUTPATIENT)
Dept: CARE COORDINATION | Facility: CLINIC | Age: 78
End: 2018-07-03

## 2018-07-03 LAB
ALDOST SERPL-MCNC: 30.4 NG/DL
COPATH REPORT: NORMAL

## 2018-07-03 NOTE — PROGRESS NOTES
Patient was called two times and no answer so post 24 hr DC follow up calls will be closed out

## 2018-07-04 LAB
ALDOST SERPL-MCNC: 27.5 NG/DL
RENIN PLAS-CCNC: 0.6 NG/ML/H
RENIN PLAS-CCNC: <0.6 NG/ML/H

## 2018-07-09 ENCOUNTER — PRE VISIT (OUTPATIENT)
Dept: CARDIOLOGY | Facility: CLINIC | Age: 78
End: 2018-07-09

## 2018-07-09 ENCOUNTER — OFFICE VISIT (OUTPATIENT)
Dept: NEPHROLOGY | Facility: CLINIC | Age: 78
End: 2018-07-09
Payer: MEDICARE

## 2018-07-09 VITALS
WEIGHT: 187 LBS | HEART RATE: 94 BPM | BODY MASS INDEX: 36.52 KG/M2 | SYSTOLIC BLOOD PRESSURE: 136 MMHG | DIASTOLIC BLOOD PRESSURE: 88 MMHG

## 2018-07-09 DIAGNOSIS — D63.1 ANEMIA IN STAGE 3 CHRONIC KIDNEY DISEASE (H): ICD-10-CM

## 2018-07-09 DIAGNOSIS — I27.20 PULMONARY HYPERTENSION (H): ICD-10-CM

## 2018-07-09 DIAGNOSIS — N18.30 CKD (CHRONIC KIDNEY DISEASE) STAGE 3, GFR 30-59 ML/MIN (H): Primary | ICD-10-CM

## 2018-07-09 DIAGNOSIS — I50.20 HFREF (HEART FAILURE WITH REDUCED EJECTION FRACTION) (H): Primary | ICD-10-CM

## 2018-07-09 DIAGNOSIS — I50.23 ACUTE ON CHRONIC SYSTOLIC HEART FAILURE (H): ICD-10-CM

## 2018-07-09 DIAGNOSIS — I10 HYPERTENSION GOAL BP (BLOOD PRESSURE) < 140/90: ICD-10-CM

## 2018-07-09 DIAGNOSIS — E87.6 HYPOKALEMIA: ICD-10-CM

## 2018-07-09 DIAGNOSIS — N18.30 ANEMIA IN STAGE 3 CHRONIC KIDNEY DISEASE (H): ICD-10-CM

## 2018-07-09 NOTE — PROGRESS NOTES
Assessment and Plan:   Jaja Ernandez is a 78 year old female with a PMH of HTN , CKD stage 4 (baseline creat 1.6-2 with GFR in 20s in 2016) with proteinuria, HFrEF , NICM, non malignant brain tumor resection 1997 and [facility] for some uterine cyst 1990s presented with HTN emergency sec to medication non compliance in June 2018. Her creatinine was up to 3s and she had hypokalemia.    1. CKD- likely exacerbated by cardiorenal physiology on background of hypertension. Scr slightly improved, may see more improvement.  - volume seems stable   2. Blood pressure- better controlled, on diuretic and heart failure regimen- carvedilol, hydralazine and amlodipine  - possible hyperaldosteronism given low K, but may be more diuretic induced. BP now controlled, hold off on further evaluation for now.  3. Anemia- monitor post hospital, hgb 9.3, no NAKUL at this time,   - has significant iron deficiency- on ferrous sulfate, increased to bid, monitor iron levels   - cscope discussion with PCP  4. Electrolytes- hypokalemia, high renin/ aldosterone ratio- ? Hyperaldosteronism due to RAAS due to cardiorenal. K supplement to keep K at 4.0 or so  - monitor magnesium  5. Bone- check PTH, vitamin D      Assessment and plan was discussed with patient and she voiced her understanding and agreement.    Reason for Visit:  Jaja Ernandez is a 78 year old female with CKD from hypertension and cardiorenal syndrome , who presents for post hospital visit..     HPI:  She is a pleasant female with complex medical history who presents for post hospital followup.  She was seen at the [place] recently and was seen by Dr Amor (note on 7/1/18).   She presented to the hospital with leg cramps and aching, and they were tight and crampy  She had not taken her medications for 3-4 months and she was admitted with TATIANA on CKD. She was restarted on her blood pressure medications as she had significant hypertension, acute decompensated heart failure. She was noted with  moderate to severe MR, troponin leak, and severe pulmonary hypertension.   She has diuresed nicely and is closer to euvolemia. She has had low potassium and was given K supplements. She is here today with daughter. She is trying to be better, compliant with medication and low salt diet. She feels better.  She is being monitored by heart failure clinic    Home BP: Not checked    Baseline Cr: 1.6-2    ROS:  A comprehensive review of systems was obtained and negative, except as noted in the HPI or PMH.    Active Medical Problems:  Patient Active Problem List   Diagnosis     History of hysterectomy for benign disease     Hypertension goal BP (blood pressure) < 140/90     History of benign brain tumor     CARDIOVASCULAR SCREENING; LDL GOAL LESS THAN 130     Retinopathy, hypertensive, left eye     Cataracts, both eyes     Chronic systolic congestive heart failure (HCC)     CKD (chronic kidney disease) stage 3, GFR 30-59 ml/min     Anemia in stage 3 chronic kidney disease     Other nonspecific abnormal cardiovascular system function study     Status post coronary angiogram     Nonischemic cardiomyopathy (H)     Heart failure (H)       Personal Hx:   Social History     Social History     Marital status:      Spouse name: N/A     Number of children: N/A     Years of education: N/A     Occupational History     Not on file.     Social History Main Topics     Smoking status: Never Smoker     Smokeless tobacco: Never Used     Alcohol use No     Drug use: No     Sexual activity: No     Other Topics Concern     Parent/Sibling W/ Cabg, Mi Or Angioplasty Before 65f 55m? No     Social History Narrative       Allergies:  Allergies   Allergen Reactions     Demerol [Meperidine] Nausea and Vomiting       Medications:  Prior to Admission medications    Medication Sig Start Date End Date Taking? Authorizing Provider   amLODIPine (NORVASC) 10 MG tablet Take 1 tablet (10 mg) by mouth At Bedtime 7/2/18  Yes Camelia Ny MD    Ascorbic Acid (VITAMIN C PO) Take 500 mg by mouth daily    Yes Reported, Patient   aspirin 81 MG tablet Take 81 mg by mouth daily    Yes Reported, Patient   Aspirin-Salicylamide-Caffeine (BC HEADACHE POWDER PO) Take 1 powder on the tongue every 6 hours with water as needed for headache   Yes Unknown, Entered By History   atorvastatin (LIPITOR) 20 MG tablet Take 1 tablet (20 mg) by mouth daily 7/3/18  Yes Camelia Ny MD   calcium-vitamin D (CALTRATE) 600-400 MG-UNIT per tablet Take 1 tablet by mouth daily   Yes Unknown, Entered By History   carvedilol (COREG) 6.25 MG tablet Take 1 tablet (6.25 mg) by mouth 2 times daily (with meals) 7/2/18  Yes Camelia Ny MD   docusate sodium (COLACE) 100 MG capsule Take 1 capsule (100 mg) by mouth 2 times daily as needed for constipation 12/4/15  Yes Candy Patterson MD   ferrous sulfate (IRON) 325 (65 Fe) MG tablet Take 1 tablet (325 mg) by mouth 2 times daily 7/2/18  Yes Camelia Ny MD   furosemide (LASIX) 40 MG tablet Take 1 tablet (40 mg) by mouth 2 times daily 7/2/18  Yes Camelia Ny MD   garlic 150 MG TABS Take 150 mg by mouth daily   Yes Unknown, Entered By History   hydrALAZINE (APRESOLINE) 50 MG tablet Take 1 tablet (50 mg) by mouth 3 times daily 7/2/18  Yes Camelia Ny MD   isosorbide mononitrate (IMDUR) 60 MG 24 hr tablet Take 1 tablet (60 mg) by mouth 2 times daily 7/2/18  Yes Camelia Ny MD   Multiple Vitamins-Minerals (CENTRUM SILVER) per tablet Take 1 tablet by mouth daily   Yes Unknown, Entered By History   vitamin E (VITAMIN E-400) 400 UNIT capsule Take 400 Units by mouth daily    Yes Reported, Patient       Vitals:  /88 (BP Location: Right arm, Patient Position: Sitting, Cuff Size: Adult Large)  Pulse 94  Wt 84.8 kg (187 lb)  BMI 36.52 kg/m2    Exam:  GENERAL APPEARANCE: alert and no distress  HENT: mouth without ulcers or lesions  LYMPHATICS: no cervical or supraclavicular nodes  RESP: lungs  clear to auscultation - no rales, rhonchi or wheezes  CV: regular rhythm, normal rate, no rub, no murmur  EDEMA: + LE edema bilaterally  ABDOMEN: soft, nondistended, nontender, bowel sounds normal  MS: extremities normal - no gross deformities noted, no evidence of inflammation in joints, no muscle tenderness  SKIN: no rash    Results:  Last Basic Metabolic Panel:  Lab Results   Component Value Date     07/10/2018      Lab Results   Component Value Date    POTASSIUM 3.7 07/10/2018     Lab Results   Component Value Date    CHLORIDE 102 07/10/2018     Lab Results   Component Value Date    CHICHO 9.0 07/10/2018     Lab Results   Component Value Date    CO2 30 07/10/2018     Lab Results   Component Value Date    BUN 33 07/10/2018     Lab Results   Component Value Date    CR 3.14 07/10/2018     Lab Results   Component Value Date     07/10/2018

## 2018-07-09 NOTE — MR AVS SNAPSHOT
After Visit Summary   7/9/2018    Jaja Ernandez    MRN: [id number]           Patient Information     Date Of Birth          1940        Visit Information        Provider Department      7/9/2018 4:15 PM Morenita Otoole MD [facility] Renal        Today's Diagnoses     CKD (chronic kidney disease) stage 3, GFR 30-59 ml/min    -  1    Anemia in stage 3 chronic kidney disease        Acute on chronic systolic heart failure (H)        Hypertension goal BP (blood pressure) < 140/90        Pulmonary hypertension        Hypokalemia           Follow-ups after your visit        Follow-up notes from your care team     Return in about 2 months (around 9/9/2018).      Your next 10 appointments already scheduled     Jul 17, 2018 10:20 AM CDT   LAB with LAB FIRST FLOOR [facility] ([facility])    [id number] [place] [address] [id number] [phone]           Please do not eat 10-12 hours before your appointment if you are coming in fasting for labs on lipids, cholesterol, or glucose (sugar). This does not apply to pregnant women. Water, hot tea and black coffee (with nothing added) are okay. Do not drink other fluids, diet soda or chew gum.            Jul 19, 2018 11:30 AM CDT   Return Visit with Nancy Auguste PA-C   [facility] ([facility])    [address] [place] [address] [address]nd [facility] [id number] [phone]              Future tests that were ordered for you today     Open Future Orders        Priority Expected Expires Ordered    Follow-Up with [facility] Routine 7/19/2018 10/15/2018 7/10/2018            Who to contact     Please call your clinic at [phone] to:    Ask questions about your health    Make or cancel appointments    Discuss your medicines    Learn about your test results    Speak to your doctor            Additional Information About Your Visit        [employer]  Information     Edyn gives you secure access to your electronic health record. If you see a primary care provider, you can also send messages to your care team and make appointments. If you have questions, please call your primary care clinic.  If you do not have a primary care provider, please call [phone] and they will assist you.      Edyn is an electronic gateway that provides easy, online access to your medical records. With Ed[employer], you can request a clinic appointment, read your test results, renew a prescription or communicate with your care team.     To access your existing account, please contact your [facility] or call [phone] for assistance.        Care EveryWhere ID     This is your Care EveryWhere ID. This could be used by other organizations to access your [place] medical records  [id number]        Your Vitals Were     Pulse BMI (Body Mass Index)                94 36.52 kg/m2           Blood Pressure from Last 3 Encounters:   07/10/18 135/82   07/09/18 136/88   07/02/18 136/81    Weight from Last 3 Encounters:   07/10/18 84.3 kg (185 lb 14.4 oz)   07/09/18 84.8 kg (187 lb)   07/02/18 83.1 kg (183 lb 1.6 oz)              Today, you had the following     No orders found for display       Primary Care Provider Office Phone # Fax #    Edi Ribeiro [phone] [address] [facility] [address]        Equal Access to Services     NADYA JOHNSON : Hadii aad ku hadasho Soomaali, waaxda luqadaha, qaybta kaalmada adeegyada, jaren carver . So [facility] [phone].    ATENCIÓN: Si habla español, tiene a richter disposición servicios gratuitos de asistencia lingüística. Llame al [phone].    We comply with applicable federal civil rights laws and [place] laws. We do not discriminate on the basis of race, color, national origin, age, disability, sex, sexual orientation, or gender identity.            Thank you!      Thank you for choosing [facility]  for your care. Our goal is always to provide you with excellent care. Hearing back from our patients is one way we can continue to improve our services. Please take a few minutes to complete the written survey that you may receive in the mail after your visit with us. Thank you!             Your Updated Medication List - Protect others around you: Learn how to safely use, store and throw away your medicines at www.disposemymeds.org.          This list is accurate as of 7/9/18 11:59 PM.  Always use your most recent med list.                   Brand Name Dispense Instructions for use Diagnosis    amLODIPine 10 MG tablet    NORVASC    30 tablet    Take 1 tablet (10 mg) by mouth At Bedtime    Acute pulmonary edema with congestive heart failure (H)       aspirin 81 MG tablet      Take 81 mg by mouth daily        atorvastatin 20 MG tablet    LIPITOR    30 tablet    Take 1 tablet (20 mg) by mouth daily    Acute pulmonary edema with congestive heart failure (H)       BC HEADACHE POWDER PO      Take 1 powder on the tongue every 6 hours with water as needed for headache        calcium-vitamin D 600-400 MG-UNIT per tablet    CALTRATE     Take 1 tablet by mouth daily        carvedilol 6.25 MG tablet    COREG    60 tablet    Take 1 tablet (6.25 mg) by mouth 2 times daily (with meals)    Acute pulmonary edema with congestive heart failure (H)       CENTRUM SILVER per tablet      Take 1 tablet by mouth daily        docusate sodium 100 MG capsule    COLACE    60 capsule    Take 1 capsule (100 mg) by mouth 2 times daily as needed for constipation    Slow transit constipation       ferrous sulfate 325 (65 Fe) MG tablet    IRON    100 tablet    Take 1 tablet (325 mg) by mouth 2 times daily    Anemia in stage 3 chronic kidney disease       garlic 150 MG Tabs tablet      Take 150 mg by mouth daily        hydrALAZINE 50 MG tablet    APRESOLINE    60 tablet    Take 1 tablet (50 mg) by mouth 3  times daily    Acute pulmonary edema with congestive heart failure (H)       isosorbide mononitrate 60 MG 24 hr tablet    IMDUR    30 tablet    Take 1 tablet (60 mg) by mouth 2 times daily    Ischemic cardiomyopathy       VITAMIN C PO      Take 500 mg by mouth daily        vitamin E 400 UNIT capsule   Generic drug:  vitamin E      Take 400 Units by mouth daily

## 2018-07-09 NOTE — LETTER
7/9/2018      RE: Jaja Ernandez  [address]  [facility] [address]       Assessment and Plan:   Jaja Ernandez is a 78 year old female with a PMH of HTN , CKD stage 4 (baseline creat 1.6-2 with GFR in 20s in 2016) with proteinuria, HFrEF , NICM, non malignant brain tumor resection 1997 and KURT for some uterine cyst 1990s presented with HTN emergency sec to medication non compliance in June 2018. Her creatinine was up to 3s and she had hypokalemia.    1. CKD- likely exacerbated by cardiorenal physiology on background of hypertension. Scr slightly improved, may see more improvement.  - volume seems stable   2. Blood pressure- better controlled, on diuretic and heart failure regimen- carvedilol, hydralazine and amlodipine  - possible hyperaldosteronism given low K, but may be more diuretic induced. BP now controlled, hold off on further evaluation for now.  3. Anemia- monitor post hospital, hgb 9.3, no NAKUL at this time,   - has significant iron deficiency- on ferrous sulfate, increased to bid, monitor iron levels   - cscope discussion with PCP  4. Electrolytes- hypokalemia, high renin/ aldosterone ratio- ? Hyperaldosteronism due to RAAS due to cardiorenal. K supplement to keep K at 4.0 or so  - monitor magnesium  5. Bone- check PTH, vitamin D      Assessment and plan was discussed with patient and she voiced her understanding and agreement.    Reason for Visit:  Jaja Ernandez is a 78 year old female with CKD from hypertension and cardiorenal syndrome , who presents for post hospital visit..     HPI:  She is a pleasant female with complex medical history who presents for post hospital followup.  She was seen at the [facility] recently and was seen by Dr Amor (note on 7/1/18).   She presented to the hospital with leg cramps and aching, and they were tight and crampy  She had not taken her medications for 3-4 months and she was admitted with TATIANA on CKD. She was restarted on her blood pressure medications as she  had significant hypertension, acute decompensated heart failure. She was noted with moderate to severe MR, troponin leak, and severe pulmonary hypertension.   She has diuresed nicely and is closer to euvolemia. She has had low potassium and was given K supplements. She is here today with daughter. She is trying to be better, compliant with medication and low salt diet. She feels better.  She is being monitored by heart failure clinic    Home BP: Not checked    Baseline Cr: 1.6-2    ROS:  A comprehensive review of systems was obtained and negative, except as noted in the HPI or PMH.    Active Medical Problems:  Patient Active Problem List   Diagnosis     History of hysterectomy for benign disease     Hypertension goal BP (blood pressure) < 140/90     History of benign brain tumor     CARDIOVASCULAR SCREENING; LDL GOAL LESS THAN 130     Retinopathy, hypertensive, left eye     Cataracts, both eyes     Chronic systolic congestive heart failure (HCC)     CKD (chronic kidney disease) stage 3, GFR 30-59 ml/min     Anemia in stage 3 chronic kidney disease     Other nonspecific abnormal cardiovascular system function study     Status post coronary angiogram     Nonischemic cardiomyopathy (H)     Heart failure (H)       Personal Hx:   Social History     Social History     Marital status:      Spouse name: N/A     Number of children: N/A     Years of education: N/A     Occupational History     Not on file.     Social History Main Topics     Smoking status: Never Smoker     Smokeless tobacco: Never Used     Alcohol use No     Drug use: No     Sexual activity: No     Other Topics Concern     Parent/Sibling W/ Cabg, Mi Or Angioplasty Before 65f 55m? No     Social History Narrative       Allergies:  Allergies   Allergen Reactions     Demerol [Meperidine] Nausea and Vomiting       Medications:  Prior to Admission medications    Medication Sig Start Date End Date Taking? Authorizing Provider   amLODIPine (NORVASC) 10 MG  tablet Take 1 tablet (10 mg) by mouth At Bedtime 7/2/18  Yes Camelia Ny MD   Ascorbic Acid (VITAMIN C PO) Take 500 mg by mouth daily    Yes Reported, Patient   aspirin 81 MG tablet Take 81 mg by mouth daily    Yes Reported, Patient   Aspirin-Salicylamide-Caffeine (BC HEADACHE POWDER PO) Take 1 powder on the tongue every 6 hours with water as needed for headache   Yes Unknown, Entered By History   atorvastatin (LIPITOR) 20 MG tablet Take 1 tablet (20 mg) by mouth daily 7/3/18  Yes Camelia Ny MD   calcium-vitamin D (CALTRATE) 600-400 MG-UNIT per tablet Take 1 tablet by mouth daily   Yes Unknown, Entered By History   carvedilol (COREG) 6.25 MG tablet Take 1 tablet (6.25 mg) by mouth 2 times daily (with meals) 7/2/18  Yes Camelia Ny MD   docusate sodium (COLACE) 100 MG capsule Take 1 capsule (100 mg) by mouth 2 times daily as needed for constipation 12/4/15  Yes Candy Patterson MD   ferrous sulfate (IRON) 325 (65 Fe) MG tablet Take 1 tablet (325 mg) by mouth 2 times daily 7/2/18  Yes Camelia Ny MD   furosemide (LASIX) 40 MG tablet Take 1 tablet (40 mg) by mouth 2 times daily 7/2/18  Yes Camelia Ny MD   garlic 150 MG TABS Take 150 mg by mouth daily   Yes Unknown, Entered By History   hydrALAZINE (APRESOLINE) 50 MG tablet Take 1 tablet (50 mg) by mouth 3 times daily 7/2/18  Yes Camelia Ny MD   isosorbide mononitrate (IMDUR) 60 MG 24 hr tablet Take 1 tablet (60 mg) by mouth 2 times daily 7/2/18  Yes Camelia Ny MD   Multiple Vitamins-Minerals (CENTRUM SILVER) per tablet Take 1 tablet by mouth daily   Yes Unknown, Entered By History   vitamin E (VITAMIN E-400) 400 UNIT capsule Take 400 Units by mouth daily    Yes Reported, Patient       Vitals:  /88 (BP Location: Right arm, Patient Position: Sitting, Cuff Size: Adult Large)  Pulse 94  Wt 84.8 kg (187 lb)  BMI 36.52 kg/m2    Exam:  GENERAL APPEARANCE: alert and no distress  HENT: mouth  without ulcers or lesions  LYMPHATICS: no cervical or supraclavicular nodes  RESP: lungs clear to auscultation - no rales, rhonchi or wheezes  CV: regular rhythm, normal rate, no rub, no murmur  EDEMA: + LE edema bilaterally  ABDOMEN: soft, nondistended, nontender, bowel sounds normal  MS: extremities normal - no gross deformities noted, no evidence of inflammation in joints, no muscle tenderness  SKIN: no rash    Results:  Last Basic Metabolic Panel:  Lab Results   Component Value Date     07/10/2018      Lab Results   Component Value Date    POTASSIUM 3.7 07/10/2018     Lab Results   Component Value Date    CHLORIDE 102 07/10/2018     Lab Results   Component Value Date    CHICHO 9.0 07/10/2018     Lab Results   Component Value Date    CO2 30 07/10/2018     Lab Results   Component Value Date    BUN 33 07/10/2018     Lab Results   Component Value Date    CR 3.14 07/10/2018     Lab Results   Component Value Date     07/10/2018       Morenita Otoole MD

## 2018-07-10 ENCOUNTER — OFFICE VISIT (OUTPATIENT)
Dept: CARDIOLOGY | Facility: CLINIC | Age: 78
End: 2018-07-10
Attending: NURSE PRACTITIONER
Payer: MEDICARE

## 2018-07-10 VITALS
WEIGHT: 185.9 LBS | HEIGHT: 65 IN | OXYGEN SATURATION: 95 % | DIASTOLIC BLOOD PRESSURE: 82 MMHG | BODY MASS INDEX: 30.97 KG/M2 | SYSTOLIC BLOOD PRESSURE: 135 MMHG | HEART RATE: 79 BPM

## 2018-07-10 DIAGNOSIS — N18.4 CKD (CHRONIC KIDNEY DISEASE) STAGE 4, GFR 15-29 ML/MIN (H): Primary | ICD-10-CM

## 2018-07-10 DIAGNOSIS — I10 HYPERTENSION GOAL BP (BLOOD PRESSURE) < 140/90: ICD-10-CM

## 2018-07-10 DIAGNOSIS — N17.9 ACUTE KIDNEY INJURY (NONTRAUMATIC) (H): ICD-10-CM

## 2018-07-10 DIAGNOSIS — N18.4 CKD (CHRONIC KIDNEY DISEASE) STAGE 4, GFR 15-29 ML/MIN (H): ICD-10-CM

## 2018-07-10 DIAGNOSIS — I50.22 CHRONIC SYSTOLIC HEART FAILURE (H): Primary | ICD-10-CM

## 2018-07-10 DIAGNOSIS — I50.20 HFREF (HEART FAILURE WITH REDUCED EJECTION FRACTION) (H): ICD-10-CM

## 2018-07-10 DIAGNOSIS — I25.10 CORONARY ARTERY DISEASE INVOLVING NATIVE CORONARY ARTERY OF NATIVE HEART WITHOUT ANGINA PECTORIS: ICD-10-CM

## 2018-07-10 DIAGNOSIS — N18.4 CHRONIC KIDNEY DISEASE, STAGE IV (SEVERE) (H): ICD-10-CM

## 2018-07-10 DIAGNOSIS — I50.1 ACUTE PULMONARY EDEMA WITH CONGESTIVE HEART FAILURE (H): ICD-10-CM

## 2018-07-10 DIAGNOSIS — N18.30 CKD (CHRONIC KIDNEY DISEASE) STAGE 3, GFR 30-59 ML/MIN (H): ICD-10-CM

## 2018-07-10 LAB
ANION GAP SERPL CALCULATED.3IONS-SCNC: 10 MMOL/L (ref 3–14)
BUN SERPL-MCNC: 33 MG/DL (ref 7–30)
CALCIUM SERPL-MCNC: 9 MG/DL (ref 8.5–10.1)
CHLORIDE SERPL-SCNC: 102 MMOL/L (ref 94–109)
CO2 SERPL-SCNC: 30 MMOL/L (ref 20–32)
CREAT SERPL-MCNC: 3.14 MG/DL (ref 0.52–1.04)
GFR SERPL CREATININE-BSD FRML MDRD: 14 ML/MIN/1.7M2
GLUCOSE SERPL-MCNC: 114 MG/DL (ref 70–99)
HGB BLD-MCNC: 9.8 G/DL (ref 11.7–15.7)
LDH SERPL L TO P-CCNC: 273 U/L (ref 81–234)
MAGNESIUM SERPL-MCNC: 2.6 MG/DL (ref 1.6–2.3)
POTASSIUM SERPL-SCNC: 3.7 MMOL/L (ref 3.4–5.3)
PTH-INTACT SERPL-MCNC: 287 PG/ML (ref 18–80)
SODIUM SERPL-SCNC: 142 MMOL/L (ref 133–144)

## 2018-07-10 PROCEDURE — 36415 COLL VENOUS BLD VENIPUNCTURE: CPT | Performed by: NURSE PRACTITIONER

## 2018-07-10 PROCEDURE — G0463 HOSPITAL OUTPT CLINIC VISIT: HCPCS | Mod: ZF

## 2018-07-10 PROCEDURE — 80048 BASIC METABOLIC PNL TOTAL CA: CPT | Performed by: NURSE PRACTITIONER

## 2018-07-10 PROCEDURE — 83615 LACTATE (LD) (LDH) ENZYME: CPT | Performed by: NURSE PRACTITIONER

## 2018-07-10 PROCEDURE — 83970 ASSAY OF PARATHORMONE: CPT | Performed by: NURSE PRACTITIONER

## 2018-07-10 PROCEDURE — 99214 OFFICE O/P EST MOD 30 MIN: CPT | Mod: ZP | Performed by: NURSE PRACTITIONER

## 2018-07-10 PROCEDURE — 85018 HEMOGLOBIN: CPT | Performed by: NURSE PRACTITIONER

## 2018-07-10 PROCEDURE — 83735 ASSAY OF MAGNESIUM: CPT | Performed by: NURSE PRACTITIONER

## 2018-07-10 RX ORDER — FUROSEMIDE 40 MG
TABLET ORAL
Qty: 90 TABLET | Refills: 3 | Status: SHIPPED | OUTPATIENT
Start: 2018-07-10 | End: 2018-07-11

## 2018-07-10 ASSESSMENT — PAIN SCALES - GENERAL: PAINLEVEL: NO PAIN (0)

## 2018-07-10 NOTE — PROGRESS NOTES
HPI:   Ms. Ernandez is a 78 year old female with a past medical history including HFrEF, HTN, breast cancer in remission. Presents to clinic for hospital follow-up. She presented to [facility] 6/29 with HTN emergency and decompensated HF and TATIANA due to medication non-adherence (no meds in 3-4 months). An echo showed EF 20-30% (from 35% last echo) with mod-severe MR and TR and evidence of severe pulmonary hypertension. Troponin leaked and peaked at 0.124. Renal US done given TATIANA and marked HTN - results inconclusive for MANASA. She was diuresed about 15 lb and d/c'ed on lasix 40 mg bid. Weight on DC 83 kg. She did see nephrology yesterday - their note is pending.     Since hospital discharge, she is feeling okay. She is not routinely checking BP but remembers it was 141/87 yesterday. Her appetite is fair. She reports weight is up a little since hospital DC. Denies significant LE edema, no orthopnea or PND. Denies exertional chest pain, palpitations, syncope.     PAST MEDICAL HISTORY:  Past Medical History:   Diagnosis Date     Cataract      Chronic renal failure, stage 3 (moderate)      Congestive heart failure, unspecified      Hypertension      Nonischemic cardiomyopathy (H) 8/25/2015     Other nonspecific abnormal cardiovascular system function study 5/15/2015     Systolic CHF (H)     LVEF 35-40% 3/2015       FAMILY HISTORY:  Family History   Problem Relation Age of Onset     Breast Cancer Mother 50     Cancer Mother      Asthma Other      Unknown/Adopted Father      Breast Cancer Sister 70     Cancer Sister      Hypertension Daughter      Diabetes No family hx of      Cerebrovascular Disease No family hx of      Thyroid Disease No family hx of      Glaucoma No family hx of      Macular Degeneration No family hx of        SOCIAL HISTORY:  Social History     Social History     Marital status:      Spouse name: N/A     Number of children: N/A     Years of education: N/A     Social History Main Topics     Smoking status:  Never Smoker     Smokeless tobacco: Never Used     Alcohol use No     Drug use: No     Sexual activity: No     Other Topics Concern     Parent/Sibling W/ Cabg, Mi Or Angioplasty Before 65f 55m? No     Social History Narrative       CURRENT MEDICATIONS:    Current Outpatient Prescriptions on File Prior to Visit:  amLODIPine (NORVASC) 10 MG tablet Take 1 tablet (10 mg) by mouth At Bedtime   Ascorbic Acid (VITAMIN C PO) Take 500 mg by mouth daily    aspirin 81 MG tablet Take 81 mg by mouth daily    Aspirin-Salicylamide-Caffeine (BC HEADACHE POWDER PO) Take 1 powder on the tongue every 6 hours with water as needed for headache   atorvastatin (LIPITOR) 20 MG tablet Take 1 tablet (20 mg) by mouth daily   calcium-vitamin D (CALTRATE) 600-400 MG-UNIT per tablet Take 1 tablet by mouth daily   carvedilol (COREG) 6.25 MG tablet Take 1 tablet (6.25 mg) by mouth 2 times daily (with meals)   docusate sodium (COLACE) 100 MG capsule Take 1 capsule (100 mg) by mouth 2 times daily as needed for constipation   ferrous sulfate (IRON) 325 (65 Fe) MG tablet Take 1 tablet (325 mg) by mouth 2 times daily   furosemide (LASIX) 40 MG tablet Take 1 tablet (40 mg) by mouth 2 times daily   garlic 150 MG TABS Take 150 mg by mouth daily   hydrALAZINE (APRESOLINE) 50 MG tablet Take 1 tablet (50 mg) by mouth 3 times daily   isosorbide mononitrate (IMDUR) 60 MG 24 hr tablet Take 1 tablet (60 mg) by mouth 2 times daily   Multiple Vitamins-Minerals (CENTRUM SILVER) per tablet Take 1 tablet by mouth daily   vitamin E (VITAMIN E-400) 400 UNIT capsule Take 400 Units by mouth daily      No current facility-administered medications on file prior to visit.     ROS:   CONSTITUTIONAL: Denies fever, chills, fatigue, or weight fluctuations.   HEENT: Denies headache, vision changes, and changes in speech.   CV: Refer to HPI.   PULMONARY:Refer to HPI.   GI:Denies nausea, vomiting, diarrhea, and abdominal pain. Bowel movements are regular.   :Denies urinary  "alterations, dysuria, urinary frequency, hematuria, and abnormal drainage.   EXT:Denies lower extremity edema.   SKIN:Denies abnormal rashes or lesions.   MUSCULOSKELETAL:Denies upper or lower extremity weakness and pain.   NEUROLOGIC:Denies lightheadedness, dizziness, seizures, or upper or lower extremity paresthesia.     EXAM:  /82 (BP Location: Left arm, Cuff Size: Adult Large)  Pulse 79  Ht 1.651 m (5' 5\")  Wt 84.3 kg (185 lb 14.4 oz)  SpO2 95%  BMI 30.94 kg/m2     GENERAL: Appears comfortable, in no acute distress.   HEENT: Eye symmetrical, no discharge or icterus bilaterally. Mucous membranes moist and without lesions.  CV: RRR, +S1S2, no murmur, rub, or gallop. JVP 2 cm above clavicle at 60 degrees.   RESPIRATORY: Respirations regular, even, and unlabored. Lungs CTA throughout.   GI: Soft, obese and non distended with normoactive bowel sounds present in all quadrants. No tenderness, rebound, guarding. No hepatomegaly.   EXTREMITIES: Trace peripheral edema. 2+ bilateral pedal pulses.   NEUROLOGIC: Alert and oriented x 3. No focal deficits.   MUSCULOSKELETAL: No joint swelling or tenderness.   SKIN: No jaundice. No rashes or lesions.     Labs, reviewed with patient in clinic today:  CBC RESULTS:  Lab Results   Component Value Date    WBC 8.0 07/01/2018    RBC 3.22 (L) 07/01/2018    HGB 9.8 (L) 07/10/2018    HCT 29.3 (L) 07/01/2018    MCV 91 07/01/2018    MCH 28.9 07/01/2018    MCHC 31.7 07/01/2018    RDW 17.3 (H) 07/01/2018     07/02/2018       CMP RESULTS:  Lab Results   Component Value Date     07/02/2018    POTASSIUM 3.1 (L) 07/02/2018    CHLORIDE 103 07/02/2018    CO2 25 07/02/2018    ANIONGAP 12 07/02/2018     (H) 07/02/2018    BUN 29 07/02/2018    CR 3.29 (H) 07/02/2018    GFRESTIMATED 14 (L) 07/02/2018    GFRESTBLACK 16 (L) 07/02/2018    CHICHO 8.7 07/02/2018    BILITOTAL 0.7 07/01/2018    ALBUMIN 3.0 (L) 07/01/2018    ALKPHOS 128 07/01/2018     (H) 07/01/2018    AST " 103 (H) 07/01/2018        INR RESULTS:  Lab Results   Component Value Date    INR 1.27 (H) 06/29/2018       Lab Results   Component Value Date    MAG 2.1 07/02/2018     Lab Results   Component Value Date    NTBNPI 43358 (H) 06/29/2018     Lab Results   Component Value Date    NTBNP 590 (H) 05/15/2015       Diagnostics:  TTE 6/29/18  Prominent left ventricular hypertrophy with global hypokinesis and severe left  ventricular systolic dysfunction (EF 20-30%).  Moderate to severe mitral valve regurgitation with central jet.  Severe tricuspid valve regurgitation with central jet.  Severe pulmonary hypertension with dilated IVC and right ventricular systolic  dysfunction.    RHC 5/19/15      Cor angio 5/19/15        Assessment and Plan:   Ms. Ernandez is a 78 year old female with recent admission for HTN emergency and acute on chronic systolic heart failure who presents to [facility] for hospital f/u. Her BP is better controlled while taking medications but she is slightly hypervolemic so will increase lasix until at her DC weight.     # Chronic systolic heart failure/HFrEF secondary to HTN    # Moderate to severe mitral regurgitation  # Severe tricuspid regurgitation   Stage C. NYHA Class II-III.    Fluid status: hypervolemic, increase lasix to 80 mg in AM and 40 mg in PM until dry weight   ACEi/ARB/ARNI/Afterload reduction: Imdur 60 mg bid and hydralazine 50 mg tid, holding lisinopril due to TATIANA  BB: coreg 6.25 mg bid  Aldosterone antagonist: defer due to TATIANA  SCD prophylaxis: defer due to medication up-titration  NSAID use: contraindicated  Sleep apnea evaluation: discuss at future visit  Remote monitoring: referral to [facility]    # HTN with recent HTN emergency BP controlled today on above regimen. Will set up with [facility].     # Non oliguric TATIANA on CKD Cr continued to improve. She is followed by Dr Otoole.   # Non-obstructive coronary artery disease no signs of angina. She is on ASA, BB, and statin.     Follow up in two  weeks for fluid check      25 minutes spent face-to-face with patient, >50% in counseling and/or coordination of care as described above    Silvia Chavez DNP, NP-C  7/10/2018          SUSHIL NEAL

## 2018-07-10 NOTE — MR AVS SNAPSHOT
"              After Visit Summary   7/10/2018    Jaja Ernandez    MRN: [id number]           Patient Information     Date Of Birth          1940        Visit Information        Provider Department      7/10/2018 10:00 AM Kaye Chavez, APRN CNP [facility]        Today's Diagnoses     Chronic systolic heart failure (H)    -  1    Coronary artery disease involving native coronary artery of native heart without angina pectoris        Acute kidney injury (nontraumatic) (H)        CKD (chronic kidney disease) stage 3, GFR 30-59 ml/min        Hypertension goal BP (blood pressure) < 140/90        Acute pulmonary edema with congestive heart failure (H)          Care Instructions    You were seen today in the Cardiovascular Clinic at the [facility].     Cardiology Providers you saw during your visit: Silvia Chavez NP     1. Please increase your lasix to 80mg in the AM and 40mg in the afternoon until your weight is 182lbs then return to lasix 40mg twice daily.   2. Please make a follow-up CORE/heart failure appt in [place] on  with labs the day before in [place].          Please limit your fluid intake to 2 L (64 ounces) daily.  2 Liters a day = 8.5 cups, or 64 ounces.  Please limit your salt intake to 2 grams a day or less.     If you gain 2# in 24 hours or 5# in one week call Julia Reed RN so we can adjust your medications as needed over the phone.     Please feel free to call me with any questions or concerns.       Julia Reed RN BSN CHFN  [facility]  Cardiology Care Coordinator-Heart Failure Clinic     Questions and [phone].   First press #1 for the [employer] and then press #3 for \"Medical Questions\" to reach us Cardiology Nurses.      On Call Cardiologist for after hours or on weekends: [phone]   option #4 and ask to speak to the on-call Cardiologist. Inform them you are a CORE/heart failure patient at the [facility].           If " you need a medication refill please contact your pharmacy.  Please allow 3 business days for your refill to be completed.  _______________________________________________________  C.O.R.E. CLINIC Cardiomyopathy, Optimization, Rehabilitation, Education   The C.O.R.E. CLINIC is a heart failure specialty clinic within the [facility] where you will work with specialized nurse practitioners dedicated to helping patients with heart failure carefully adjust medications, receive education, and learn who and when to call if symptoms develop. They specialize in helping you better understand your condition, slow the progression of your disease, improve the length and quality of your life, help you detect future heart problems before they become life threatening, and avoid hospitalizations.  As always, thank you for trusting us with your health care needs!           Follow-ups after your visit        Additional Services     Follow-Up with CORE Clinic       Return CORE in [place] with [facility] on 7/19.  Ok to book into [facility]'s 30 minutes spots. Needs lab 1 day prior to appt.                  Your next 10 appointments already scheduled     Jul 17, 2018 10:20 AM CDT   LAB with LAB FIRST FLOOR [facility] ([facility])    [id number] [place] [address] [phone]           Please do not eat 10-12 hours before your appointment if you are coming in fasting for labs on lipids, cholesterol, or glucose (sugar). This does not apply to pregnant women. Water, hot tea and black coffee (with nothing added) are okay. Do not drink other fluids, diet soda or chew gum.            Jul 19, 2018 11:30 AM CDT   Return Visit with Nancy Auguste PA-C   [facility] ([facility])    [address]  [facility] [address] [phone]              Future tests that were ordered  "for you today     Open Future Orders        Priority Expected Expires Ordered    Follow-Up with CORE Clinic Routine 7/19/2018 10/15/2018 7/10/2018            Who to contact     If you have questions or need follow up information about today's clinic visit or your schedule please contact [facility] directly at [phone].  Normal or non-critical lab and imaging results will be communicated to you by Ioxushart, letter or phone within 4 business days after the clinic has received the results. If you do not hear from us within 7 days, please contact the clinic through [employer] or phone. If you have a critical or abnormal lab result, we will notify you by phone as soon as possible.  Submit refill requests through [employer] or call your pharmacy and they will forward the refill request to us. Please allow 3 business days for your refill to be completed.          Additional Information About Your Visit        Ioxushart Information     [employer] gives you secure access to your electronic health record. If you see a primary care provider, you can also send messages to your care team and make appointments. If you have questions, please call your primary care clinic.  If you do not have a primary care provider, please call [phone] and they will assist you.        Care EveryWhere ID     This is your Care EveryWhere ID. This could be used by other organizations to access your [place] medical records  [id number]        Your Vitals Were     Pulse Height Pulse Oximetry BMI (Body Mass Index)          79 1.651 m (5' 5\") 95% 30.94 kg/m2         Blood Pressure from Last 3 Encounters:   07/10/18 135/82   07/09/18 136/88   07/02/18 136/81    Weight from Last 3 Encounters:   07/10/18 84.3 kg (185 lb 14.4 oz)   07/09/18 84.8 kg (187 lb)   07/02/18 83.1 kg (183 lb 1.6 oz)                 Today's Medication Changes          These changes are accurate as of 7/10/18 11:19 AM.  If you have any questions, ask your nurse or doctor.    "            These medicines have changed or have updated prescriptions.        Dose/Directions    furosemide 40 MG tablet   Commonly known as:  LASIX   This may have changed:    - how much to take  - how to take this  - when to take this  - additional instructions   Used for:  Acute pulmonary edema with congestive heart failure (H)   Changed by:  Kaye Chavez APRN CNP        Take 80mg (1 tabs) in the AM and 40mg (1 tab) in the PM.   Quantity:  90 tablet   Refills:  3            Where to get your medicines      These medications were sent to [facility] [address] [place] [address] [address] [facility]  [address] [facility] [address]     Phone:  [phone]     furosemide 40 MG tablet                Primary Care Provider Office Phone # Fax #    Edi Ribeiro [phone]       [address] [facility] [address]        Equal Access to Services     [facility]: Hadii aad ku hadasho Sodontrell, waaxda luqadaha, qaybta kaalmada adejaysonyada, jaren carver . So [facility] [phone].    ATENCIÓN: Si habla español, tiene a richter disposición servicios gratuitos de asistencia lingüística. [facility] [phone].    We comply with applicable federal civil rights laws and [place] laws. We do not discriminate on the basis of race, color, national origin, age, disability, sex, sexual orientation, or gender identity.            Thank you!     Thank you for choosing [facility]  for your care. Our goal is always to provide you with excellent care. Hearing back from our patients is one way we can continue to improve our services. Please take a few minutes to complete the written survey that you may receive in the mail after your visit with us. Thank you!             Your Updated Medication List - Protect others around you: Learn how to safely use, store and throw away your medicines at www.disposemymeds.org.          This list is accurate as of 7/10/18 11:19 AM.   Always use your most recent med list.                   Brand Name Dispense Instructions for use Diagnosis    amLODIPine 10 MG tablet    NORVASC    30 tablet    Take 1 tablet (10 mg) by mouth At Bedtime    Acute pulmonary edema with congestive heart failure (H)       aspirin 81 MG tablet      Take 81 mg by mouth daily        atorvastatin 20 MG tablet    LIPITOR    30 tablet    Take 1 tablet (20 mg) by mouth daily    Acute pulmonary edema with congestive heart failure (H)       BC HEADACHE POWDER PO      Take 1 powder on the tongue every 6 hours with water as needed for headache        calcium-vitamin D 600-400 MG-UNIT per tablet    CALTRATE     Take 1 tablet by mouth daily        carvedilol 6.25 MG tablet    COREG    60 tablet    Take 1 tablet (6.25 mg) by mouth 2 times daily (with meals)    Acute pulmonary edema with congestive heart failure (H)       CENTRUM SILVER per tablet      Take 1 tablet by mouth daily        docusate sodium 100 MG capsule    COLACE    60 capsule    Take 1 capsule (100 mg) by mouth 2 times daily as needed for constipation    Slow transit constipation       ferrous sulfate 325 (65 Fe) MG tablet    IRON    100 tablet    Take 1 tablet (325 mg) by mouth 2 times daily    Anemia in stage 3 chronic kidney disease       furosemide 40 MG tablet    LASIX    90 tablet    Take 80mg (1 tabs) in the AM and 40mg (1 tab) in the PM.    Acute pulmonary edema with congestive heart failure (H)       garlic 150 MG Tabs tablet      Take 150 mg by mouth daily        hydrALAZINE 50 MG tablet    APRESOLINE    60 tablet    Take 1 tablet (50 mg) by mouth 3 times daily    Acute pulmonary edema with congestive heart failure (H)       isosorbide mononitrate 60 MG 24 hr tablet    IMDUR    30 tablet    Take 1 tablet (60 mg) by mouth 2 times daily    Ischemic cardiomyopathy       VITAMIN C PO      Take 500 mg by mouth daily        vitamin E 400 UNIT capsule   Generic drug:  vitamin E      Take 400 Units by mouth daily

## 2018-07-10 NOTE — LETTER
7/10/2018      RE: Jaja Ernandez  [address]  [place] [address]       Dear Colleague,    Thank you for the opportunity to participate in the care of your patient, Jaja Ernandez, at the [facility]. Please see a copy of my visit note below.    HPI:   Ms. Ernandez is a 78 year old female with a past medical history including HFrEF, HTN, breast cancer in remission. Presents to clinic for hospital follow-up. She presented to [facility] 6/29 with HTN emergency and decompensated HF and TATIANA due to medication non-adherence (no meds in 3-4 months). An echo showed EF 20-30% (from 35% last echo) with mod-severe MR and TR and evidence of severe pulmonary hypertension. Troponin leaked and peaked at 0.124. Renal US done given TATIANA and marked HTN - results inconclusive for MANASA. She was diuresed about 15 lb and d/c'ed on lasix 40 mg bid. Weight on DC 83 kg. She did see nephrology yesterday - their note is pending.     Since hospital discharge, she is feeling okay. She is not routinely checking BP but remembers it was 141/87 yesterday. Her appetite is fair. She reports weight is up a little since hospital DC. Denies significant LE edema, no orthopnea or PND. Denies exertional chest pain, palpitations, syncope.     PAST MEDICAL HISTORY:  Past Medical History:   Diagnosis Date     Cataract      Chronic renal failure, stage 3 (moderate)      Congestive heart failure, unspecified      Hypertension      Nonischemic cardiomyopathy (H) 8/25/2015     Other nonspecific abnormal cardiovascular system function study 5/15/2015     Systolic CHF (H)     LVEF 35-40% 3/2015       FAMILY HISTORY:  Family History   Problem Relation Age of Onset     Breast Cancer Mother 50     Cancer Mother      Asthma Other      Unknown/Adopted Father      Breast Cancer Sister 70     Cancer Sister      Hypertension Daughter      Diabetes No family hx of      Cerebrovascular Disease No family hx of       Thyroid Disease No family hx of      Glaucoma No family hx of      Macular Degeneration No family hx of        SOCIAL HISTORY:  Social History     Social History     Marital status:      Spouse name: N/A     Number of children: N/A     Years of education: N/A     Social History Main Topics     Smoking status: Never Smoker     Smokeless tobacco: Never Used     Alcohol use No     Drug use: No     Sexual activity: No     Other Topics Concern     Parent/Sibling W/ Cabg, Mi Or Angioplasty Before 65f 55m? No     Social History Narrative       CURRENT MEDICATIONS:    Current Outpatient Prescriptions on File Prior to Visit:  amLODIPine (NORVASC) 10 MG tablet Take 1 tablet (10 mg) by mouth At Bedtime   Ascorbic Acid (VITAMIN C PO) Take 500 mg by mouth daily    aspirin 81 MG tablet Take 81 mg by mouth daily    Aspirin-Salicylamide-Caffeine (BC HEADACHE POWDER PO) Take 1 powder on the tongue every 6 hours with water as needed for headache   atorvastatin (LIPITOR) 20 MG tablet Take 1 tablet (20 mg) by mouth daily   calcium-vitamin D (CALTRATE) 600-400 MG-UNIT per tablet Take 1 tablet by mouth daily   carvedilol (COREG) 6.25 MG tablet Take 1 tablet (6.25 mg) by mouth 2 times daily (with meals)   docusate sodium (COLACE) 100 MG capsule Take 1 capsule (100 mg) by mouth 2 times daily as needed for constipation   ferrous sulfate (IRON) 325 (65 Fe) MG tablet Take 1 tablet (325 mg) by mouth 2 times daily   furosemide (LASIX) 40 MG tablet Take 1 tablet (40 mg) by mouth 2 times daily   garlic 150 MG TABS Take 150 mg by mouth daily   hydrALAZINE (APRESOLINE) 50 MG tablet Take 1 tablet (50 mg) by mouth 3 times daily   isosorbide mononitrate (IMDUR) 60 MG 24 hr tablet Take 1 tablet (60 mg) by mouth 2 times daily   Multiple Vitamins-Minerals (CENTRUM SILVER) per tablet Take 1 tablet by mouth daily   vitamin E (VITAMIN E-400) 400 UNIT capsule Take 400 Units by mouth daily      No current facility-administered medications on file prior  "to visit.     ROS:   CONSTITUTIONAL: Denies fever, chills, fatigue, or weight fluctuations.   HEENT: Denies headache, vision changes, and changes in speech.   CV: Refer to HPI.   PULMONARY:Refer to HPI.   GI:Denies nausea, vomiting, diarrhea, and abdominal pain. Bowel movements are regular.   :Denies urinary alterations, dysuria, urinary frequency, hematuria, and abnormal drainage.   EXT:Denies lower extremity edema.   SKIN:Denies abnormal rashes or lesions.   MUSCULOSKELETAL:Denies upper or lower extremity weakness and pain.   NEUROLOGIC:Denies lightheadedness, dizziness, seizures, or upper or lower extremity paresthesia.     EXAM:  /82 (BP Location: Left arm, Cuff Size: Adult Large)  Pulse 79  Ht 1.651 m (5' 5\")  Wt 84.3 kg (185 lb 14.4 oz)  SpO2 95%  BMI 30.94 kg/m2     GENERAL: Appears comfortable, in no acute distress.   HEENT: Eye symmetrical, no discharge or icterus bilaterally. Mucous membranes moist and without lesions.  CV: RRR, +S1S2, no murmur, rub, or gallop. JVP 2 cm above clavicle at 60 degrees.   RESPIRATORY: Respirations regular, even, and unlabored. Lungs CTA throughout.   GI: Soft, obese and non distended with normoactive bowel sounds present in all quadrants. No tenderness, rebound, guarding. No hepatomegaly.   EXTREMITIES: Trace peripheral edema. 2+ bilateral pedal pulses.   NEUROLOGIC: Alert and oriented x 3. No focal deficits.   MUSCULOSKELETAL: No joint swelling or tenderness.   SKIN: No jaundice. No rashes or lesions.     Labs, reviewed with patient in clinic today:  CBC RESULTS:  Lab Results   Component Value Date    WBC 8.0 07/01/2018    RBC 3.22 (L) 07/01/2018    HGB 9.8 (L) 07/10/2018    HCT 29.3 (L) 07/01/2018    MCV 91 07/01/2018    MCH 28.9 07/01/2018    MCHC 31.7 07/01/2018    RDW 17.3 (H) 07/01/2018     07/02/2018       CMP RESULTS:  Lab Results   Component Value Date     07/02/2018    POTASSIUM 3.1 (L) 07/02/2018    CHLORIDE 103 07/02/2018    CO2 25 " 07/02/2018    ANIONGAP 12 07/02/2018     (H) 07/02/2018    BUN 29 07/02/2018    CR 3.29 (H) 07/02/2018    GFRESTIMATED 14 (L) 07/02/2018    GFRESTBLACK 16 (L) 07/02/2018    CHICHO 8.7 07/02/2018    BILITOTAL 0.7 07/01/2018    ALBUMIN 3.0 (L) 07/01/2018    ALKPHOS 128 07/01/2018     (H) 07/01/2018     (H) 07/01/2018        INR RESULTS:  Lab Results   Component Value Date    INR 1.27 (H) 06/29/2018       Lab Results   Component Value Date    MAG 2.1 07/02/2018     Lab Results   Component Value Date    NTBNPI 49271 (H) 06/29/2018     Lab Results   Component Value Date    NTBNP 590 (H) 05/15/2015       Diagnostics:  TTE 6/29/18  Prominent left ventricular hypertrophy with global hypokinesis and severe left  ventricular systolic dysfunction (EF 20-30%).  Moderate to severe mitral valve regurgitation with central jet.  Severe tricuspid valve regurgitation with central jet.  Severe pulmonary hypertension with dilated IVC and right ventricular systolic  dysfunction.    RHC 5/19/15      Cor angio 5/19/15        Assessment and Plan:   Ms. Ernandez is a 78 year old female with recent admission for HTN emergency and acute on chronic systolic heart failure who [place] [facility] [place]. Her BP is better controlled while taking medications but she is slightly hypervolemic so will increase lasix until at her DC weight.     # Chronic systolic heart failure/HFrEF secondary to HTN    # Moderate to severe mitral regurgitation  # Severe tricuspid regurgitation   Stage C. NYHA Class II-III.    Fluid status: hypervolemic, increase lasix to 80 mg in AM and 40 mg in PM until dry weight   ACEi/ARB/ARNI/Afterload reduction: Imdur 60 mg bid and hydralazine 50 mg tid, holding lisinopril due to TATIANA  BB: coreg 6.25 mg bid  Aldosterone antagonist: defer due to TATIANA  SCD prophylaxis: defer due to medication up-titration  NSAID use: contraindicated  Sleep apnea evaluation: discuss at future visit  Remote monitoring: referral  to [facility]    # HTN with recent HTN emergency BP controlled today on above regimen. Will set up with [facility].     # Non oliguric TATIANA on CKD Cr continued to improve. She is followed by Dr Otoole.   # Non-obstructive coronary artery disease no signs of angina. She is on ASA, BB, and statin.     Follow up in two weeks for fluid check      25 minutes spent face-to-face with patient, >50% in counseling and/or coordination of care as described above    Silvia Chavez DNP, NP-C  7/10/2018          SUSHIL NEAL    Please do not hesitate to contact me if you have any questions/concerns.     Sincerely,     KIKE Mcguire CNP

## 2018-07-10 NOTE — PATIENT INSTRUCTIONS
"You were seen today in the Cardiovascular Clinic at the [facility].     Cardiology Providers you saw during your visit: Silvia Chavez NP     1. Please increase your lasix to 80mg in the AM and 40mg in the afternoon until your weight is 182lbs then return to lasix 40mg twice daily.   2. Please make a follow-up CORE/heart failure appt in [place] on  with labs the day before in [place].          Please limit your fluid intake to 2 L (64 ounces) daily.  2 Liters a day = 8.5 cups, or 64 ounces.  Please limit your salt intake to 2 grams a day or less.     If you gain 2# in 24 hours or 5# in one week call Julia Reed RN so we can adjust your medications as needed over the phone.     Please feel free to call me with any questions or concerns.       Julia Reed RN BSN CHFN  [facility]  Cardiology Care Coordinator-Heart Failure Clinic     Questions and [phone].   First press #1 for the [employer] and then press #3 for \"Medical Questions\" to reach us Cardiology Nurses.      On Call Cardiologist for after hours or on weekends: [phone]   option #4 and ask to speak to the on-call Cardiologist. Inform them you are a CORE/heart failure patient at the [facility].           If you need a medication refill please contact your pharmacy.  Please allow 3 business days for your refill to be completed.  _______________________________________________________  C.O.R.E. CLINIC Cardiomyopathy, Optimization, Rehabilitation, Education   The C.O.R.E. CLINIC is a heart failure specialty clinic within the [facility] where you will work with specialized nurse practitioners dedicated to helping patients with heart failure carefully adjust medications, receive education, and learn who and when to call if symptoms develop. They specialize in helping you better understand your condition, slow the progression of your disease, improve the length " and quality of your life, help you detect future heart problems before they become life threatening, and avoid hospitalizations.  As always, thank you for trusting us with your health care needs!

## 2018-07-10 NOTE — NURSING NOTE
Chief Complaint   Patient presents with     Follow Up For     Return CORE: Systolic Heart Failure, ICM EF 35%, HTN, CKD st 3     Vitals were taken and medications were reconciled.     KAREEN Damian  10:23 AM

## 2018-07-11 DIAGNOSIS — I50.1 ACUTE PULMONARY EDEMA WITH CONGESTIVE HEART FAILURE (H): ICD-10-CM

## 2018-07-11 RX ORDER — FUROSEMIDE 40 MG
TABLET ORAL
Qty: 90 TABLET | Refills: 3 | Status: SHIPPED | OUTPATIENT
Start: 2018-07-11 | End: 2018-07-12

## 2018-07-12 DIAGNOSIS — I50.1 ACUTE PULMONARY EDEMA WITH CONGESTIVE HEART FAILURE (H): ICD-10-CM

## 2018-07-12 RX ORDER — FUROSEMIDE 40 MG
TABLET ORAL
Qty: 90 TABLET | Refills: 3 | Status: SHIPPED | OUTPATIENT
Start: 2018-07-12 | End: 2018-07-31

## 2018-07-13 ENCOUNTER — DOCUMENTATION ONLY (OUTPATIENT)
Dept: LAB | Facility: CLINIC | Age: 78
End: 2018-07-13

## 2018-07-13 NOTE — PROGRESS NOTES
Pt made appointment for 7-17 at [facility] for follow up labs for an appointment with you on 7-19. Please order labs.    Thank you,  Shawna STOKEST

## 2018-07-17 ENCOUNTER — PRE VISIT (OUTPATIENT)
Dept: CARDIOLOGY | Facility: CLINIC | Age: 78
End: 2018-07-17

## 2018-07-17 DIAGNOSIS — I50.20 HFREF (HEART FAILURE WITH REDUCED EJECTION FRACTION) (H): Primary | ICD-10-CM

## 2018-07-17 DIAGNOSIS — I50.20 HFREF (HEART FAILURE WITH REDUCED EJECTION FRACTION) (H): ICD-10-CM

## 2018-07-17 LAB
ANION GAP SERPL CALCULATED.3IONS-SCNC: 8 MMOL/L (ref 3–14)
BUN SERPL-MCNC: 32 MG/DL (ref 7–30)
CALCIUM SERPL-MCNC: 9.1 MG/DL (ref 8.5–10.1)
CHLORIDE SERPL-SCNC: 105 MMOL/L (ref 94–109)
CO2 SERPL-SCNC: 30 MMOL/L (ref 20–32)
CREAT SERPL-MCNC: 2.98 MG/DL (ref 0.52–1.04)
GFR SERPL CREATININE-BSD FRML MDRD: 15 ML/MIN/1.7M2
GLUCOSE SERPL-MCNC: 143 MG/DL (ref 70–99)
POTASSIUM SERPL-SCNC: 3.3 MMOL/L (ref 3.4–5.3)
SODIUM SERPL-SCNC: 143 MMOL/L (ref 133–144)

## 2018-07-17 PROCEDURE — 80048 BASIC METABOLIC PNL TOTAL CA: CPT | Performed by: PHYSICIAN ASSISTANT

## 2018-07-17 PROCEDURE — 36415 COLL VENOUS BLD VENIPUNCTURE: CPT | Performed by: PHYSICIAN ASSISTANT

## 2018-07-19 ENCOUNTER — OFFICE VISIT (OUTPATIENT)
Dept: CARDIOLOGY | Facility: CLINIC | Age: 78
End: 2018-07-19
Payer: MEDICARE

## 2018-07-19 VITALS
WEIGHT: 185 LBS | HEART RATE: 72 BPM | OXYGEN SATURATION: 97 % | SYSTOLIC BLOOD PRESSURE: 103 MMHG | BODY MASS INDEX: 30.79 KG/M2 | DIASTOLIC BLOOD PRESSURE: 68 MMHG

## 2018-07-19 DIAGNOSIS — I50.1 ACUTE PULMONARY EDEMA WITH CONGESTIVE HEART FAILURE (H): ICD-10-CM

## 2018-07-19 DIAGNOSIS — I25.5 ISCHEMIC CARDIOMYOPATHY: ICD-10-CM

## 2018-07-19 DIAGNOSIS — I50.22 CHRONIC SYSTOLIC HEART FAILURE (H): ICD-10-CM

## 2018-07-19 PROCEDURE — 99213 OFFICE O/P EST LOW 20 MIN: CPT | Performed by: PHYSICIAN ASSISTANT

## 2018-07-19 RX ORDER — POTASSIUM CHLORIDE 1500 MG/1
20 TABLET, EXTENDED RELEASE ORAL 2 TIMES DAILY
Qty: 60 TABLET | Refills: 3 | Status: SHIPPED | OUTPATIENT
Start: 2018-07-19 | End: 2018-11-01

## 2018-07-19 RX ORDER — HYDRALAZINE HYDROCHLORIDE 50 MG/1
50 TABLET, FILM COATED ORAL 3 TIMES DAILY
Qty: 270 TABLET | Refills: 3 | Status: SHIPPED | OUTPATIENT
Start: 2018-07-19 | End: 2019-04-19

## 2018-07-19 RX ORDER — ISOSORBIDE MONONITRATE 60 MG/1
60 TABLET, EXTENDED RELEASE ORAL 2 TIMES DAILY
Qty: 180 TABLET | Refills: 3 | Status: SHIPPED | OUTPATIENT
Start: 2018-07-19 | End: 2019-07-16

## 2018-07-19 NOTE — LETTER
7/19/2018      RE: Jaja Ernandez  [address]  [facility] [address]       Dear Colleague,    Thank you for the opportunity to participate in the care of your patient, Jaja Ernandez, at the [facility] PHYSICIANS HEART AT [facility] at [facility]. Please see a copy of my visit note below.    CARDIOLOGY CLINIC  Jaja Ernandez is 78 year old female with chronic systolic heart failure, breast cancer in remission and hypertension who [place] [facility] [place].     She was seen by Ms. Chavez just over a week ago for hospital follow up. She had presented to [facility] on 6/29 with hypertensive emergency, acute kidney injury and decompensated heart failure due to medication non-adherence for 4 months. Echo showed EF 20-30% and moderate to severe MR and TR and severe pulmonary hypertension. She was diuresed down 15 pounds and discharged on lasix 40mg twice daily. At that appointment they increased her Lasix to 80mg in the morning and 40mg in the evening until weight dropped to 182lbs.     Today she says she's doing much better. Last time she was really tired due to the medications but that has improved. She is also sleeping much better. Her breathing is good, she is able to walk from her apartment to the mailbox without issue. No chest pain, palpitations or lightheadedness. Her bloating is much improved and no more phlegm. She hasn't received her CardioCom but has been doing her blood pressures about every other day- yesterday evening it was 140/80 but that's the highest reading she has had. Typically closer to 110s/70s. Her weight has been bouncing between 182-184lbs. It doesn't seem she increased her furosemide to 80mg in the AM as directed, she seems confused about which medication to increase. She is otherwise taking her medications as the bottles state. She needs refills on hydralazine and isosorbide.     She has never been evaluated for sleep apnea. She  doesn't think she snores but daughter says yes. Denies daytime somnolence now. She has had previous coronary angiogram.    She knows not to drink >2L per day and has no issues with hi. She knows the importance of taking her medications know and is determined to get her heart function improved again.      PAST MEDICAL HISTORY:  Hypertension  CKD stage III  Cardiomyopathy- EF in 2015 35%, recovered to 49% as of 6/2015, back now to 20-30%    FAMILY HISTORY:  Family History   Problem Relation Age of Onset     Breast Cancer Mother 50     Cancer Mother      Asthma Other      Unknown/Adopted Father      Breast Cancer Sister 70     Cancer Sister      Hypertension Daughter      Diabetes No family hx of      Cerebrovascular Disease No family hx of      Thyroid Disease No family hx of      Glaucoma No family hx of      Macular Degeneration No family hx of        SOCIAL HISTORY: , lives alone in apartment, oldest daughter here with her today drives her to appointments. Never a smoker    CURRENT MEDICATIONS:  Outpatient Medications Prior to Visit   Medication Sig Dispense Refill     amLODIPine (NORVASC) 10 MG tablet Take 1 tablet (10 mg) by mouth At Bedtime 30 tablet 0     Ascorbic Acid (VITAMIN C PO) Take 500 mg by mouth daily        aspirin 81 MG tablet Take 81 mg by mouth daily        Aspirin-Salicylamide-Caffeine (BC HEADACHE POWDER PO) Take 1 powder on the tongue every 6 hours with water as needed for headache       atorvastatin (LIPITOR) 20 MG tablet Take 1 tablet (20 mg) by mouth daily 30 tablet 0     calcium-vitamin D (CALTRATE) 600-400 MG-UNIT per tablet Take 1 tablet by mouth daily       carvedilol (COREG) 6.25 MG tablet Take 1 tablet (6.25 mg) by mouth 2 times daily (with meals) 60 tablet 0     docusate sodium (COLACE) 100 MG capsule Take 1 capsule (100 mg) by mouth 2 times daily as needed for constipation 60 capsule 0     ferrous sulfate (IRON) 325 (65 Fe) MG tablet Take 1 tablet (325 mg) by mouth 2 times  daily 100 tablet 0     furosemide (LASIX) 40 MG tablet Take 80mg (2 tabs) in the AM and 40mg (1 tab) in the PM. 90 tablet 3     garlic 150 MG TABS Take 150 mg by mouth daily       hydrALAZINE (APRESOLINE) 50 MG tablet Take 1 tablet (50 mg) by mouth 3 times daily 60 tablet 0     isosorbide mononitrate (IMDUR) 60 MG 24 hr tablet Take 1 tablet (60 mg) by mouth 2 times daily 30 tablet 0     Multiple Vitamins-Minerals (CENTRUM SILVER) per tablet Take 1 tablet by mouth daily       vitamin E (VITAMIN E-400) 400 UNIT capsule Take 400 Units by mouth daily        No facility-administered medications prior to visit.        ROS:  Constitutional: no fever, chills, or diaphoresis.  Fatigue improved   ENT: no vision changes, epistaxis, vertigo,   Respiratory: No cough, hemoptysis, Dyspnea  Cardiovascular: As per HPI.   GI: no nausea, bloating, vomiting.   : +urinary response to diuretic, no nocturia or dysuria  Integument: Negative for bruising, rash, or pruritis.  Psychiatric: Negative for anxiety, depression, sleep disturbance, or mood changes.   Neuro: headaches resolved, no syncope or numbness   Endocrinology: Negative for thyroid disorder, night sweats, diabetes.   Musculoskeletal: Negative for gout, joint stiffness/ swelling, or muscle weakness    EXAM:  /68 (BP Location: Right arm, Patient Position: Sitting, Cuff Size: Adult Large)  Pulse 72  Wt 83.9 kg (185 lb)  SpO2 97%  BMI 30.79 kg/m2  General: seated in chair, in NAD  HEENT: NC/AT, sclera anicteric, MMM  Pulm: CTA B, no wheezing or rales noted.  Card: RRR, 2/6 systolic murmur noted, no gallop appreciated. JVP difficult to appreciate  ABd: protuberant, +BS, soft, NT  Ext: no significant edema, calves soft and equal in size bilateral  Neuro: alert, conversant, moving all extremities  Psych: pleasant mood and affect  Vasc: no carotid bruit, 2+ radial and DP bilateral    Labs:  CMP RESULTS:  Lab Results   Component Value Date     07/17/2018    POTASSIUM  3.3 (L) 07/17/2018    CHLORIDE 105 07/17/2018    CO2 30 07/17/2018    ANIONGAP 8 07/17/2018     (H) 07/17/2018    BUN 32 (H) 07/17/2018    CR 2.98 (H) 07/17/2018    GFRESTIMATED 15 (L) 07/17/2018    GFRESTBLACK 18 (L) 07/17/2018    CHICHO 9.1 07/17/2018    BILITOTAL 0.7 07/01/2018    ALBUMIN 3.0 (L) 07/01/2018    ALKPHOS 128 07/01/2018     (H) 07/01/2018     (H) 07/01/2018      Lab Results   Component Value Date    MAG 2.6 (H) 07/10/2018     CARDIAC STUDIES:  Echocardiogram 6/29/2018: Prominent LVH with global hypokinesis and EF 20-30%, traced at 24%. Severe MR and TR     Assessment and Plan:   .Jaja Ernandez is a 78 year old female with systolic heart failure with here for follow up. She is doing much better and seems committed to being compliant.     1. Chronic systolic heart failure secondary to hypertensive cardiomyopathy.    Stage C  NYHA Class III  Fluid status- improving hypervolemia, continue on lasix 80mg in AM and 40mg in PM. Needs potassium supplementation  ACEi/ARB-held for recent TATIANA, on hydralazine 50mg TID and Imdur 60mg twice daily for afterload  BB yes- carvedilol 6.25mg twice daily  Aldosterone antagonist deferred secondary to recent TATIANA  SCD prophylaxis decision deferred during medication uptitration, risk of SCD discussed with patient   Ischemia evaluation: Angiogram in 5/2015- diffuse mild disease without obstruction  NSAID use: avoid  Sleep Apnea Evaluation: never evaluated, we discussed reasoning, she will think about and get back to us  Remote monitoring: Will look into why she hasn't received CardioCom yet      Follow-up: BMP in 1 week  Should establish primary cardiologist again- willing to see Dr. Pepe in [place]- will arrange for 6-8 weeks from now. Can follow up with CORE here every 3-6 months or as needed.       Nancy Auguste PA-C  [facility]  Pager [phone]

## 2018-07-19 NOTE — PATIENT INSTRUCTIONS
"Thank you for seeing Celestina Auguste PA-C at the [facility] [place];  please note the following instructions:    1. Start potassium 20 MEQ twice a day.   2. We will check on status of cardiocom (scale and BP cuff).   3. Take lasix 80 mg in the am and 40 mg in the pm.   4. Follow up with Dr. Pepe in 1 month.   5. Please call us with any questions or concerns.   6. Follow up labs to be drawn in 1 week from today.     _______________________________________________________________________    Please limit your fluid intake to 2 L (64 ounces) daily.    2 Liters a day = 8.5 cups, or 72 ounces.  Please limit your salt intake to 2 grams a day or less.    If you gain 2# in 24 hours or 5# in one week call Valeria Cristobal RN so we can adjust your medications as needed over the phone.      Please feel free to call me with any questions or concerns.    Valeria Cristobal RN BSN   [facility]  Cardiology Care Coordinator-Heart Failure Clinic    *Questions and [phone].   First press #1 for the University and then press #3 for \"Medical Questions\" to reach us Cardiology Nurses.     *On Call Cardiologist for after hours or on weekends: [phone]   option #4 and ask to speak to the on-call Cardiologist. Inform them you are a CORE/heart failure patient at the [place].    *If you need a medication refill, please contact your pharmacy.  Please allow 3 business days for your refill to be completed.  _______________________________________________________  C.O.R.E. CLINIC Cardiomyopathy, Optimization, Rehabilitation, Education   The C.O.R.E. CLINIC is a heart failure specialty clinic within the [facility] where you will work with specialized nurse practitioners dedicated to helping patients with heart failure carefully adjust medications, receive education, and learn who and when to call if symptoms develop. They specialize in " helping you better understand your condition, slow the progression of your disease, improve the length and quality of your life, help you detect future heart problems before they become life threatening, and avoid hospitalizations.  As always, thank you for trusting us with your health care needs!

## 2018-07-19 NOTE — NURSING NOTE
"Chief Complaint   Patient presents with     Heart Failure     HFrEF (heart failure with reduced ejection fraction) (H) .Patient stated no new concerns       Initial /68 (BP Location: Right arm, Patient Position: Sitting, Cuff Size: Adult Large)  Pulse 72  Wt 83.9 kg (185 lb)  SpO2 97%  BMI 30.79 kg/m2 Estimated body mass index is 30.79 kg/(m^2) as calculated from the following:    Height as of 7/10/18: 1.651 m (5' 5\").    Weight as of this encounter: 83.9 kg (185 lb)..  BP completed using cuff size: large    Jackeline Ferrara L.P.N.    "

## 2018-07-19 NOTE — MR AVS SNAPSHOT
"              After Visit Summary   2018    Jaja Ernandez    MRN: [id number]           Patient Information     Date Of Birth          1940        Visit Information        Provider Department      2018 11:30 AM Nancy Auguste PA-C [facility] PHYSICIANS HEART AT [facility]        Today's Diagnoses     Chronic systolic heart failure (H)        Acute pulmonary edema with congestive heart failure (H)        Ischemic cardiomyopathy          Care Instructions    Thank you for seeing Celestina Auguste PA-C at the [facility] @ [facility];  please note the following instructions:    1. Start potassium 20 MEQ twice a day.   2. We will check on status of cardiocom (scale and BP cuff).   3. Take lasix 80 mg in the am and 40 mg in the pm.   4. Follow up with Dr. Pepe in 1 month.   5. Please call us with any questions or concerns.   6. Follow up labs to be drawn in 1 week from today.     _______________________________________________________________________    Please limit your fluid intake to 2 L (64 ounces) daily.    2 Liters a day = 8.5 cups, or 72 ounces.  Please limit your salt intake to 2 grams a day or less.    If you gain 2# in 24 hours or 5# in one week call Valeria Cristobal RN so we can adjust your medications as needed over the phone.      Please feel free to call me with any questions or concerns.    Valeria Cristobal RN BSN   [facility]  Cardiology Care Coordinator-Heart Failure Clinic    *Questions and [phone].   First press #1 for the University and then press #3 for \"Medical Questions\" to reach us Cardiology Nurses.     *On Call Cardiologist for after hours or on weekends: [phone]   option #4 and ask to speak to the on-call Cardiologist. Inform them you are a CORE/heart failure patient at the [place].    *If you need a medication refill, please contact your pharmacy.  Please allow 3 business days for your " refill to be completed.  _______________________________________________________  C.O.R.E. CLINIC Cardiomyopathy, Optimization, Rehabilitation, Education   The C.O.R.E. CLINIC is a heart failure specialty clinic within the [facility] where you will work with specialized nurse practitioners dedicated to helping patients with heart failure carefully adjust medications, receive education, and learn who and when to call if symptoms develop. They specialize in helping you better understand your condition, slow the progression of your disease, improve the length and quality of your life, help you detect future heart problems before they become life threatening, and avoid hospitalizations.  As always, thank you for trusting us with your health care needs!              Follow-ups after your visit        Additional Services     Follow-Up with Cardiologist       Alicia Pepe                  Your next 10 appointments already scheduled     Jul 27, 2018 10:15 AM CDT   LAB with  LAB   [facility] ([address] [address]   [phone]           Please do not eat 10-12 hours before your appointment if you are coming in fasting for labs on lipids, cholesterol, or glucose (sugar). This does not apply to pregnant women. Water, hot tea and black coffee (with nothing added) are okay. Do not drink other fluids, diet soda or chew gum.            Aug 30, 2018 12:30 PM CDT   Return Visit with Darrel Pepe MD   [facility] ([facility]    [address] [place] [id number] [phone]              Future tests that were ordered for you today     Open Future Orders        Priority Expected Expires Ordered    Basic metabolic panel Routine 7/25/2018 10/24/2018 7/19/2018    Follow-Up with Cardiologist Routine 7/26/2018 10/24/2018 7/19/2018            Who to contact      If you have questions or need follow up information about today's clinic visit or your schedule please contact [facility] PHYSICIANS HEART AT [facility] directly at [phone].  Normal or non-critical lab and imaging results will be communicated to you by [employer], letter or phone within 4 business days after the clinic has received the results. If you do not hear from us within 7 days, please contact the clinic through [employer] or phone. If you have a critical or abnormal lab result, we will notify you by phone as soon as possible.  Submit refill requests through [employer] or call your pharmacy and they will forward the refill request to us. Please allow 3 business days for your refill to be completed.          Additional Information About Your Visit        [employer] Information     [employer] gives you secure access to your electronic health record. If you see a primary care provider, you can also send messages to your care team and make appointments. If you have questions, please call your primary care clinic.  If you do not have a primary care provider, please call [phone] and they will assist you.        Care EveryWhere ID     This is your Care EveryWhere ID. This could be used by other organizations to access your [place] medical records  [id number]        Your Vitals Were     Pulse Pulse Oximetry BMI (Body Mass Index)             72 97% 30.79 kg/m2          Blood Pressure from Last 3 Encounters:   07/19/18 103/68   07/10/18 135/82   07/09/18 136/88    Weight from Last 3 Encounters:   07/19/18 83.9 kg (185 lb)   07/10/18 84.3 kg (185 lb 14.4 oz)   07/09/18 84.8 kg (187 lb)              We Performed the Following     Follow-Up with [facility]          Today's Medication Changes          These changes are accurate as of 7/19/18 12:58 PM.  If you have any questions, ask your nurse or doctor.               Start taking these medicines.        Dose/Directions    potassium chloride SA 20 MEQ CR  tablet   Commonly known as:  K-DUR/KLOR-CON M   Used for:  Chronic systolic heart failure (H)   Started by:  Nancy Auguste PA-C        Dose:  20 mEq   Take 1 tablet (20 mEq) by mouth 2 times daily   Quantity:  60 tablet   Refills:  3            Where to get your medicines      These medications were sent to [facility] [address] [place] [address] [address] [facility]  [address] [facility] [address]     Phone:  [phone]     hydrALAZINE 50 MG tablet    isosorbide mononitrate 60 MG 24 hr tablet    potassium chloride SA 20 MEQ CR tablet                Primary Care Provider Office Phone # Fax #    Edi Ribeiro [phone]       [address] [facility] [address]        Equal Access to Services     NAYDA JOHNSON : Hadii aad ku hadasho Soomaali, waaxda luqadaha, qaybta kaalmada adeegyada, jaren carver . So [facility] [phone].    ATENCIÓN: Si habla español, tiene a richter disposición servicios gratuitos de asistencia lingüística. [facility] [phone].    We comply with applicable federal civil rights laws and [place] laws. We do not discriminate on the basis of race, color, national origin, age, disability, sex, sexual orientation, or gender identity.            Thank you!     Thank you for choosing [facility] HEART AT [facility]  for your care. Our goal is always to provide you with excellent care. Hearing back from our patients is one way we can continue to improve our services. Please take a few minutes to complete the written survey that you may receive in the mail after your visit with us. Thank you!             Your Updated Medication List - Protect others around you: Learn how to safely use, store and throw away your medicines at www.disposemymeds.org.          This list is accurate as of 7/19/18 12:58 PM.  Always use your most recent med list.                   Brand Name Dispense Instructions for use Diagnosis     amLODIPine 10 MG tablet    NORVASC    30 tablet    Take 1 tablet (10 mg) by mouth At Bedtime    Acute pulmonary edema with congestive heart failure (H)       aspirin 81 MG tablet      Take 81 mg by mouth daily        atorvastatin 20 MG tablet    LIPITOR    30 tablet    Take 1 tablet (20 mg) by mouth daily    Acute pulmonary edema with congestive heart failure (H)       BC HEADACHE POWDER PO      Take 1 powder on the tongue every 6 hours with water as needed for headache        calcium-vitamin D 600-400 MG-UNIT per tablet    CALTRATE     Take 1 tablet by mouth daily        carvedilol 6.25 MG tablet    COREG    60 tablet    Take 1 tablet (6.25 mg) by mouth 2 times daily (with meals)    Acute pulmonary edema with congestive heart failure (H)       CENTRUM SILVER per tablet      Take 1 tablet by mouth daily        docusate sodium 100 MG capsule    COLACE    60 capsule    Take 1 capsule (100 mg) by mouth 2 times daily as needed for constipation    Slow transit constipation       ferrous sulfate 325 (65 Fe) MG tablet    IRON    100 tablet    Take 1 tablet (325 mg) by mouth 2 times daily    Anemia in stage 3 chronic kidney disease       furosemide 40 MG tablet    LASIX    90 tablet    Take 80mg (2 tabs) in the AM and 40mg (1 tab) in the PM.    Acute pulmonary edema with congestive heart failure (H)       garlic 150 MG Tabs tablet      Take 150 mg by mouth daily        hydrALAZINE 50 MG tablet    APRESOLINE    270 tablet    Take 1 tablet (50 mg) by mouth 3 times daily    Acute pulmonary edema with congestive heart failure (H)       isosorbide mononitrate 60 MG 24 hr tablet    IMDUR    180 tablet    Take 1 tablet (60 mg) by mouth 2 times daily    Ischemic cardiomyopathy       potassium chloride SA 20 MEQ CR tablet    K-DUR/KLOR-CON M    60 tablet    Take 1 tablet (20 mEq) by mouth 2 times daily    Chronic systolic heart failure (H)       VITAMIN C PO      Take 500 mg by mouth daily        vitamin E 400 UNIT capsule    Generic drug:  vitamin E      Take 400 Units by mouth daily

## 2018-07-26 DIAGNOSIS — I50.22 CHRONIC SYSTOLIC HEART FAILURE (H): ICD-10-CM

## 2018-07-26 LAB
ANION GAP SERPL CALCULATED.3IONS-SCNC: 12 MMOL/L (ref 3–14)
BUN SERPL-MCNC: 40 MG/DL (ref 7–30)
CALCIUM SERPL-MCNC: 9.4 MG/DL (ref 8.5–10.1)
CHLORIDE SERPL-SCNC: 102 MMOL/L (ref 94–109)
CO2 SERPL-SCNC: 29 MMOL/L (ref 20–32)
CREAT SERPL-MCNC: 3.59 MG/DL (ref 0.52–1.04)
GFR SERPL CREATININE-BSD FRML MDRD: 12 ML/MIN/1.7M2
GLUCOSE SERPL-MCNC: 105 MG/DL (ref 70–99)
POTASSIUM SERPL-SCNC: 4.1 MMOL/L (ref 3.4–5.3)
SODIUM SERPL-SCNC: 143 MMOL/L (ref 133–144)

## 2018-07-26 PROCEDURE — 36415 COLL VENOUS BLD VENIPUNCTURE: CPT | Performed by: PHYSICIAN ASSISTANT

## 2018-07-26 PROCEDURE — 80048 BASIC METABOLIC PNL TOTAL CA: CPT | Performed by: PHYSICIAN ASSISTANT

## 2018-07-31 ENCOUNTER — CARE COORDINATION (OUTPATIENT)
Dept: CARDIOLOGY | Facility: CLINIC | Age: 78
End: 2018-07-31

## 2018-07-31 DIAGNOSIS — I50.1 ACUTE PULMONARY EDEMA WITH CONGESTIVE HEART FAILURE (H): ICD-10-CM

## 2018-07-31 RX ORDER — ATORVASTATIN CALCIUM 20 MG/1
20 TABLET, FILM COATED ORAL DAILY
Qty: 30 TABLET | Refills: 0 | Status: SHIPPED | OUTPATIENT
Start: 2018-07-31 | End: 2018-08-14

## 2018-07-31 RX ORDER — AMLODIPINE BESYLATE 10 MG/1
10 TABLET ORAL AT BEDTIME
Qty: 30 TABLET | Refills: 0 | Status: SHIPPED | OUTPATIENT
Start: 2018-07-31 | End: 2018-08-30

## 2018-07-31 RX ORDER — FUROSEMIDE 40 MG
40 TABLET ORAL 2 TIMES DAILY
Qty: 90 TABLET | Refills: 3 | Status: SHIPPED | OUTPATIENT
Start: 2018-07-31 | End: 2019-01-14

## 2018-07-31 RX ORDER — CARVEDILOL 6.25 MG/1
6.25 TABLET ORAL 2 TIMES DAILY WITH MEALS
Qty: 60 TABLET | Refills: 0 | Status: SHIPPED | OUTPATIENT
Start: 2018-07-31 | End: 2018-08-30

## 2018-07-31 NOTE — PROGRESS NOTES
After multiple attempts, Jaja answered this afternoons phone call. After reviewing labs from 7/26 and reviewing patient weights, the following orders given:     Date: 7/31/2018    Time of Call: 3:34 PM     Diagnosis: Heart failure     [Torb: ] Ordering provider: Celestina CARTER  Order:  Hold evening dose of lasix. Decrease lasix to 40 mg bid starting tomorrow.      Order received by: Valeria Cristobal Rn     Follow-up/additional notes: Pt received cardiocom and will start weighing herself on 8/1.

## 2018-08-14 DIAGNOSIS — I50.1 ACUTE PULMONARY EDEMA WITH CONGESTIVE HEART FAILURE (H): ICD-10-CM

## 2018-08-14 DIAGNOSIS — Z79.899 ON STATIN THERAPY: Primary | ICD-10-CM

## 2018-08-14 RX ORDER — ATORVASTATIN CALCIUM 20 MG/1
20 TABLET, FILM COATED ORAL DAILY
Qty: 30 TABLET | Refills: 0 | Status: SHIPPED | OUTPATIENT
Start: 2018-08-14 | End: 2018-08-30

## 2018-08-14 NOTE — TELEPHONE ENCOUNTER
Refilled Atorvastatin for one month supply. Needs LFTS and Lipid Panel checked. Will enter labs and have  reach out to schedule lab appt before she sees Dr Pepe on 8/30.

## 2018-08-30 ENCOUNTER — HOSPITAL ENCOUNTER (OUTPATIENT)
Facility: CLINIC | Age: 78
Setting detail: SPECIMEN
Discharge: HOME | End: 2018-08-30
Admitting: PHYSICIAN ASSISTANT
Payer: MEDICARE

## 2018-08-30 ENCOUNTER — OFFICE VISIT (OUTPATIENT)
Dept: CARDIOLOGY | Facility: CLINIC | Age: 78
End: 2018-08-30
Payer: MEDICARE

## 2018-08-30 VITALS
HEART RATE: 76 BPM | DIASTOLIC BLOOD PRESSURE: 79 MMHG | WEIGHT: 184.4 LBS | SYSTOLIC BLOOD PRESSURE: 147 MMHG | OXYGEN SATURATION: 96 % | BODY MASS INDEX: 30.69 KG/M2

## 2018-08-30 DIAGNOSIS — I27.22 PULMONARY HYPERTENSION DUE TO LEFT HEART DISEASE (H): ICD-10-CM

## 2018-08-30 DIAGNOSIS — I50.22 CHRONIC SYSTOLIC HEART FAILURE (H): Primary | ICD-10-CM

## 2018-08-30 DIAGNOSIS — I36.1 TRICUSPID VALVE REGURGITATION, NONRHEUMATIC: ICD-10-CM

## 2018-08-30 DIAGNOSIS — I25.10 CORONARY ARTERY DISEASE INVOLVING NATIVE CORONARY ARTERY OF NATIVE HEART WITHOUT ANGINA PECTORIS: ICD-10-CM

## 2018-08-30 DIAGNOSIS — Z79.899 ON STATIN THERAPY: ICD-10-CM

## 2018-08-30 DIAGNOSIS — I10 BENIGN ESSENTIAL HYPERTENSION: ICD-10-CM

## 2018-08-30 DIAGNOSIS — I50.1 ACUTE PULMONARY EDEMA WITH CONGESTIVE HEART FAILURE (H): ICD-10-CM

## 2018-08-30 DIAGNOSIS — I34.0 NON-RHEUMATIC MITRAL REGURGITATION: ICD-10-CM

## 2018-08-30 DIAGNOSIS — N18.4 CKD (CHRONIC KIDNEY DISEASE) STAGE 4, GFR 15-29 ML/MIN (H): ICD-10-CM

## 2018-08-30 LAB
ALBUMIN SERPL-MCNC: 4.1 G/DL (ref 3.4–5)
ALP SERPL-CCNC: 98 U/L (ref 40–150)
ALT SERPL W P-5'-P-CCNC: 16 U/L (ref 0–50)
ANION GAP SERPL CALCULATED.3IONS-SCNC: 7 MMOL/L (ref 3–14)
AST SERPL W P-5'-P-CCNC: 21 U/L (ref 0–45)
BILIRUB DIRECT SERPL-MCNC: 0.1 MG/DL (ref 0–0.2)
BILIRUB SERPL-MCNC: 0.5 MG/DL (ref 0.2–1.3)
BUN SERPL-MCNC: 40 MG/DL (ref 7–30)
CALCIUM SERPL-MCNC: 9.7 MG/DL (ref 8.5–10.1)
CHLORIDE SERPL-SCNC: 103 MMOL/L (ref 94–109)
CHOLEST SERPL-MCNC: 144 MG/DL
CO2 SERPL-SCNC: 31 MMOL/L (ref 20–32)
CREAT SERPL-MCNC: 2.97 MG/DL (ref 0.52–1.04)
GFR SERPL CREATININE-BSD FRML MDRD: 15 ML/MIN/1.7M2
GLUCOSE SERPL-MCNC: 104 MG/DL (ref 70–99)
HDLC SERPL-MCNC: 48 MG/DL
LDLC SERPL CALC-MCNC: 55 MG/DL
NONHDLC SERPL-MCNC: 96 MG/DL
POTASSIUM SERPL-SCNC: 3.5 MMOL/L (ref 3.4–5.3)
PROT SERPL-MCNC: 8.1 G/DL (ref 6.8–8.8)
SODIUM SERPL-SCNC: 141 MMOL/L (ref 133–144)
TRIGL SERPL-MCNC: 206 MG/DL

## 2018-08-30 PROCEDURE — 80076 HEPATIC FUNCTION PANEL: CPT | Performed by: PHYSICIAN ASSISTANT

## 2018-08-30 PROCEDURE — 80048 BASIC METABOLIC PNL TOTAL CA: CPT | Performed by: INTERNAL MEDICINE

## 2018-08-30 PROCEDURE — 36415 COLL VENOUS BLD VENIPUNCTURE: CPT | Performed by: INTERNAL MEDICINE

## 2018-08-30 PROCEDURE — 80061 LIPID PANEL: CPT | Performed by: PHYSICIAN ASSISTANT

## 2018-08-30 PROCEDURE — 99215 OFFICE O/P EST HI 40 MIN: CPT | Performed by: INTERNAL MEDICINE

## 2018-08-30 RX ORDER — AMLODIPINE BESYLATE 10 MG/1
10 TABLET ORAL AT BEDTIME
Qty: 90 TABLET | Refills: 0 | Status: SHIPPED | OUTPATIENT
Start: 2018-08-30 | End: 2018-08-31

## 2018-08-30 RX ORDER — CARVEDILOL 6.25 MG/1
6.25 TABLET ORAL 2 TIMES DAILY WITH MEALS
Qty: 60 TABLET | Refills: 3 | Status: SHIPPED | OUTPATIENT
Start: 2018-08-30 | End: 2018-08-30

## 2018-08-30 RX ORDER — CARVEDILOL 12.5 MG/1
12.5 TABLET ORAL 2 TIMES DAILY WITH MEALS
Qty: 60 TABLET | Refills: 5 | Status: SHIPPED | OUTPATIENT
Start: 2018-08-30 | End: 2019-04-19

## 2018-08-30 ASSESSMENT — PAIN SCALES - GENERAL: PAINLEVEL: NO PAIN (0)

## 2018-08-30 NOTE — PROGRESS NOTES
Chief complaint: re-establish cardiology follow up, chronic systolic HF    HPI:   Jaja Ernandez is a 78 year-old woman with chronic systolic HF due to NICM LVEF=24% (TTE 6/29/18), HTN, DM2, CKD4, and non-obstructive CAD who presents to re-establish Cardiology follow up after lapse in follow up from 10/6/16 to 6/29/19 and recent admission for decompensated HF in the setting of medication non-adherence.    She was admitted [facility] 6/29-7/2 for decompensated HF (exertional dyspnea/cough), severe HTN (190s/130s), and TATIANA-on-CKD (Cr 1.8 > 3.5) in the setting of self-discontinuation of all of her antihypertensive meds for 3 months. She was treated with diuretics and restarted on neurohormonal HF therapy/antihypertensives. TTE in-house was notable for fall in her LVEF (24% from 50% in 2015), elevated PA pressure, and worsening function MR/TR.     She was discharged home and established care in the [facility] clinic, where she has been followed with gradual up-titration of her medical therapy. She reports significant improvement in her functional capacity, and reports that she was at least once able to walk up 2 flights (32 steps), and was only short of breath at the top. Home -142/76-86; average home weights ~180.     She reports good adherence to her medical therapy and states that she is tolerating her current regimen well.     She denies any chest pain, dyspnea above baseline, PND, orthopnea, peripheral edema, palpitations, lightheadedness or syncope.     PAST MEDICAL HISTORY:  Past Medical History:   Diagnosis Date     Cataract      Chronic renal failure, stage 3 (moderate)      Congestive heart failure, unspecified      Hypertension      Nonischemic cardiomyopathy (H) 8/25/2015     Other nonspecific abnormal cardiovascular system function study 5/15/2015     Systolic CHF (H)     LVEF 35-40% 3/2015       CURRENT MEDICATIONS:  Current Outpatient Prescriptions   Medication Sig Dispense Refill     amLODIPine (NORVASC) 10  MG tablet Take 1 tablet (10 mg) by mouth At Bedtime 30 tablet 0     Ascorbic Acid (VITAMIN C PO) Take 500 mg by mouth daily        aspirin 81 MG tablet Take 81 mg by mouth daily        Aspirin-Salicylamide-Caffeine (BC HEADACHE POWDER PO) Take 1 powder on the tongue every 6 hours with water as needed for headache       atorvastatin (LIPITOR) 20 MG tablet Take 1 tablet (20 mg) by mouth daily 30 tablet 0     calcium-vitamin D (CALTRATE) 600-400 MG-UNIT per tablet Take 1 tablet by mouth daily       carvedilol (COREG) 6.25 MG tablet Take 1 tablet (6.25 mg) by mouth 2 times daily (with meals) 60 tablet 3     docusate sodium (COLACE) 100 MG capsule Take 1 capsule (100 mg) by mouth 2 times daily as needed for constipation 60 capsule 0     ferrous sulfate (IRON) 325 (65 Fe) MG tablet Take 1 tablet (325 mg) by mouth 2 times daily 100 tablet 0     furosemide (LASIX) 40 MG tablet Take 1 tablet (40 mg) by mouth 2 times daily 90 tablet 3     garlic 150 MG TABS Take 150 mg by mouth daily       hydrALAZINE (APRESOLINE) 50 MG tablet Take 1 tablet (50 mg) by mouth 3 times daily 270 tablet 3     isosorbide mononitrate (IMDUR) 60 MG 24 hr tablet Take 1 tablet (60 mg) by mouth 2 times daily 180 tablet 3     Multiple Vitamins-Minerals (CENTRUM SILVER) per tablet Take 1 tablet by mouth daily       potassium chloride SA (K-DUR/KLOR-CON M) 20 MEQ CR tablet Take 1 tablet (20 mEq) by mouth 2 times daily 60 tablet 3     vitamin E (VITAMIN E-400) 400 UNIT capsule Take 400 Units by mouth daily        [DISCONTINUED] carvedilol (COREG) 6.25 MG tablet Take 1 tablet (6.25 mg) by mouth 2 times daily (with meals) 60 tablet 0       PAST SURGICAL HISTORY:  Past Surgical History:   Procedure Laterality Date     ANGIOGRAM      3/2015     BRAIN TUMOR RESECTION  7-8-1997    Benign brain tumor     csection       HYSTERECTOMY TOTAL ABDOMINAL      benign condition       ALLERGIES:     Allergies   Allergen Reactions     Demerol [Meperidine] Nausea and Vomiting        FAMILY HISTORY:  Family History   Problem Relation Age of Onset     Breast Cancer Mother 50     Cancer Mother      Asthma Other      Unknown/Adopted Father      Breast Cancer Sister 70     Cancer Sister      Hypertension Daughter      Diabetes No family hx of      Cerebrovascular Disease No family hx of      Thyroid Disease No family hx of      Glaucoma No family hx of      Macular Degeneration No family hx of      SOCIAL HISTORY:  Social History   Substance Use Topics     Smoking status: Never Smoker     Smokeless tobacco: Never Used     Alcohol use No       ROS:   A comprehensive 14 point review of systems is negative other than as mentioned in HPI.    Exam:  /79 (BP Location: Left arm, Patient Position: Chair, Cuff Size: Adult Large)  Pulse 76  Wt 83.6 kg (184 lb 6.4 oz)  SpO2 96%  BMI 30.69 kg/m2  GENERAL APPEARANCE: healthy, alert and no distress  EYES: no icterus, no xanthelasmas  ENT: normal palate, mucosa moist, no central cyanosis  NECK:  JVP pre-V 8 cmH2O with large V-wave  RESPIRATORY: lungs clear to auscultation - no rales, rhonchi or wheezes, no use of accessory muscles, no retractions, respirations are unlabored, normal respiratory rate  CARDIOVASCULAR: regular rhythm, +3/6 LLSB holosystolic murmur. No s3/s4.   GI: soft, non tender, without hepatosplenomegaly, no masses palpable, bowel sounds normal, aorta not enlarged by palpation, no abdominal bruits  EXTREMITIES: peripheral pulses normal, no edema, no bruits  NEURO: alert and oriented to person/place/time, normal speech, gait and affect  VASC: Radial pulses 2+ bilaterally.  SKIN: no ecchymoses, no rashes.  PSYCH: cooperative, affect appropriate.     Labs:  Reviewed. Of note:  BMP today (8/30/18): Cr 2.97 (<3.6<3.0<3.3 (6/29); 1.89 on 10/27/16.)      Testing/Procedures:    I personally visualized and interpreted:  ECG 6/29/18: sinus with PACs, VR 92, LVH with repolarization abnormality, left atrial abnormality,  QRS 98  msec.    TTE 6/29/18: Moderatley dilated LV, severely reduced function (LVEF=24%) with diffuse hypokinesis.   RV mild-mod dilation/dysfunction  Moderate-to-severe MR  Severe TR  RVSP 67+RAP    TTE 6/8/15: LVEF=49%, improved from 3/2015  TTE 3/30/15: LVEF=35-40%, moderate diffuse hypokinesis. Severe LAE. PASP~43 mmHg. Small PFO.     Coronary angiogram/RHC 5/19/15:   RA 3 RV 26/4 PA 26/13(20) W 13 Rashmi CO/CI 3.3/1.9 TDCO/CI 4.0/2.2 PVR 6.0   LM 20% distal  LAD 30% ostial    -D1 diffuse distal disease  LCX 20% ostial, OMs with luminal irregularities  RCA (dominant) no disease    Outside records from [facility] and [facility] were obtained.  Outside results of note:  Outside notes and testing form [facility] and [facility] have been reviewed and relevant data incorporated into HPI.    Assessment and Plan:   77YO woman with chronic systolic HF due to NICM LVEF=24% (TTE 6/29/18), HTN, DM2, CKD4, and non-obstructive CAD who presents to re-establish Cardiology follow up after lapse in follow up from 10/6/16 to 6/29/19 and recent admission for decompensated HF in the setting of medication non-adherence.    #Chronic systolic HF due to NICM (likely hypertensive cardiomyopathy), worsened from 2015:  LVEF=24% on 6/29 (from 50% in 2015)  NYHA class III, stage C. Clinically euvolemic and well-perfused on today's exam.   -Beta blocker: increase carvedilol to 12.5 mg BID   -ACEI/ARB/ARNI: relatively contraindicated (CKD4, TATIANA 6/29/18)-- renal function appears to be plateauing and I do not believe this is an absolute contraindication.   Would strongly consider starting low-dose ACEI at next [facility] visit if renal function is stable or improved (if renal function ultimately recovers significantly, would favor starting ARNI.)    -aldosterone antagonist: deferred pending up-titration of other therapies  -afterload reduction: continue hydralazine/ISMN  -SCD prophylaxis: deferred pending LVEF assessment on optimal  medical therapy  -CRT: not a candidate (narrow QRS)    #Secondary/functional mitral regurgitation, moderate-to-severe:  #Secondary/functional tricuspid regurgitaiton, severe:  #Pulmonary venous hypertension (WHO Group II)  All diagnosed in the setting of acute decompensated HF on her 6/29/18 TTE. Suspect these may have improved significantly with improvement in filling pressures, though exam is still suggestive of significant TR.  -repeat TTE  -manage HF as above    #HTN with nephropathy and cardiomyopathy:  Control improving, increase carvedilol as above.    #CKD4 due to hypertension/DM:  With no measured Cr/GFR between 2016 and HF admission on 6/29/18, it is unclear if her worsening of renal function represents an TATIANA or progression of CKD.  Cr is very slightly improved today from 7/26 (2.97 from 3.59) but may represent her new baseline.  -anticipate cautious re-introduction of ACEI at next [facility] visit  -closely monitor renal function, avoid nephrotoxins  -Nephrology follow up as scheduled    #Non-obstructive CAD w/o angina (based on cath from 2015): continue ASA 81 mg daily, atorvastatin 20 mg daily      The patient states understanding and is agreeable with plan.     Darrel Pepe MD  Cardiology    CC  GONZALEZ GIBSON

## 2018-08-30 NOTE — NURSING NOTE
Jaja Ernandez's goals for this visit include:   Chief Complaint   Patient presents with     RECHECK     CHF       She requests these members of her care team be copied on today's visit information: PCP    PCP: Edi Ribeiro    Referring Provider:  Nancy Auguste PA-C  [address] [place] [id number]    /79 (BP Location: Left arm, Patient Position: Chair, Cuff Size: Adult Large)  Pulse 76  Wt 83.6 kg (184 lb 6.4 oz)  SpO2 96%  BMI 30.69 kg/m2    Do you need any medication refills at today's visit? Carvedilol and amlodipine      Alta Hernandez, [facility]

## 2018-08-30 NOTE — MR AVS SNAPSHOT
After Visit Summary   8/30/2018    Jaja Ernandez    MRN: [id number]           Patient Information     Date Of Birth          1940        Visit Information        Provider Department      8/30/2018 12:30 PM Darrel Pepe MD [facility] CARDIOVASCULAR      Today's Diagnoses     Chronic systolic heart failure (H)    -  1    CKD (chronic kidney disease) stage 4, GFR 15-29 ml/min (H)        Non-obstructive CAD without angina        Non-rheumatic mitral regurgitation        Tricuspid valve regurgitation, nonrheumatic        Pulmonary hypertension due to left heart disease        Benign essential hypertension          Care Instructions    1. Increase carvedilol to 12.5 mg twice daily  2. Echocardiogram at [place] within 2 weeks  3. Follow up with [facility] clinic in 1 month  4. Follow up with Dr. Pepe in 3 months    Please call [phone] and ask for Cardiology with any new symptoms, questions, or concerns.     For urgent after hour care, please call the [facility] at [phone], or the [facility] at [phone], and ask to speak to the cardiologist on call.    When to Call Your Doctor  Call your doctor if you have any of the following:  Changed or worsened chest pain.  Chest pain that started within the past 2 months and is now more severe.   Chest pain that happens 3 or more times per day.   Chest pain that suddenly becomes more frequent or severe, lasts longer, or is brought on by less exertion than before.   Chest pain that occurs at rest, with no obvious exertion or stress. It might wake you from sleep.  Call 911 or other emergency services if you have CAD that has been diagnosed by a doctor and you have chest pain that doesn't go away after using your home treatment plan for angina.  When in Doubt:  If you aren't sure if your symptoms are serious or decide not to call 911, call our [facility] at  [phone] or the [facility] at [phone] and ask to speak to the cardiologist on call. They can help you decide if your pain is an emergency or not.              Follow-ups after your visit        Additional Services     Follow-Up with [facility]           Follow-Up with Cardiologist                 Your next 10 appointments already scheduled     Sep 10, 2018 11:30 AM CDT   Ech Complete With Definity with [id number], MG ECHO TECH   [facility])    [address] [address] [place] [id number] [phone]           1.  Please bring or wear a comfortable two-piece outfit. 2.  You may eat, drink and take your normal medicines. 3.  For any questions that cannot be answered, please contact the ordering physician 4.  Please do not wear perfumes or scented lotions on the day of your exam.            Sep 21, 2018  8:30 AM CDT   (Arrive by 8:15 AM)   [facility] RETURN with KIKE Cruz [facility])    [address] [place] [id number] [phone]            Nov 01, 2018 12:30 PM CDT   Return Visit with Darrel Pepe MD   [facility])    [address] [place] [id number] [phone]              Future tests that were ordered for you today     Open Future Orders        Priority Expected Expires Ordered    Echo Complete With Definity Routine 8/31/2018 8/30/2019 8/30/2018    Follow-Up with Cardiologist Routine 11/28/2018 2/26/2019 8/30/2018    Follow-Up with [facility] Routine 9/29/2018 11/28/2018 8/30/2018            Who to contact     If you have questions or need follow up information about today's clinic visit or your schedule please contact [facility] directly at [phone].  Normal or non-critical lab and imaging results will be communicated to you by [facility]  letter or phone within 4 business days after the clinic has received the results. If you do not hear from us within 7 days, please contact the clinic through [employer] or phone. If you have a critical or abnormal lab result, we will notify you by phone as soon as possible.  Submit refill requests through [employer] or call your pharmacy and they will forward the refill request to us. Please allow 3 business days for your refill to be completed.          Additional Information About Your Visit        Getix Information     Getix gives you secure access to your electronic health record. If you see a primary care provider, you can also send messages to your care team and make appointments. If you have questions, please call your primary care clinic.  If you do not have a primary care provider, please call [phone] and they will assist you.      Get[employer] is an electronic gateway that provides easy, online access to your medical records. With Get[employer], you can request a clinic appointment, read your test results, renew a prescription or communicate with your care team.     To access your existing account, please contact your [facility] or call [phone] for assistance.        Care EveryWhere ID     This is your Care EveryWhere ID. This could be used by other organizations to access your [facility] medical records  [id number]        Your Vitals Were     Pulse Pulse Oximetry BMI (Body Mass Index)             76 96% 30.69 kg/m2          Blood Pressure from Last 3 Encounters:   08/30/18 147/79   07/19/18 103/68   07/10/18 135/82    Weight from Last 3 Encounters:   08/30/18 83.6 kg (184 lb 6.4 oz)   07/19/18 83.9 kg (185 lb)   07/10/18 84.3 kg (185 lb 14.4 oz)              We Performed the Following     Basic metabolic panel  (Ca, Cl, CO2, Creat, Gluc, K, Na, BUN)     Follow-Up with Cardiologist          Today's Medication Changes          These changes are accurate as of 8/30/18  1:57 PM.   If you have any questions, ask your nurse or doctor.               Start taking these medicines.        Dose/Directions    ACE/ARB/ARNI NOT PRESCRIBED (INTENTIONAL)   Used for:  Chronic systolic heart failure (H), CKD (chronic kidney disease) stage 4, GFR 15-29 ml/min (H)   Started by:  Darrel Pepe MD        Please choose reason not prescribed, below   Refills:  0       carvedilol 12.5 MG tablet   Commonly known as:  COREG   Started by:  Darrel Pepe MD        Dose:  12.5 mg   Take 1 tablet (12.5 mg) by mouth 2 times daily (with meals)   Quantity:  60 tablet   Refills:  5            Where to get your medicines      These medications were sent to [facility] [address] [place] [id number]     Phone:  [phone]     amLODIPine 10 MG tablet    carvedilol 12.5 MG tablet         Some of these will need a paper prescription and others can be bought over the counter.  Ask your nurse if you have questions.     You don't need a prescription for these medications     ACE/ARB/ARNI NOT PRESCRIBED (INTENTIONAL)                Primary Care Provider Office Phone # Fax #    Edi Ribeiro [phone] [address] [facility] [address]        Equal Access to Services     NADYA JOHNSON AH: Hadii harshal brunson hadasho Soomaali, waaxda luqadaha, qaybta kaalmada adeegyada, waxay deysiin hayjaky childress. So [facility] [phone].    ATENCIÓN: Si habla español, tiene a richter disposición servicios gratuitos de asistencia lingüística. Llame al [phone].    We comply with applicable federal civil rights laws and [place] laws. We do not discriminate on the basis of race, color, national origin, age, disability, sex, sexual orientation, or gender identity.            Thank you!     Thank you for choosing [facility]  for your care. Our goal is always to provide you with excellent care. Hearing back from  our patients is one way we can continue to improve our services. Please take a few minutes to complete the written survey that you may receive in the mail after your visit with us. Thank you!             Your Updated Medication List - Protect others around you: Learn how to safely use, store and throw away your medicines at www.disposemymeds.org.          This list is accurate as of 8/30/18  1:57 PM.  Always use your most recent med list.                   Brand Name Dispense Instructions for use Diagnosis    ACE/ARB/ARNI NOT PRESCRIBED (INTENTIONAL)      Please choose reason not prescribed, below    Chronic systolic heart failure (H), CKD (chronic kidney disease) stage 4, GFR 15-29 ml/min (H)       amLODIPine 10 MG tablet    NORVASC    90 tablet    Take 1 tablet (10 mg) by mouth At Bedtime        aspirin 81 MG tablet      Take 81 mg by mouth daily        atorvastatin 20 MG tablet    LIPITOR    30 tablet    Take 1 tablet (20 mg) by mouth daily    Acute pulmonary edema with congestive heart failure (H)       BC HEADACHE POWDER PO      Take 1 powder on the tongue every 6 hours with water as needed for headache        calcium carbonate 600 mg-vitamin D 400 units 600-400 MG-UNIT per tablet    CALTRATE     Take 1 tablet by mouth daily        carvedilol 12.5 MG tablet    COREG    60 tablet    Take 1 tablet (12.5 mg) by mouth 2 times daily (with meals)        CENTRUM SILVER per tablet      Take 1 tablet by mouth daily        docusate sodium 100 MG capsule    COLACE    60 capsule    Take 1 capsule (100 mg) by mouth 2 times daily as needed for constipation    Slow transit constipation       ferrous sulfate 325 (65 Fe) MG tablet    IRON    100 tablet    Take 1 tablet (325 mg) by mouth 2 times daily    Anemia in stage 3 chronic kidney disease       furosemide 40 MG tablet    LASIX    90 tablet    Take 1 tablet (40 mg) by mouth 2 times daily    Acute pulmonary edema with congestive heart failure (H)       garlic 150 MG Tabs  tablet      Take 150 mg by mouth daily        hydrALAZINE 50 MG tablet    APRESOLINE    270 tablet    Take 1 tablet (50 mg) by mouth 3 times daily    Acute pulmonary edema with congestive heart failure (H)       isosorbide mononitrate 60 MG 24 hr tablet    IMDUR    180 tablet    Take 1 tablet (60 mg) by mouth 2 times daily    Ischemic cardiomyopathy       potassium chloride SA 20 MEQ CR tablet    K-DUR/KLOR-CON M    60 tablet    Take 1 tablet (20 mEq) by mouth 2 times daily    Chronic systolic heart failure (H)       VITAMIN C PO      Take 500 mg by mouth daily        vitamin E 400 UNIT capsule   Generic drug:  vitamin E      Take 400 Units by mouth daily

## 2018-08-30 NOTE — PATIENT INSTRUCTIONS
1. Increase carvedilol to 12.5 mg twice daily  2. Echocardiogram at [place] within 2 weeks  3. Follow up with [facility] clinic in 1 month  4. Follow up with Dr. Pepe in 3 months    Please call [phone] and ask for Cardiology with any new symptoms, questions, or concerns.     For urgent after hour care, please call the [place] [facility] at [phone], or the [facility] at [phone], and ask to speak to the cardiologist on call.    When to Call Your Doctor  Call your doctor if you have any of the following:  Changed or worsened chest pain.  Chest pain that started within the past 2 months and is now more severe.   Chest pain that happens 3 or more times per day.   Chest pain that suddenly becomes more frequent or severe, lasts longer, or is brought on by less exertion than before.   Chest pain that occurs at rest, with no obvious exertion or stress. It might wake you from sleep.  Call 911 or other emergency services if you have CAD that has been diagnosed by a doctor and you have chest pain that doesn't go away after using your home treatment plan for angina.  When in Doubt:  If you aren't sure if your symptoms are serious or decide not to call 911, call our [place] [facility] at [phone] or the [facility] at [phone] and ask to speak to the cardiologist on call. They can help you decide if your pain is an emergency or not.

## 2018-08-31 ENCOUNTER — CARE COORDINATION (OUTPATIENT)
Dept: CARDIOLOGY | Facility: CLINIC | Age: 78
End: 2018-08-31

## 2018-08-31 DIAGNOSIS — I10 HTN (HYPERTENSION): Primary | ICD-10-CM

## 2018-08-31 RX ORDER — ATORVASTATIN CALCIUM 20 MG/1
20 TABLET, FILM COATED ORAL DAILY
Qty: 30 TABLET | Refills: 3 | Status: SHIPPED | OUTPATIENT
Start: 2018-08-31 | End: 2019-01-18

## 2018-08-31 RX ORDER — AMLODIPINE BESYLATE 10 MG/1
10 TABLET ORAL AT BEDTIME
Qty: 90 TABLET | Refills: 3 | Status: SHIPPED | OUTPATIENT
Start: 2018-08-31 | End: 2019-05-16

## 2018-08-31 NOTE — PROGRESS NOTES
Called Jaja to review LFT and lipid profile. Left message. Also sent message through [employer]. Asked to call with questions. Refilled Lipitor.   Kerry Fernandez RN

## 2018-09-10 ENCOUNTER — RADIANT APPOINTMENT (OUTPATIENT)
Dept: CARDIOLOGY | Facility: CLINIC | Age: 78
End: 2018-09-10
Attending: INTERNAL MEDICINE
Payer: MEDICARE

## 2018-09-10 DIAGNOSIS — I27.22 PULMONARY HYPERTENSION DUE TO LEFT HEART DISEASE (H): ICD-10-CM

## 2018-09-10 DIAGNOSIS — N18.4 CKD (CHRONIC KIDNEY DISEASE) STAGE 4, GFR 15-29 ML/MIN (H): ICD-10-CM

## 2018-09-10 DIAGNOSIS — I34.0 NON-RHEUMATIC MITRAL REGURGITATION: ICD-10-CM

## 2018-09-10 DIAGNOSIS — I36.1 TRICUSPID VALVE REGURGITATION, NONRHEUMATIC: ICD-10-CM

## 2018-09-10 DIAGNOSIS — I25.10 CORONARY ARTERY DISEASE INVOLVING NATIVE CORONARY ARTERY OF NATIVE HEART WITHOUT ANGINA PECTORIS: ICD-10-CM

## 2018-09-10 DIAGNOSIS — I50.22 CHRONIC SYSTOLIC HEART FAILURE (H): ICD-10-CM

## 2018-09-10 PROCEDURE — 93306 TTE W/DOPPLER COMPLETE: CPT

## 2018-09-10 RX ADMIN — Medication 4 ML: at 12:45

## 2018-09-17 DIAGNOSIS — I50.22 CHRONIC SYSTOLIC CONGESTIVE HEART FAILURE (H): Primary | ICD-10-CM

## 2018-09-24 DIAGNOSIS — I50.22 CHRONIC SYSTOLIC HEART FAILURE (H): Primary | ICD-10-CM

## 2018-09-26 DIAGNOSIS — I50.22 CHRONIC SYSTOLIC HEART FAILURE (H): ICD-10-CM

## 2018-09-26 LAB
ANION GAP SERPL CALCULATED.3IONS-SCNC: 11 MMOL/L (ref 3–14)
BUN SERPL-MCNC: 47 MG/DL (ref 7–30)
CALCIUM SERPL-MCNC: 9.8 MG/DL (ref 8.5–10.1)
CHLORIDE SERPL-SCNC: 101 MMOL/L (ref 94–109)
CO2 SERPL-SCNC: 30 MMOL/L (ref 20–32)
CREAT SERPL-MCNC: 3.16 MG/DL (ref 0.52–1.04)
GFR SERPL CREATININE-BSD FRML MDRD: 14 ML/MIN/1.7M2
GLUCOSE SERPL-MCNC: 115 MG/DL (ref 70–99)
POTASSIUM SERPL-SCNC: 3.2 MMOL/L (ref 3.4–5.3)
SODIUM SERPL-SCNC: 142 MMOL/L (ref 133–144)

## 2018-09-26 PROCEDURE — 80048 BASIC METABOLIC PNL TOTAL CA: CPT | Performed by: PHYSICIAN ASSISTANT

## 2018-09-26 PROCEDURE — 36415 COLL VENOUS BLD VENIPUNCTURE: CPT | Performed by: PHYSICIAN ASSISTANT

## 2018-09-27 ENCOUNTER — OFFICE VISIT (OUTPATIENT)
Dept: CARDIOLOGY | Facility: CLINIC | Age: 78
End: 2018-09-27
Payer: MEDICARE

## 2018-09-27 VITALS
DIASTOLIC BLOOD PRESSURE: 76 MMHG | WEIGHT: 184 LBS | OXYGEN SATURATION: 98 % | BODY MASS INDEX: 30.62 KG/M2 | SYSTOLIC BLOOD PRESSURE: 126 MMHG | HEART RATE: 67 BPM

## 2018-09-27 DIAGNOSIS — I50.22 CHRONIC SYSTOLIC HEART FAILURE (H): ICD-10-CM

## 2018-09-27 PROCEDURE — 99214 OFFICE O/P EST MOD 30 MIN: CPT | Performed by: PHYSICIAN ASSISTANT

## 2018-09-27 NOTE — LETTER
9/27/2018       RE: Jaja Ernandez  [address]  [facility] [address]       Dear Colleague,    Thank you for the opportunity to participate in the care of your patient, Jaja Ernandez, at the [facility] PHYSICIANS HEART AT [facility] Please see a copy of my visit note below.    CARDIOLOGY CLINIC  Jaja Ernandez is a 78 year old female with chronic systolic heart failure, breast cancer in remission and hypertension who presents to CORE for follow up.     I met her a month ago at which time she had improving hypervolemia. She had hospitalization prior for hypertensive urgency, decompensated heart failure and acute renal failure due to stopping her medication for several months. Her Echo at that time showed drop in EF from 50% previous to 24% with worsening MR and TR. Since seeing me she established care with Dr. Pepe a month ago. He increased her carvedilol to 12.5mg BID but she notes today she hadn't increased it until today as she used up what she had left. He ordered for repeat Echo which does show improvement in her LVEF. She has been on Lasix 40mg BID with stable weight. She can walk to her mailbox without dyspnea (at least  feet). She is sleeping well, her energy is improved. No chest pain, dizziness, cough, palpitations, PND, orthopnea or bloating.     She takes her medications and understands the importance now. She does note she was out of her potassium for a few days, is picking it up today. She is on CardioCom with controlled blood pressures though doesn't use daily for weights either.     PAST MEDICAL HISTORY:  Hypertension  CKD stage IV  Cardiomyopathy- EF in 2015 35%, recovered to 49% as of June 2015, then down to 20-30% June, now back to 45%    FAMILY HISTORY:  Family History   Problem Relation Age of Onset     Breast Cancer Mother 50     Cancer Mother      Asthma Other      Unknown/Adopted Father      Breast Cancer  Sister 70     Cancer Sister      Hypertension Daughter      Diabetes No family hx of      Cerebrovascular Disease No family hx of      Thyroid Disease No family hx of      Glaucoma No family hx of      Macular Degeneration No family hx of      SOCIAL HISTORY: , lives alone in apartment, daughter here with her today. Never a smoker.     CURRENT MEDICATIONS:  Outpatient Medications Prior to Visit   Medication Sig Dispense Refill     ACE/ARB/ARNI NOT PRESCRIBED, INTENTIONAL, Please choose reason not prescribed, below       amLODIPine (NORVASC) 10 MG tablet Take 1 tablet (10 mg) by mouth At Bedtime 90 tablet 3     Ascorbic Acid (VITAMIN C PO) Take 500 mg by mouth daily        aspirin 81 MG tablet Take 81 mg by mouth daily        Aspirin-Salicylamide-Caffeine (BC HEADACHE POWDER PO) Take 1 powder on the tongue every 6 hours with water as needed for headache       atorvastatin (LIPITOR) 20 MG tablet Take 1 tablet (20 mg) by mouth daily 30 tablet 3     calcium-vitamin D (CALTRATE) 600-400 MG-UNIT per tablet Take 1 tablet by mouth daily       carvedilol (COREG) 12.5 MG tablet Take 1 tablet (12.5 mg) by mouth 2 times daily (with meals) 60 tablet 5     docusate sodium (COLACE) 100 MG capsule Take 1 capsule (100 mg) by mouth 2 times daily as needed for constipation 60 capsule 0     ferrous sulfate (IRON) 325 (65 Fe) MG tablet Take 1 tablet (325 mg) by mouth 2 times daily 100 tablet 0     furosemide (LASIX) 40 MG tablet Take 1 tablet (40 mg) by mouth 2 times daily 90 tablet 3     garlic 150 MG TABS Take 150 mg by mouth daily       hydrALAZINE (APRESOLINE) 50 MG tablet Take 1 tablet (50 mg) by mouth 3 times daily 270 tablet 3     isosorbide mononitrate (IMDUR) 60 MG 24 hr tablet Take 1 tablet (60 mg) by mouth 2 times daily 180 tablet 3     Multiple Vitamins-Minerals (CENTRUM SILVER) per tablet Take 1 tablet by mouth daily       potassium chloride SA (K-DUR/KLOR-CON M) 20 MEQ CR tablet Take 1 tablet (20 mEq) by mouth 2  times daily 60 tablet 3     vitamin E (VITAMIN E-400) 400 UNIT capsule Take 400 Units by mouth daily        Facility-Administered Medications Prior to Visit   Medication Dose Route Frequency Provider Last Rate Last Dose     sodium chloride (PF) 0.9% PF flush 10 mL  10 mL Intracatheter Once Darrel Pepe MD           ROS:  Constitutional: no fever, chills, or diaphoresis. Weight stable- fluctuates no more than 3-4 pounds   ENT: no visual changes, epistaxis, vertigo  Respiratory:  As above  Cards: as above  GI: no nausea, vomiting, diarrhea, melena, or hematochezia.   : + urinary frequency, no dysuria or hematuria.   Integument: Negative for bruising, rash, or pruritis.  Neuro: No headaches, syncope, focal weakness    Musculoskeletal: Negative for gout, joint stiffness/ swelling, or muscle weakness    EXAM:  Vitals: afebrile, HR 66, /76, O2 98%, Wt 184lbs  General: seated in chair, in NAD  HEENT: NC/AT, sclera anicteric, MMM    Card: RRR, 2/6 systolic murmur noted, JVP 10cm  Pulm: CTA B, no rales, ronchi, or wheezing.   Abdomen: ND, +BS, soft, NT  Extremities: no clubbing, cyanosis or edema.    Neurological: alert, conversant with normal speech, moving all extremities equally  Psych: pleasant mood and affect  Vascular: No carotid bruit noted. Radial and DP pulses bilaterally 2+    Labs:  CMP RESULTS:  Lab Results   Component Value Date     09/26/2018    POTASSIUM 3.2 (L) 09/26/2018    CHLORIDE 101 09/26/2018    CO2 30 09/26/2018    ANIONGAP 11 09/26/2018     (H) 09/26/2018    BUN 47 (H) 09/26/2018    CR 3.16 (H) 09/26/2018    GFRESTIMATED 14 (L) 09/26/2018    GFRESTBLACK 17 (L) 09/26/2018    CHICHO 9.8 09/26/2018    BILITOTAL 0.5 08/30/2018    ALBUMIN 4.1 08/30/2018    ALKPHOS 98 08/30/2018    ALT 16 08/30/2018    AST 21 08/30/2018       DATA:  Echocardiogram 9/10/2018: Mildly reduced LV function 45-50%, RV function and size normal. Mitral annular calcification noted with mildly restricted  posterior leaflet with mild to moderate MR, mild TR, moderate to severe LA enlargement, noted PFO with left to right shunt.     Assessment and Plan:   Jaja Ernandez is a pleasant 78 year old female with a past medical history including chronic diastolic and systolic heart failure who presents to [facility] clinic today for follow up. Her Echo shows her EF is much improved and a PFO is noted which was noted back in 2015. She is doing quite well. We discussed at length that this recent hospitalization may have further damaged her kidneys as her Creat seems to remain now around 3. She does have a nephrologist but notes she's not interested in dialysis. We discussed the importance then of medication compliance and she understands.     1. Chronic diastolic and systolic heart failure secondary to NICM, likely hypertensive cardiomyopathy  Stage C, Class III  ACEi/ARB: deferred due to CKD, considered Dr. Pepe recommendation though today creat is up again slightly putting her back at CKD almost stage V. Afterload of hydralazine and Imdur  BB: Carvedilol 12.5mg BID as previously instructed  Aldosterone antagonist: deferred due to CKD  SCD prophylaxis NA EF now 45%  Sleep Apnea Evaluation: doesn't feel she snores, not interested in pursuing  NSAIDS: contraindicated, discussed with patient  Remote monitoring: has cardiocom, encouraged regular use.       Follow-up: Dr. Pepe in 3 months. Will d/w him PFO (patient unaware of having) but unlikely any need for intervention.       Nancy Auguste PA-C  [facility]  Pager [phone]

## 2018-09-27 NOTE — MR AVS SNAPSHOT
After Visit Summary   2018    Jaja Ernandez    MRN: [id number]           Patient Information     Date Of Birth          1940        Visit Information        Provider Department      2018 2:00 PM Nancy Auguste PA-C [facility] PHYSICIANS HEART AT [facility]        Today's Diagnoses     Chronic systolic heart failure (H)          Care Instructions    Thank you for seeing Celestina Auguste PA-C at the [facility] Heart @ [facility];  please note the following instructions:    1.  your potassium today, take 2 tablets today and then return to your regular dose of 1 tablet twice a day    Results for JAJA ERNANDEZ (MRN [id number]) as of 2018 14:26   Ref. Range 2018 13:16   Sodium Latest Ref Range: 133 - 144 mmol/L 142   Potassium Latest Ref Range: 3.4 - 5.3 mmol/L 3.2 (L)   Chloride Latest Ref Range: 94 - 109 mmol/L 101   Carbon Dioxide Latest Ref Range: 20 - 32 mmol/L 30   Urea Nitrogen Latest Ref Range: 7 - 30 mg/dL 47 (H)   Creatinine Latest Ref Range: 0.52 - 1.04 mg/dL 3.16 (H)   GFR Estimate Latest Ref Range: >60 mL/min/1.7m2 14 (L)   GFR Estimate If Black Latest Ref Range: >60 mL/min/1.7m2 17 (L)   Calcium Latest Ref Range: 8.5 - 10.1 mg/dL 9.8   Anion Gap Latest Ref Range: 3 - 14 mmol/L 11   Glucose Latest Ref Range: 70 - 99 mg/dL 115 (H)     _______________________________________________________________________    Please limit your fluid intake to 2 L (64 ounces) daily.    2 Liters a day = 8.5 cups, or 72 ounces.  Please limit your salt intake to 2 grams a day or less.    If you gain 2# in 24 hours or 5# in one week call so we can adjust your medications as needed over the phone.      Please feel free to call me with any questions or concerns.     Kerry Fernandez RN CHFN  [facility]  Cardiology Care Coordinator-Heart Failure Clinic    *Questions and [phone].   First press #1 for the  "[facility] and then press #3 for \"Medical Questions\" to reach us Cardiology Nurses.     *On Call Cardiologist for after hours or on weekends: [phone]   option #4 and ask to speak to the on-call Cardiologist. Inform them you are a CORE/heart failure patient at the [facility].    *If you need a medication refill, please contact your pharmacy.  Please allow 3 business days for your refill to be completed.  _______________________________________________________  C.O.R.E. CLINIC Cardiomyopathy, Optimization, Rehabilitation, Education   The C.O.R.E. CLINIC is a heart failure specialty clinic within the [facility] where you will work with specialized nurse practitioners dedicated to helping patients with heart failure carefully adjust medications, receive education, and learn who and when to call if symptoms develop. They specialize in helping you better understand your condition, slow the progression of your disease, improve the length and quality of your life, help you detect future heart problems before they become life threatening, and avoid hospitalizations.  As always, thank you for trusting us with your health care needs!              Follow-ups after your visit        Your next 10 appointments already scheduled     Nov 01, 2018 12:30 PM CDT   [facility] with Darrel Pepe MD   [facility]    [address] [place] [id number] [phone]              Who to contact     If you have questions or need follow up information about today's clinic visit or your schedule please contact [facility] PHYSICIANS HEART AT [facility] directly at [phone].  Normal or non-critical lab and imaging results will be communicated to you by MyChart, letter or phone within 4 business days after the clinic has received the results. If you do not hear from us within 7 days, please contact the clinic " through Pressglue or phone. If you have a critical or abnormal lab result, we will notify you by phone as soon as possible.  Submit refill requests through Pressglue or call your pharmacy and they will forward the refill request to us. Please allow 3 business days for your refill to be completed.          Additional Information About Your Visit        Urlisthart Information     Pressglue gives you secure access to your electronic health record. If you see a primary care provider, you can also send messages to your care team and make appointments. If you have questions, please call your primary care clinic.  If you do not have a primary care provider, please call [phone] and they will assist you.        Care EveryWhere ID     This is your Care EveryWhere ID. This could be used by other organizations to access your [place] medical records  [id number]        Your Vitals Were     Pulse Pulse Oximetry BMI (Body Mass Index)             67 98% 30.62 kg/m2          Blood Pressure from Last 3 Encounters:   09/27/18 126/76   08/30/18 147/79   07/19/18 103/68    Weight from Last 3 Encounters:   09/27/18 83.5 kg (184 lb)   08/30/18 83.6 kg (184 lb 6.4 oz)   07/19/18 83.9 kg (185 lb)              We Performed the Following     Follow-Up with [facility] Clinic        Primary Care Provider Office Phone # Fax #    Edi Ribeiro [phone] [address] [facility] [address]        Equal Access to Services     NADYA JOHNSON : Hadii harshal ku hadasho Sodavidali, waaxda luqadaha, qaybta kaalmada adejaysonyada, jaren childress. So [facility] [phone].    ATENCIÓN: Si habla español, tiene a richter disposición servicios gratuitos de asistencia lingüística. Llame al [phone].    We comply with applicable federal civil rights laws and [place] laws. We do not discriminate on the basis of race, color, national origin, age, disability, sex, sexual orientation, or gender identity.            Thank you!      Thank you for choosing [facility] PHYSICIANS HEART AT [facility]  for your care. Our goal is always to provide you with excellent care. Hearing back from our patients is one way we can continue to improve our services. Please take a few minutes to complete the written survey that you may receive in the mail after your visit with us. Thank you!             Your Updated Medication List - Protect others around you: Learn how to safely use, store and throw away your medicines at www.disposemymeds.org.          This list is accurate as of 9/27/18  2:55 PM.  Always use your most recent med list.                   Brand Name Dispense Instructions for use Diagnosis    ACE/ARB/ARNI NOT PRESCRIBED (INTENTIONAL)      Please choose reason not prescribed, below    Chronic systolic heart failure (H), CKD (chronic kidney disease) stage 4, GFR 15-29 ml/min (H)       amLODIPine 10 MG tablet    NORVASC    90 tablet    Take 1 tablet (10 mg) by mouth At Bedtime    HTN (hypertension)       aspirin 81 MG tablet      Take 81 mg by mouth daily        atorvastatin 20 MG tablet    LIPITOR    30 tablet    Take 1 tablet (20 mg) by mouth daily    Acute pulmonary edema with congestive heart failure (H)       BC HEADACHE POWDER PO      Take 1 powder on the tongue every 6 hours with water as needed for headache        calcium carbonate 600 mg-vitamin D 400 units 600-400 MG-UNIT per tablet    CALTRATE     Take 1 tablet by mouth daily        carvedilol 12.5 MG tablet    COREG    60 tablet    Take 1 tablet (12.5 mg) by mouth 2 times daily (with meals)        CENTRUM SILVER per tablet      Take 1 tablet by mouth daily        docusate sodium 100 MG capsule    COLACE    60 capsule    Take 1 capsule (100 mg) by mouth 2 times daily as needed for constipation    Slow transit constipation       ferrous sulfate 325 (65 Fe) MG tablet    IRON    100 tablet    Take 1 tablet (325 mg) by mouth 2 times daily    Anemia in stage 3 chronic kidney  disease       furosemide 40 MG tablet    LASIX    90 tablet    Take 1 tablet (40 mg) by mouth 2 times daily    Acute pulmonary edema with congestive heart failure (H)       garlic 150 MG Tabs tablet      Take 150 mg by mouth daily        hydrALAZINE 50 MG tablet    APRESOLINE    270 tablet    Take 1 tablet (50 mg) by mouth 3 times daily    Acute pulmonary edema with congestive heart failure (H)       isosorbide mononitrate 60 MG 24 hr tablet    IMDUR    180 tablet    Take 1 tablet (60 mg) by mouth 2 times daily    Ischemic cardiomyopathy       potassium chloride SA 20 MEQ CR tablet    K-DUR/KLOR-CON M    60 tablet    Take 1 tablet (20 mEq) by mouth 2 times daily    Chronic systolic heart failure (H)       VITAMIN C PO      Take 500 mg by mouth daily        vitamin E 400 UNIT capsule   Generic drug:  vitamin E      Take 400 Units by mouth daily

## 2018-09-27 NOTE — PATIENT INSTRUCTIONS
"Thank you for seeing Celestina Auguste PA-C at the [facility] Heart @ [facility] [place] [facility];  please note the following instructions:    1.  your potassium today, take 2 tablets today and then return to your regular dose of 1 tablet twice a day    Results for SAMI ECKERT (MRN [id number]) as of 2018 14:26   Ref. Range 2018 13:16   Sodium Latest Ref Range: 133 - 144 mmol/L 142   Potassium Latest Ref Range: 3.4 - 5.3 mmol/L 3.2 (L)   Chloride Latest Ref Range: 94 - 109 mmol/L 101   Carbon Dioxide Latest Ref Range: 20 - 32 mmol/L 30   Urea Nitrogen Latest Ref Range: 7 - 30 mg/dL 47 (H)   Creatinine Latest Ref Range: 0.52 - 1.04 mg/dL 3.16 (H)   GFR Estimate Latest Ref Range: >60 mL/min/1.7m2 14 (L)   GFR Estimate If Black Latest Ref Range: >60 mL/min/1.7m2 17 (L)   Calcium Latest Ref Range: 8.5 - 10.1 mg/dL 9.8   Anion Gap Latest Ref Range: 3 - 14 mmol/L 11   Glucose Latest Ref Range: 70 - 99 mg/dL 115 (H)     _______________________________________________________________________    Please limit your fluid intake to 2 L (64 ounces) daily.    2 Liters a day = 8.5 cups, or 72 ounces.  Please limit your salt intake to 2 grams a day or less.    If you gain 2# in 24 hours or 5# in one week call so we can adjust your medications as needed over the phone.      Please feel free to call me with any questions or concerns.     Kerry Fernandez RN CHFN  [facility]  Cardiology Care Coordinator-Heart Failure Clinic    *Questions and [phone].   First press #1 for the University and then press #3 for \"Medical Questions\" to reach us Cardiology Nurses.     *On Call Cardiologist for after hours or on weekends: [phone]   option #4 and ask to speak to the on-call Cardiologist. Inform them you are a CORE/heart failure patient at the [facility].    *If you need a medication refill, please contact your pharmacy.  Please allow 3 business days for your refill to be " completed.  _______________________________________________________  C.O.R.E. CLINIC Cardiomyopathy, Optimization, Rehabilitation, Education   The C.O.R.E. CLINIC is a heart failure specialty clinic within the [facility] Heart Clinic where you will work with specialized nurse practitioners dedicated to helping patients with heart failure carefully adjust medications, receive education, and learn who and when to call if symptoms develop. They specialize in helping you better understand your condition, slow the progression of your disease, improve the length and quality of your life, help you detect future heart problems before they become life threatening, and avoid hospitalizations.  As always, thank you for trusting us with your health care needs!

## 2018-09-27 NOTE — NURSING NOTE
"Chief Complaint   Patient presents with     Heart Failure     reason for visit: Return CORE; 78 year old female with systolic heart failure EF 24% presents for follow up with labs prior. Per patient no concerns at this time       Initial /76 (BP Location: Right arm, Patient Position: Sitting, Cuff Size: Adult Large)  Pulse 67  Wt 83.5 kg (184 lb)  SpO2 98%  BMI 30.62 kg/m2 Estimated body mass index is 30.62 kg/(m^2) as calculated from the following:    Height as of 7/10/18: 1.651 m (5' 5\").    Weight as of this encounter: 83.5 kg (184 lb)..  BP completed using cuff size: large    Jackeline Ferrara L.P.N.        "

## 2018-09-27 NOTE — PROGRESS NOTES
CARDIOLOGY CLINIC  Jaja Ernandez is a 78 year old female with chronic systolic heart failure, breast cancer in remission and hypertension who [place] [facility] [place].     I met her a month ago at which time she had improving hypervolemia. She had hospitalization prior for hypertensive urgency, decompensated heart failure and acute renal failure due to stopping her medication for several months. Her Echo at that time showed drop in EF from 50% previous to 24% with worsening MR and TR. Since seeing me she established care with Dr. Pepe a month ago. He increased her carvedilol to 12.5mg BID but she notes today she hadn't increased it until today as she used up what she had left. He ordered for repeat Echo which does show improvement in her LVEF. She has been on Lasix 40mg BID with stable weight. She can walk to her mailbox without dyspnea (at least  feet). She is sleeping well, her energy is improved. No chest pain, dizziness, cough, palpitations, PND, orthopnea or bloating.     She takes her medications and understands the importance now. She does note she was out of her potassium for a few days, is picking it up today. She is on CardioCom with controlled blood pressures though doesn't use daily for weights either.     PAST MEDICAL HISTORY:  Hypertension  CKD stage IV  Cardiomyopathy- EF in 2015 35%, recovered to 49% as of June 2015, then down to 20-30% June, now back to 45%    FAMILY HISTORY:  Family History   Problem Relation Age of Onset     Breast Cancer Mother 50     Cancer Mother      Asthma Other      Unknown/Adopted Father      Breast Cancer Sister 70     Cancer Sister      Hypertension Daughter      Diabetes No family hx of      Cerebrovascular Disease No family hx of      Thyroid Disease No family hx of      Glaucoma No family hx of      Macular Degeneration No family hx of      SOCIAL HISTORY: , lives alone in apartment, daughter here with her today. Never a smoker.     CURRENT  MEDICATIONS:  Outpatient Medications Prior to Visit   Medication Sig Dispense Refill     ACE/ARB/ARNI NOT PRESCRIBED, INTENTIONAL, Please choose reason not prescribed, below       amLODIPine (NORVASC) 10 MG tablet Take 1 tablet (10 mg) by mouth At Bedtime 90 tablet 3     Ascorbic Acid (VITAMIN C PO) Take 500 mg by mouth daily        aspirin 81 MG tablet Take 81 mg by mouth daily        Aspirin-Salicylamide-Caffeine (BC HEADACHE POWDER PO) Take 1 powder on the tongue every 6 hours with water as needed for headache       atorvastatin (LIPITOR) 20 MG tablet Take 1 tablet (20 mg) by mouth daily 30 tablet 3     calcium-vitamin D (CALTRATE) 600-400 MG-UNIT per tablet Take 1 tablet by mouth daily       carvedilol (COREG) 12.5 MG tablet Take 1 tablet (12.5 mg) by mouth 2 times daily (with meals) 60 tablet 5     docusate sodium (COLACE) 100 MG capsule Take 1 capsule (100 mg) by mouth 2 times daily as needed for constipation 60 capsule 0     ferrous sulfate (IRON) 325 (65 Fe) MG tablet Take 1 tablet (325 mg) by mouth 2 times daily 100 tablet 0     furosemide (LASIX) 40 MG tablet Take 1 tablet (40 mg) by mouth 2 times daily 90 tablet 3     garlic 150 MG TABS Take 150 mg by mouth daily       hydrALAZINE (APRESOLINE) 50 MG tablet Take 1 tablet (50 mg) by mouth 3 times daily 270 tablet 3     isosorbide mononitrate (IMDUR) 60 MG 24 hr tablet Take 1 tablet (60 mg) by mouth 2 times daily 180 tablet 3     Multiple Vitamins-Minerals (CENTRUM SILVER) per tablet Take 1 tablet by mouth daily       potassium chloride SA (K-DUR/KLOR-CON M) 20 MEQ CR tablet Take 1 tablet (20 mEq) by mouth 2 times daily 60 tablet 3     vitamin E (VITAMIN E-400) 400 UNIT capsule Take 400 Units by mouth daily        Facility-Administered Medications Prior to Visit   Medication Dose Route Frequency Provider Last Rate Last Dose     sodium chloride (PF) 0.9% PF flush 10 mL  10 mL Intracatheter Once Darrel Pepe MD           ROS:  Constitutional: no  fever, chills, or diaphoresis. Weight stable- fluctuates no more than 3-4 pounds   ENT: no visual changes, epistaxis, vertigo  Respiratory:  As above  Cards: as above  GI: no nausea, vomiting, diarrhea, melena, or hematochezia.   : + urinary frequency, no dysuria or hematuria.   Integument: Negative for bruising, rash, or pruritis.  Neuro: No headaches, syncope, focal weakness    Musculoskeletal: Negative for gout, joint stiffness/ swelling, or muscle weakness    EXAM:  Vitals: afebrile, HR 66, /76, O2 98%, Wt 184lbs  General: seated in chair, in NAD  HEENT: NC/AT, sclera anicteric, MMM    Card: RRR, 2/6 systolic murmur noted, JVP 10cm  Pulm: CTA B, no rales, ronchi, or wheezing.   Abdomen: ND, +BS, soft, NT  Extremities: no clubbing, cyanosis or edema.    Neurological: alert, conversant with normal speech, moving all extremities equally  Psych: pleasant mood and affect  Vascular: No carotid bruit noted. Radial and DP pulses bilaterally 2+    Labs:  CMP RESULTS:  Lab Results   Component Value Date     09/26/2018    POTASSIUM 3.2 (L) 09/26/2018    CHLORIDE 101 09/26/2018    CO2 30 09/26/2018    ANIONGAP 11 09/26/2018     (H) 09/26/2018    BUN 47 (H) 09/26/2018    CR 3.16 (H) 09/26/2018    GFRESTIMATED 14 (L) 09/26/2018    GFRESTBLACK 17 (L) 09/26/2018    CHICHO 9.8 09/26/2018    BILITOTAL 0.5 08/30/2018    ALBUMIN 4.1 08/30/2018    ALKPHOS 98 08/30/2018    ALT 16 08/30/2018    AST 21 08/30/2018       DATA:  Echocardiogram 9/10/2018: Mildly reduced LV function 45-50%, RV function and size normal. Mitral annular calcification noted with mildly restricted posterior leaflet with mild to moderate MR, mild TR, moderate to severe LA enlargement, noted PFO with left to right shunt.     Assessment and Plan:   Jaja Ernandez is a pleasant 78 year old female with a past medical history including chronic diastolic and systolic heart failure who presents to [facility] clinic today for follow up. Her Echo shows her EF  is much improved and a PFO is noted which was noted back in 2015. She is doing quite well. We discussed at length that this recent hospitalization may have further damaged her kidneys as her Creat seems to remain now around 3. She does have a nephrologist but notes she's not interested in dialysis. We discussed the importance then of medication compliance and she understands.     1. Chronic diastolic and systolic heart failure secondary to NICM, likely hypertensive cardiomyopathy  Stage C, Class III  ACEi/ARB: deferred due to CKD, considered Dr. Pepe recommendation though today creat is up again slightly putting her back at CKD almost stage V. Afterload of hydralazine and Imdur  BB: Carvedilol 12.5mg BID as previously instructed  Aldosterone antagonist: deferred due to CKD  SCD prophylaxis NA EF now 45%  Sleep Apnea Evaluation: doesn't feel she snores, not interested in pursuing  NSAIDS: contraindicated, discussed with patient  Remote monitoring: has cardiocom, encouraged regular use.       Follow-up: Dr. Pepe in 3 months. Will d/w him PFO (patient unaware of having) but unlikely any need for intervention.       Nancy Auguste PA-C  [facility]  Pager [phone]

## 2018-09-27 NOTE — PROGRESS NOTES
TTE 9/10/18 shows improved LV function (LVEF=45-50%) , improved MR (mild-moderate), and small PFO (also noted in 2015.) Results released to patient on MyChart.

## 2018-10-29 ENCOUNTER — CARE COORDINATION (OUTPATIENT)
Dept: CARDIOLOGY | Facility: CLINIC | Age: 78
End: 2018-10-29

## 2018-10-29 NOTE — PROGRESS NOTES
Pt's last weigh in to CardioCom was 10/7. Called pt to check symptoms and weight. Reached MEAGHAN, MAUREEN requesting a return call. Nadeen Charles, RN [facility] Care Coordinator

## 2018-11-01 ENCOUNTER — OFFICE VISIT (OUTPATIENT)
Dept: CARDIOLOGY | Facility: CLINIC | Age: 78
End: 2018-11-01
Payer: MEDICARE

## 2018-11-01 VITALS
WEIGHT: 187.9 LBS | OXYGEN SATURATION: 95 % | BODY MASS INDEX: 31.27 KG/M2 | DIASTOLIC BLOOD PRESSURE: 82 MMHG | SYSTOLIC BLOOD PRESSURE: 122 MMHG | HEART RATE: 78 BPM

## 2018-11-01 DIAGNOSIS — I36.1 TRICUSPID VALVE REGURGITATION, NONRHEUMATIC: ICD-10-CM

## 2018-11-01 DIAGNOSIS — I10 BENIGN ESSENTIAL HYPERTENSION: ICD-10-CM

## 2018-11-01 DIAGNOSIS — I25.10 CORONARY ARTERY DISEASE INVOLVING NATIVE CORONARY ARTERY OF NATIVE HEART WITHOUT ANGINA PECTORIS: ICD-10-CM

## 2018-11-01 DIAGNOSIS — I27.22 PULMONARY HYPERTENSION DUE TO LEFT HEART DISEASE (H): ICD-10-CM

## 2018-11-01 DIAGNOSIS — I50.22 CHRONIC SYSTOLIC HEART FAILURE (H): ICD-10-CM

## 2018-11-01 DIAGNOSIS — I34.0 NON-RHEUMATIC MITRAL REGURGITATION: ICD-10-CM

## 2018-11-01 DIAGNOSIS — N18.4 CKD (CHRONIC KIDNEY DISEASE) STAGE 4, GFR 15-29 ML/MIN (H): ICD-10-CM

## 2018-11-01 PROCEDURE — 99215 OFFICE O/P EST HI 40 MIN: CPT | Performed by: INTERNAL MEDICINE

## 2018-11-01 RX ORDER — SPIRONOLACTONE 25 MG/1
12.5 TABLET ORAL DAILY
Qty: 15 TABLET | Refills: 1 | Status: SHIPPED | OUTPATIENT
Start: 2018-11-01 | End: 2018-11-14

## 2018-11-01 ASSESSMENT — PAIN SCALES - GENERAL: PAINLEVEL: NO PAIN (0)

## 2018-11-01 NOTE — NURSING NOTE
Jaja Ernandez's goals for this visit include: Return  She requests these members of her care team be copied on today's visit information:     PCP: Edi Ribeiro    Referring Provider:  Nancy Auguste PA-C  [address] [place] [id number]    /80 (BP Location: Left arm, Patient Position: Sitting, Cuff Size: Adult Large)  Pulse 78  Wt 85.2 kg (187 lb 14.4 oz)  SpO2 95%  BMI 31.27 kg/m2    Do you need any medication refills at today's visit? DEXTER Rangel on 11/1/2018 at 12:45 PM

## 2018-11-01 NOTE — MR AVS SNAPSHOT
After Visit Summary   11/1/2018    Jaja Ernandez    MRN: [id number]           Patient Information     Date Of Birth          1940        Visit Information        Provider Department      11/1/2018 12:30 PM Darrel Pepe MD [facility] CARDIOV[facility]      Today's Diagnoses     Chronic systolic heart failure (H)        CKD (chronic kidney disease) stage 4, GFR 15-29 ml/min (H)        Non-obstructive CAD without angina        Non-rheumatic mitral regurgitation        Tricuspid valve regurgitation, nonrheumatic        Pulmonary hypertension due to left heart disease (H)        Benign essential hypertension          Care Instructions    1. Start spironolactone (Aldactone) 12.5 mg daily.  2. Stop potassium.   3. Lab work (kidney function/potassium) next week.   4. Follow up with [facility] clinic in 1 month.  5. Follow up with Dr. Pepe in 3 months.     Please call [phone] and ask for Cardiology with any new symptoms, questions, or concerns.     For urgent after hour care, please call the [facility] at [phone], or the [facility] at [phone], and ask to speak to the cardiologist on call.    When to Call Your Doctor  Call your doctor if you have any of the following:  Changed or worsened chest pain.  Chest pain that started within the past 2 months and is now more severe.   Chest pain that happens 3 or more times per day.   Chest pain that suddenly becomes more frequent or severe, lasts longer, or is brought on by less exertion than before.   Chest pain that occurs at rest, with no obvious exertion or stress. It might wake you from sleep.  Call 911 or other emergency services if you have CAD that has been diagnosed by a doctor and you have chest pain that doesn't go away after using your home treatment plan for angina.  When in Doubt:  If you aren't sure if your symptoms are serious or decide not to call 911, call our  [facility] at [phone] or the [facility] at [phone] and ask to speak to the cardiologist on call. They can help you decide if your pain is an emergency or not.              Follow-ups after your visit        Follow-up notes from your care team     Return in about 3 months (around 2/1/2019).      Future tests that were ordered for you today     Open Future Orders        Priority Expected Expires Ordered    Basic metabolic panel Routine 11/5/2018 11/1/2019 11/1/2018            Who to contact     If you have questions or need follow up information about today's clinic visit or your schedule please contact [facility] directly at [phone].  Normal or non-critical lab and imaging results will be communicated to you by real trendshart, letter or phone within 4 business days after the clinic has received the results. If you do not hear from us within 7 days, please contact the clinic through [email or web address] or phone. If you have a critical or abnormal lab result, we will notify you by phone as soon as possible.  Submit refill requests through [employer] or call your pharmacy and they will forward the refill request to us. Please allow 3 business days for your refill to be completed.          Additional Information About Your Visit        real trendsharAppSlingr Information     [employer] gives you secure access to your electronic health record. If you see a primary care provider, you can also send messages to your care team and make appointments. If you have questions, please call your primary care clinic.  If you do not have a primary care provider, please call [phone] and they will assist you.      [employer] is an electronic gateway that provides easy, online access to your medical records. With [employer], you can request a clinic appointment, read your test results, renew a prescription or communicate with your care team.     To access your existing account, please contact your  [facility] or call [phone] for assistance.        Care EveryWhere ID     This is your Care EveryWhere ID. This could be used by other organizations to access your [facility] medical records  [id number]        Your Vitals Were     Pulse Pulse Oximetry BMI (Body Mass Index)             78 95% 31.27 kg/m2          Blood Pressure from Last 3 Encounters:   11/01/18 122/82   09/27/18 126/76   08/30/18 147/79    Weight from Last 3 Encounters:   11/01/18 85.2 kg (187 lb 14.4 oz)   09/27/18 83.5 kg (184 lb)   08/30/18 83.6 kg (184 lb 6.4 oz)              We Performed the Following     Follow-Up with Cardiologist          Today's Medication Changes          These changes are accurate as of 11/1/18  1:25 PM.  If you have any questions, ask your nurse or doctor.               Start taking these medicines.        Dose/Directions    spironolactone 25 MG tablet   Commonly known as:  ALDACTONE   Used for:  Chronic systolic heart failure (H)   Started by:  Darrel Pepe MD        Dose:  12.5 mg   Take 0.5 tablets (12.5 mg) by mouth daily   Quantity:  15 tablet   Refills:  1         Stop taking these medicines if you haven't already. Please contact your care team if you have questions.     potassium chloride SA 20 MEQ CR tablet   Commonly known as:  K-DUR/KLOR-CON M   Stopped by:  Darrel Pepe MD                Where to get your medicines      These medications were sent to [facility] [address] [place] [id number]     Phone:  [phone]     spironolactone 25 MG tablet                Primary Care Provider Office Phone # Fax #    Edi Ribeiro [phone]       [address] [facility] [address]        Equal Access to Services     NADYA JOHNSON AH: Sri alanis Sodontrell, waaxda luqadaha, qaybta kaalmada adeto, jaren childress. So [facility]  [phone].    ATENCIÓN: Si yasmani alfonso, tiene a richter disposición servicios gratuitos de asistencia lingüística. Tona jacob [phone].    We comply with applicable federal civil rights laws and [place] laws. We do not discriminate on the basis of race, color, national origin, age, disability, sex, sexual orientation, or gender identity.            Thank you!     Thank you for choosing [facility]  for your care. Our goal is always to provide you with excellent care. Hearing back from our patients is one way we can continue to improve our services. Please take a few minutes to complete the written survey that you may receive in the mail after your visit with us. Thank you!             Your Updated Medication List - Protect others around you: Learn how to safely use, store and throw away your medicines at www.disposemymeds.org.          This list is accurate as of 11/1/18  1:25 PM.  Always use your most recent med list.                   Brand Name Dispense Instructions for use Diagnosis    ACE/ARB/ARNI NOT PRESCRIBED (INTENTIONAL)      Please choose reason not prescribed, below    Chronic systolic heart failure (H), CKD (chronic kidney disease) stage 4, GFR 15-29 ml/min (H)       amLODIPine 10 MG tablet    NORVASC    90 tablet    Take 1 tablet (10 mg) by mouth At Bedtime    HTN (hypertension)       aspirin 81 MG tablet      Take 81 mg by mouth daily        atorvastatin 20 MG tablet    LIPITOR    30 tablet    Take 1 tablet (20 mg) by mouth daily    Acute pulmonary edema with congestive heart failure (H)       BC HEADACHE POWDER PO      Take 1 powder on the tongue every 6 hours with water as needed for headache        calcium carbonate 600 mg-vitamin D 400 units 600-400 MG-UNIT per tablet    CALTRATE     Take 1 tablet by mouth daily        carvedilol 12.5 MG tablet    COREG    60 tablet    Take 1 tablet (12.5 mg) by mouth 2 times daily (with meals)        CENTRUM SILVER per tablet      Take 1  tablet by mouth daily        docusate sodium 100 MG capsule    COLACE    60 capsule    Take 1 capsule (100 mg) by mouth 2 times daily as needed for constipation    Slow transit constipation       ferrous sulfate 325 (65 Fe) MG tablet    IRON    100 tablet    Take 1 tablet (325 mg) by mouth 2 times daily    Anemia in stage 3 chronic kidney disease (H)       furosemide 40 MG tablet    LASIX    90 tablet    Take 1 tablet (40 mg) by mouth 2 times daily    Acute pulmonary edema with congestive heart failure (H)       garlic 150 MG Tabs tablet      Take 150 mg by mouth daily        hydrALAZINE 50 MG tablet    APRESOLINE    270 tablet    Take 1 tablet (50 mg) by mouth 3 times daily    Acute pulmonary edema with congestive heart failure (H)       isosorbide mononitrate 60 MG 24 hr tablet    IMDUR    180 tablet    Take 1 tablet (60 mg) by mouth 2 times daily    Ischemic cardiomyopathy       spironolactone 25 MG tablet    ALDACTONE    15 tablet    Take 0.5 tablets (12.5 mg) by mouth daily    Chronic systolic heart failure (H)       VITAMIN C PO      Take 500 mg by mouth daily        vitamin E 400 UNIT capsule   Generic drug:  vitamin E      Take 400 Units by mouth daily

## 2018-11-01 NOTE — PROGRESS NOTES
"Chief complaint: Follow up of chronic systolic HF    HPI:   Jaja Ernandez is a 78 year old woman with chronic systolic HF due to NICM LVEF=24% (TTE 6/29/18), HTN, DM2, CKD4, and non-obstructive CAD who presents for follow up of HF.     She established care with me 7/2018 after her inpatient admission. My note from that time: \"She was admitted at [facility] 6/29-7/2 for decompensated HF (exertional dyspnea/cough), severe HTN (190s/130s), and TATIANA-on-CKD (Cr 1.8 > 3.5) in the setting of self-discontinuation of all of her antihypertensive meds for 3 months. She was treated with diuretics and restarted on neurohormonal HF therapy/antihypertensives. TTE in-house was notable for fall in her LVEF (24% from 50% in 2015), elevated PA pressure, and worsening function MR/TR.  She was discharged home and established care in the [facility] clinic, where she has been followed with gradual up-titration of her medical therapy. She reports significant improvement in her functional capacity, and reports that she was at least once able to walk up 2 flights (32 steps), and was only short of breath at the top.\"       Since her last visit, she has had repeat TTE which shows improved LVEF to 45-50%. She reports her exertional capacity is unchanged from last visit, but admits she has not really tested herself since that time.     She reports good adherence to her medical therapy and states that she is tolerating her current regimen well.     She denies any chest pain, dyspnea above baseline, PND, orthopnea, peripheral edema, palpitations, lightheadedness or syncope.     PAST MEDICAL HISTORY:  Past Medical History:   Diagnosis Date     Cataract      Chronic renal failure, stage 3 (moderate) (H)      Congestive heart failure, unspecified      Hypertension      Non-obstructive CAD without angina 5/19/2015     Non-obstructive CAD without angina 5/19/2015     Nonischemic cardiomyopathy (H) 8/25/2015     Other nonspecific abnormal cardiovascular system " function study 5/15/2015     Systolic CHF (H)     LVEF 35-40% 3/2015       CURRENT MEDICATIONS:  Current Outpatient Prescriptions   Medication Sig Dispense Refill     ACE/ARB/ARNI NOT PRESCRIBED, INTENTIONAL, Please choose reason not prescribed, below       amLODIPine (NORVASC) 10 MG tablet Take 1 tablet (10 mg) by mouth At Bedtime 90 tablet 3     Ascorbic Acid (VITAMIN C PO) Take 500 mg by mouth daily        aspirin 81 MG tablet Take 81 mg by mouth daily        Aspirin-Salicylamide-Caffeine (BC HEADACHE POWDER PO) Take 1 powder on the tongue every 6 hours with water as needed for headache       atorvastatin (LIPITOR) 20 MG tablet Take 1 tablet (20 mg) by mouth daily 30 tablet 3     calcium-vitamin D (CALTRATE) 600-400 MG-UNIT per tablet Take 1 tablet by mouth daily       carvedilol (COREG) 12.5 MG tablet Take 1 tablet (12.5 mg) by mouth 2 times daily (with meals) 60 tablet 5     docusate sodium (COLACE) 100 MG capsule Take 1 capsule (100 mg) by mouth 2 times daily as needed for constipation 60 capsule 0     ferrous sulfate (IRON) 325 (65 Fe) MG tablet Take 1 tablet (325 mg) by mouth 2 times daily 100 tablet 0     furosemide (LASIX) 40 MG tablet Take 1 tablet (40 mg) by mouth 2 times daily 90 tablet 3     garlic 150 MG TABS Take 150 mg by mouth daily       hydrALAZINE (APRESOLINE) 50 MG tablet Take 1 tablet (50 mg) by mouth 3 times daily 270 tablet 3     isosorbide mononitrate (IMDUR) 60 MG 24 hr tablet Take 1 tablet (60 mg) by mouth 2 times daily 180 tablet 3     Multiple Vitamins-Minerals (CENTRUM SILVER) per tablet Take 1 tablet by mouth daily       potassium chloride SA (K-DUR/KLOR-CON M) 20 MEQ CR tablet Take 1 tablet (20 mEq) by mouth 2 times daily 60 tablet 3     vitamin E (VITAMIN E-400) 400 UNIT capsule Take 400 Units by mouth daily          PAST SURGICAL HISTORY:  Past Surgical History:   Procedure Laterality Date     ANGIOGRAM      3/2015     BRAIN TUMOR RESECTION  7-8-1997    Benign brain tumor      csection       HYSTERECTOMY TOTAL ABDOMINAL      benign condition       ALLERGIES:     Allergies   Allergen Reactions     Demerol [Meperidine] Nausea and Vomiting       FAMILY HISTORY:  Family History   Problem Relation Age of Onset     Breast Cancer Mother 50     Cancer Mother      Asthma Other      Unknown/Adopted Father      Breast Cancer Sister 70     Cancer Sister      Hypertension Daughter      Diabetes No family hx of      Cerebrovascular Disease No family hx of      Thyroid Disease No family hx of      Glaucoma No family hx of      Macular Degeneration No family hx of      SOCIAL HISTORY:  Social History   Substance Use Topics     Smoking status: Never Smoker     Smokeless tobacco: Never Used     Alcohol use No       ROS:   A comprehensive 14 point review of systems is negative other than as mentioned in HPI.    Exam:   /80 (BP Location: Left arm, Patient Position: Sitting, Cuff Size: Adult Large)  Pulse 78  Wt 85.2 kg (187 lb 14.4 oz)  SpO2 95%  BMI 31.27 kg/m2  GENERAL APPEARANCE: healthy, alert and no distress  EYES: no icterus, no xanthelasmas  ENT: normal palate, mucosa moist, no central cyanosis  NECK:  JVP pre-V 8 cmH2O with large V-wave  RESPIRATORY: lungs clear to auscultation - no rales, rhonchi or wheezes, no use of accessory muscles, no retractions, respirations are unlabored, normal respiratory rate  CARDIOVASCULAR: regular rhythm, +3/6 LLSB holosystolic murmur. No s3/s4.   GI: soft, non tender, without hepatosplenomegaly, no masses palpable, bowel sounds normal, aorta not enlarged by palpation, no abdominal bruits  EXTREMITIES: peripheral pulses normal, no edema, no bruits  NEURO: alert and oriented to person/place/time, normal speech, gait and affect  VASC: Radial pulses 2+ bilaterally.  SKIN: no ecchymoses, no rashes.  PSYCH: cooperative, affect appropriate.     Labs:  Reviewed. Of note:  BMP today (8/30/18): Cr 2.97 (<3.6<3.0<3.3 (6/29); 1.89 on  10/27/16.)      Testing/Procedures:    I personally visualized and interpreted:  TTE 9/10/18:  Normal LV size with mildly reduced LV function, LVEF=45-50%. GLS -12%.  Normal RV size/function.  Mild-to-moderate MR. Mild TR. PASP~34 mmHg. RAP~3 mmHg.  Small PFO (seen on prior TTE from 2015.)  Compared with 6/29/18: LVEF has improved (previously 24%), MR/TR have decreased, and PA/RA pressures have improved.     Assessment and Plan:   78 year old woman with chronic systolic HF due to NICM LVEF=24% (TTE 6/29/18), HTN, DM2, CKD5, and non-obstructive CAD, now with improving LVEF on neurohormonal therapy.     #Chronic systolic HF due to NICM (likely hypertensive cardiomyopathy), with improving LVEF:  LVEF=24% on 6/29 (from 50% in 2015), now improved to 45-50% on TTE 9/2018.   NYHA class II-III, stage C. Clinically euvolemic and well-perfused on today's exam.   -Beta blocker: continue carvedilol 12.5 mg BID (would increase next [facility] visit)   -ACEI/ARB/ARNI: relatively contraindicated (CKD5, TATIANA 6/29/18.) Will hold off for the present.   -yesenia antagonist: start spironolactone 12.5 mg daily (closely monitor renal function/potassium); stop KCl  Opted for yesenia antagonist given previous suspicion for hyperaldosteronism and persistent relative hypokalemia even with supplemental potassium.   -afterload reduction: continue hydralazine/ISMN  -SCD prophylaxis: not indicated (LVEF>35%)  -CRT: not a candidate (narrow QRS)    #Secondary/functional mitral regurgitation, improved:  #Secondary/functional tricuspid regurgitaiton, improved:  #Pulmonary venous hypertension (WHO Group II)  All diagnosed in the setting of acute decompensated HF on her 6/29/18 TTE. All have improved with restoration of euvolemia and improvement in LVEF.   -manage HF as above    #HTN with nephropathy and cardiomyopathy:  Control improved. Have added spironolactone as above.     #CKD5 due to hypertension/DM:  With no measured Cr/GFR between 2016 and HF admission on  6/29/18, but with persistent Cr~3-3.2 since that time which appears to be her new baseline.   -cautiously starting spironolactone as above  -closely monitor renal function, avoid nephrotoxins  -Nephrology follow up as scheduled    #Non-obstructive CAD w/o angina (based on cath from 2015): continue ASA 81 mg daily, atorvastatin 20 mg daily    The patient states understanding and is agreeable with plan.     Darrel Pepe MD  Cardiology    CC  [facility]

## 2018-11-08 DIAGNOSIS — N18.4 CKD (CHRONIC KIDNEY DISEASE) STAGE 4, GFR 15-29 ML/MIN (H): ICD-10-CM

## 2018-11-08 DIAGNOSIS — I50.22 CHRONIC SYSTOLIC HEART FAILURE (H): ICD-10-CM

## 2018-11-08 LAB
ANION GAP SERPL CALCULATED.3IONS-SCNC: 9 MMOL/L (ref 3–14)
BUN SERPL-MCNC: 50 MG/DL (ref 7–30)
CALCIUM SERPL-MCNC: 9.4 MG/DL (ref 8.5–10.1)
CHLORIDE SERPL-SCNC: 103 MMOL/L (ref 94–109)
CO2 SERPL-SCNC: 30 MMOL/L (ref 20–32)
CREAT SERPL-MCNC: 3.67 MG/DL (ref 0.52–1.04)
GFR SERPL CREATININE-BSD FRML MDRD: 12 ML/MIN/1.7M2
GLUCOSE SERPL-MCNC: 126 MG/DL (ref 70–99)
POTASSIUM SERPL-SCNC: 3.1 MMOL/L (ref 3.4–5.3)
SODIUM SERPL-SCNC: 142 MMOL/L (ref 133–144)

## 2018-11-08 PROCEDURE — 36415 COLL VENOUS BLD VENIPUNCTURE: CPT | Performed by: INTERNAL MEDICINE

## 2018-11-08 PROCEDURE — 80048 BASIC METABOLIC PNL TOTAL CA: CPT | Performed by: INTERNAL MEDICINE

## 2018-11-09 NOTE — PROGRESS NOTES
Pauly,    Repeat BMP shows slightly worse kidney function on spironolactone and potassium is still low. Let's have her restart taking potassium. I've forwarded this result to her nephrologist, Dr. Otoole and will discuss with her whether to stop spironolactone. Let's have her hold spironolactone for right now.    Thanks,  Darrel Pepe MD  Cardiology

## 2018-11-14 ENCOUNTER — TELEPHONE (OUTPATIENT)
Dept: NEPHROLOGY | Facility: CLINIC | Age: 78
End: 2018-11-14

## 2018-11-14 RX ORDER — POTASSIUM CHLORIDE 1.5 G/1.58G
20 POWDER, FOR SOLUTION ORAL 2 TIMES DAILY
COMMUNITY
End: 2019-06-26 | Stop reason: ALTCHOICE

## 2018-11-14 NOTE — TELEPHONE ENCOUNTER
Received message from Dr. Shipman:    Repeat BMP shows slightly worse kidney function on spironolactone and potassium is still low. Let's have her restart taking potassium. I've forwarded this result to her nephrologist, Dr. Otoole and will discuss with her whether to stop spironolactone. Let's have her hold spironolactone for right now.     Attempted to reach patient to discuss lab results and recommendations. Left message for her to return call.    Pauly Lopez, RN, BSN

## 2018-11-14 NOTE — TELEPHONE ENCOUNTER
Spoke to patient. Informed her of recommendations. She will stop the Spironolactone and resume Potassium 20 meQ BID.    Advised that she will be contacted by Dr. Otoole's scheduling team to schedule follow up.    Patient verbalized understanding.    Pauly Lopez, RN, BSN

## 2018-12-04 DIAGNOSIS — I50.22 CHRONIC SYSTOLIC HEART FAILURE (H): ICD-10-CM

## 2018-12-04 RX ORDER — POTASSIUM CHLORIDE 1.5 G/1.58G
20 POWDER, FOR SOLUTION ORAL 2 TIMES DAILY
Qty: 60 TABLET | Status: CANCELLED | OUTPATIENT
Start: 2018-12-04

## 2018-12-04 RX ORDER — POTASSIUM CHLORIDE 1500 MG/1
20 TABLET, EXTENDED RELEASE ORAL 2 TIMES DAILY
Qty: 60 TABLET | Refills: 2 | Status: SHIPPED | OUTPATIENT
Start: 2018-12-04 | End: 2019-04-19

## 2018-12-04 NOTE — TELEPHONE ENCOUNTER
Rx refilled per refill policy.  Patient according to 11/14/18 telephone encounter and BMP lab results from 11/8/18 , patient to follow up with nephrologist Dr. Otoole due to stopping spironolactone and restarting potassium 20 meq twice daily.  Writer refilled her potassium to last until her upcoming visit in cardiology with Dr. Pepe in Feb. 2019.      Will route to Dr. Otoole's team so they are aware of the medication change and if an appointment is necessary.    Bryanna Dubon, RN  Care Coordinator  [facility]  [phone]

## 2018-12-04 NOTE — TELEPHONE ENCOUNTER
Received fax on 12/04/2018  from [facility] in [place]  requesting refill(s) for the following medication(s):    1.   Rx Number:  [id number]   Drug:  K-Dur 20 meq. MG        Sig: Take one tablet by mouth twice daily   Prescribed Qty:  60                Patient's Last Cardiology Appointment:  11/01/18   Patient's Next Cardiology Appointment:  Feb. 2019      Refill encounter routed to LEIGH kwan.  Jackeline Ferrara L.P.N.

## 2018-12-19 DIAGNOSIS — I50.1 ACUTE PULMONARY EDEMA WITH CONGESTIVE HEART FAILURE (H): ICD-10-CM

## 2018-12-19 RX ORDER — FUROSEMIDE 40 MG
40 TABLET ORAL 2 TIMES DAILY
Qty: 90 TABLET | Refills: 3 | Status: CANCELLED | OUTPATIENT
Start: 2018-12-19

## 2019-01-14 DIAGNOSIS — I50.1 ACUTE PULMONARY EDEMA WITH CONGESTIVE HEART FAILURE (H): ICD-10-CM

## 2019-01-14 RX ORDER — FUROSEMIDE 40 MG
40 TABLET ORAL 2 TIMES DAILY
Qty: 180 TABLET | Refills: 1 | Status: SHIPPED | OUTPATIENT
Start: 2019-01-14 | End: 2019-07-03

## 2019-01-15 DIAGNOSIS — I50.1 ACUTE PULMONARY EDEMA WITH CONGESTIVE HEART FAILURE (H): ICD-10-CM

## 2019-01-15 NOTE — TELEPHONE ENCOUNTER
Pending Prescriptions:                       Disp   Refills    atorvastatin (LIPITOR) 20 MG tablet       30 tab*3            Sig: Take 1 tablet (20 mg) by mouth daily    Last Visit 9/27/18 rica/ Celestina JONES  Last Filled 12/18/18  Quantity 30  Req. Received 1/15/19  KAREEN Gomez

## 2019-01-18 RX ORDER — ATORVASTATIN CALCIUM 20 MG/1
20 TABLET, FILM COATED ORAL DAILY
Qty: 30 TABLET | Refills: 3 | Status: SHIPPED | OUTPATIENT
Start: 2019-01-18 | End: 2019-05-16

## 2019-04-15 ENCOUNTER — DOCUMENTATION ONLY (OUTPATIENT)
Dept: LAB | Facility: CLINIC | Age: 79
End: 2019-04-15

## 2019-04-15 DIAGNOSIS — I50.22 CHRONIC SYSTOLIC CONGESTIVE HEART FAILURE (H): Primary | ICD-10-CM

## 2019-04-19 ENCOUNTER — OFFICE VISIT (OUTPATIENT)
Dept: CARDIOLOGY | Facility: CLINIC | Age: 79
End: 2019-04-19
Payer: MEDICARE

## 2019-04-19 VITALS
DIASTOLIC BLOOD PRESSURE: 82 MMHG | WEIGHT: 187 LBS | SYSTOLIC BLOOD PRESSURE: 142 MMHG | BODY MASS INDEX: 31.12 KG/M2 | OXYGEN SATURATION: 98 % | HEART RATE: 83 BPM

## 2019-04-19 DIAGNOSIS — I50.22 CHRONIC SYSTOLIC CONGESTIVE HEART FAILURE (H): ICD-10-CM

## 2019-04-19 DIAGNOSIS — I50.1 ACUTE PULMONARY EDEMA WITH CONGESTIVE HEART FAILURE (H): ICD-10-CM

## 2019-04-19 DIAGNOSIS — I50.22 CHRONIC SYSTOLIC HEART FAILURE (H): Primary | ICD-10-CM

## 2019-04-19 LAB
ANION GAP SERPL CALCULATED.3IONS-SCNC: 8 MMOL/L (ref 3–14)
BUN SERPL-MCNC: 30 MG/DL (ref 7–30)
CALCIUM SERPL-MCNC: 9.5 MG/DL (ref 8.5–10.1)
CHLORIDE SERPL-SCNC: 104 MMOL/L (ref 94–109)
CO2 SERPL-SCNC: 30 MMOL/L (ref 20–32)
CREAT SERPL-MCNC: 2.83 MG/DL (ref 0.52–1.04)
GFR SERPL CREATININE-BSD FRML MDRD: 15 ML/MIN/{1.73_M2}
GLUCOSE SERPL-MCNC: 113 MG/DL (ref 70–99)
POTASSIUM SERPL-SCNC: 3.1 MMOL/L (ref 3.4–5.3)
SODIUM SERPL-SCNC: 142 MMOL/L (ref 133–144)

## 2019-04-19 PROCEDURE — 36415 COLL VENOUS BLD VENIPUNCTURE: CPT | Performed by: NURSE PRACTITIONER

## 2019-04-19 PROCEDURE — 99214 OFFICE O/P EST MOD 30 MIN: CPT | Performed by: NURSE PRACTITIONER

## 2019-04-19 PROCEDURE — 80048 BASIC METABOLIC PNL TOTAL CA: CPT | Performed by: NURSE PRACTITIONER

## 2019-04-19 RX ORDER — HYDRALAZINE HYDROCHLORIDE 50 MG/1
50 TABLET, FILM COATED ORAL 3 TIMES DAILY
Qty: 270 TABLET | Refills: 3 | Status: SHIPPED | OUTPATIENT
Start: 2019-04-19 | End: 2019-07-03

## 2019-04-19 RX ORDER — POTASSIUM CHLORIDE 1500 MG/1
20 TABLET, EXTENDED RELEASE ORAL 2 TIMES DAILY
Qty: 60 TABLET | Refills: 3 | Status: SHIPPED | OUTPATIENT
Start: 2019-04-19 | End: 2019-08-22

## 2019-04-19 RX ORDER — CARVEDILOL 12.5 MG/1
12.5 TABLET ORAL 2 TIMES DAILY WITH MEALS
Qty: 60 TABLET | Refills: 5 | Status: SHIPPED | OUTPATIENT
Start: 2019-04-19 | End: 2019-05-16

## 2019-04-19 NOTE — LETTER
"4/19/2019      RE: Jaja Ernandez  [address]  [place] [address]       Dear Colleague,    Thank you for the opportunity to participate in the care of your patient, Jaja Ernandez, at the [facility]. Please see a copy of my visit note below.    CARDIOLOGY CLINIC  Jaja Ernandez is a 78 year old female with chronic systolic heart failure, breast cancer in remission and hypertension who [place] [facility] [place] with her daughter present.     Last visit with Dr. Pepe was in November 2018.  She was started on Spironolactone, but from 11/14/18 telephone note - she stopped the spironolactone with nephrology's recommendations.     Today, the patient returns to clinic as she was told by clinic staff that she needed to be seen in order to refill medications. Patient states she wasn't able to be seen in February due to bad weather. She says she has been feeling well over all. She goes to the grocery store and walks up and down the aisles without stopping. She also tries to walk around her apartment and the building especially with the weather is good. She has run out of her potassium for the last week and she ran out of her Coreg as well . She was taking 6.25 mg of Coreg BID for the last week as she ran out of the 12.5 mg tabs. She also isn't completely sure that she takes Hydralazine TID , she wants to check at home and let us know. She thinks she maybe takes it once a day.  She says she hasn't been checking her BP's and weights at home.  She had her labs drawn today. She has some muscle aches in her legs.    She denies, CP, SOB, Palpitations, PND, syncope, near syncopal episodes.  She sleeps well at night using her two \" very flat \" pillows and says her appetite has been just fine.     PAST MEDICAL HISTORY:  Hypertension  CKD stage IV  Cardiomyopathy- EF in 2015 35%, recovered to 49% as of June 2015, then " down to 20-30% June, now back to 45%    FAMILY HISTORY:  Family History   Problem Relation Age of Onset     Breast Cancer Mother 50     Cancer Mother      Asthma Other      Unknown/Adopted Father      Breast Cancer Sister 70     Cancer Sister      Hypertension Daughter      Diabetes No family hx of      Cerebrovascular Disease No family hx of      Thyroid Disease No family hx of      Glaucoma No family hx of      Macular Degeneration No family hx of      SOCIAL HISTORY: , lives alone in apartment, daughter here with her today. Never a smoker.     CURRENT MEDICATIONS:  Outpatient Medications Prior to Visit   Medication Sig Dispense Refill     amLODIPine (NORVASC) 10 MG tablet Take 1 tablet (10 mg) by mouth At Bedtime 90 tablet 3     Ascorbic Acid (VITAMIN C PO) Take 500 mg by mouth daily        aspirin 81 MG tablet Take 81 mg by mouth daily        Aspirin-Salicylamide-Caffeine (BC HEADACHE POWDER PO) Take 1 powder on the tongue every 6 hours with water as needed for headache       atorvastatin (LIPITOR) 20 MG tablet Take 1 tablet (20 mg) by mouth daily 30 tablet 3     calcium-vitamin D (CALTRATE) 600-400 MG-UNIT per tablet Take 1 tablet by mouth daily       carvedilol (COREG) 12.5 MG tablet Take 1 tablet (12.5 mg) by mouth 2 times daily (with meals) 60 tablet 5     docusate sodium (COLACE) 100 MG capsule Take 1 capsule (100 mg) by mouth 2 times daily as needed for constipation 60 capsule 0     ferrous sulfate (IRON) 325 (65 Fe) MG tablet Take 1 tablet (325 mg) by mouth 2 times daily 100 tablet 0     furosemide (LASIX) 40 MG tablet Take 1 tablet (40 mg) by mouth 2 times daily 180 tablet 1     garlic 150 MG TABS Take 150 mg by mouth daily       isosorbide mononitrate (IMDUR) 60 MG 24 hr tablet Take 1 tablet (60 mg) by mouth 2 times daily 180 tablet 3     Multiple Vitamins-Minerals (CENTRUM SILVER) per tablet Take 1 tablet by mouth daily       vitamin E (VITAMIN E-400) 400 UNIT capsule Take 400 Units by mouth  daily        ACE/ARB/ARNI NOT PRESCRIBED, INTENTIONAL, Please choose reason not prescribed, below       hydrALAZINE (APRESOLINE) 50 MG tablet Take 1 tablet (50 mg) by mouth 3 times daily (Patient not taking: Reported on 4/19/2019) 270 tablet 3     potassium chloride (KLOR-CON) 20 MEQ Packet Take 20 mEq by mouth 2 times daily       potassium chloride ER (K-DUR/KLOR-CON M) 20 MEQ CR tablet Take 1 tablet (20 mEq) by mouth 2 times daily (Patient not taking: Reported on 4/19/2019) 60 tablet 2     Facility-Administered Medications Prior to Visit   Medication Dose Route Frequency Provider Last Rate Last Dose     sodium chloride (PF) 0.9% PF flush 10 mL  10 mL Intracatheter Once Darrel Pepe MD           ROS:  Constitutional: no fever, chills, or diaphoresis. Weight stable  ENT: no visual changes, epistaxis, vertigo  Respiratory:  As above  Cards: as above  GI: no nausea, vomiting, diarrhea, melena, or hematochezia.   : + urinary frequency, no dysuria or hematuria.   Integument: Negative for bruising, rash, or pruritis.  Neuro: No headaches, syncope, focal weakness    Musculoskeletal: Negative for gout, joint stiffness/ swelling, or muscle weakness    EXAM:  Vitals: afebrile, HR 83, Oxygen 98% , /82   General: seated in chair, in NAD  HEENT: NC/AT, sclera anicteric, MMM    Card: RRR, 2/6 systolic murmur noted, JVP not detectable at 90 degrees  Pulm: CTA B, no rales, ronchi, or wheezing.   Abdomen: ND, +BS, soft, NT  Extremities: no clubbing, cyanosis or edema.    Neurological: alert, conversant with normal speech, moving all extremities equally  Psych: pleasant mood and affect  Vascular: No carotid bruit noted. Radial and DP pulses bilaterally 2+    Labs:  CMP RESULTS:  Lab Results   Component Value Date     11/08/2018    POTASSIUM 3.1 (L) 11/08/2018    CHLORIDE 103 11/08/2018    CO2 30 11/08/2018    ANIONGAP 9 11/08/2018     (H) 11/08/2018    BUN 50 (H) 11/08/2018    CR 3.67 (H) 11/08/2018     GFRESTIMATED 12 (L) 11/08/2018    GFRESTBLACK 14 (L) 11/08/2018    CHICHO 9.4 11/08/2018    BILITOTAL 0.5 08/30/2018    ALBUMIN 4.1 08/30/2018    ALKPHOS 98 08/30/2018    ALT 16 08/30/2018    AST 21 08/30/2018       DATA:  Echocardiogram 9/10/2018: Mildly reduced LV function 45-50%, RV function and size normal. Mitral annular calcification noted with mildly restricted posterior leaflet with mild to moderate MR, mild TR, moderate to severe LA enlargement, noted PFO with left to right shunt.     Assessment and Plan:   Jaja Ernandez is a pleasant 78 year old female with a past medical history including chronic diastolic and systolic heart failure who presents to [facility] clinic today for follow up.     Patient will start back on her potassium 20 MEQ BID, Carvedilol 12.5 mg BID and Hydralazine 50 mg TID. We also discussed the importance of following a low salt diet and to that we would let her know what her lab results are. She prefers My Chart for communication.  She needs to set up a PCP , she and her daughter will make sure to set that up in the near future.    1. Chronic diastolic and systolic heart failure secondary to NICM, likely hypertensive cardiomyopathy  Stage C, Class III  ACEi/ARB: deferred due to CKD, Afterload of hydralazine and Imdur  BB: Carvedilol 12.5mg BID as previously instructed  Aldosterone antagonist: deferred due to CKD  Diuretics: euvolemic today. Takes  40 mg of Lasix BID  SCD prophylaxis NA EF now 45%  Sleep Apnea Evaluation: doesn't feel she snores, not interested in pursuing  NSAIDS: contraindicated, discussed with patient     Follow-up: Dr. Pepe in 1 month with labs.     Brent STOKES, CNP

## 2019-04-19 NOTE — NURSING NOTE
Diet: Patient instructed regarding a heart healthy diet, including discussion of reduced fat and sodium intake. Patient demonstrated understanding of this information and agreed to call with further questions or concerns.  Return Appointment: Patient given instructions regarding scheduling next clinic visit. Patient demonstrated understanding of this information and agreed to call with further questions or concerns.  Patient stated she understood all health information given and agreed to call with further questions or concerns.     Jaja called back to report she is taking Hydralazine 50 mg two times a day. Reviewed with Brent Condon who recommends increasing it to 3 times a day and reviewed this with Jaja who verbalized understanding.       Kerry Fernandez RN

## 2019-04-19 NOTE — PROGRESS NOTES
"CARDIOLOGY CLINIC  Jaja Ernandez is a 78 year old female with chronic systolic heart failure, breast cancer in remission and hypertension who presents to [facility] for follow up with her daughter present.     Last visit with Dr. Pepe was in November 2018.  She was started on Spironolactone, but from 11/14/18 telephone note - she stopped the spironolactone with nephrology's recommendations.     Today, the patient returns to clinic as she was told by clinic staff that she needed to be seen in order to refill medications. Patient states she wasn't able to be seen in February due to bad weather. She says she has been feeling well over all. She goes to the grocery store and walks up and down the aisles without stopping. She also tries to walk around her apartment and the building especially with the weather is good. She has run out of her potassium for the last week and she ran out of her Coreg as well . She was taking 6.25 mg of Coreg BID for the last week as she ran out of the 12.5 mg tabs. She also isn't completely sure that she takes Hydralazine TID , she wants to check at home and let us know. She thinks she maybe takes it once a day.  She says she hasn't been checking her BP's and weights at home.  She had her labs drawn today. She has some muscle aches in her legs.    She denies, CP, SOB, Palpitations, PND, syncope, near syncopal episodes.  She sleeps well at night using her two \" very flat \" pillows and says her appetite has been just fine.     PAST MEDICAL HISTORY:  Hypertension  CKD stage IV  Cardiomyopathy- EF in 2015 35%, recovered to 49% as of June 2015, then down to 20-30% June, now back to 45%    FAMILY HISTORY:  Family History   Problem Relation Age of Onset     Breast Cancer Mother 50     Cancer Mother      Asthma Other      Unknown/Adopted Father      Breast Cancer Sister 70     Cancer Sister      Hypertension Daughter      Diabetes No family hx of      Cerebrovascular Disease No family hx of      " Thyroid Disease No family hx of      Glaucoma No family hx of      Macular Degeneration No family hx of      SOCIAL HISTORY: , lives alone in apartment, daughter here with her today. Never a smoker.     CURRENT MEDICATIONS:  Outpatient Medications Prior to Visit   Medication Sig Dispense Refill     amLODIPine (NORVASC) 10 MG tablet Take 1 tablet (10 mg) by mouth At Bedtime 90 tablet 3     Ascorbic Acid (VITAMIN C PO) Take 500 mg by mouth daily        aspirin 81 MG tablet Take 81 mg by mouth daily        Aspirin-Salicylamide-Caffeine (BC HEADACHE POWDER PO) Take 1 powder on the tongue every 6 hours with water as needed for headache       atorvastatin (LIPITOR) 20 MG tablet Take 1 tablet (20 mg) by mouth daily 30 tablet 3     calcium-vitamin D (CALTRATE) 600-400 MG-UNIT per tablet Take 1 tablet by mouth daily       carvedilol (COREG) 12.5 MG tablet Take 1 tablet (12.5 mg) by mouth 2 times daily (with meals) 60 tablet 5     docusate sodium (COLACE) 100 MG capsule Take 1 capsule (100 mg) by mouth 2 times daily as needed for constipation 60 capsule 0     ferrous sulfate (IRON) 325 (65 Fe) MG tablet Take 1 tablet (325 mg) by mouth 2 times daily 100 tablet 0     furosemide (LASIX) 40 MG tablet Take 1 tablet (40 mg) by mouth 2 times daily 180 tablet 1     garlic 150 MG TABS Take 150 mg by mouth daily       isosorbide mononitrate (IMDUR) 60 MG 24 hr tablet Take 1 tablet (60 mg) by mouth 2 times daily 180 tablet 3     Multiple Vitamins-Minerals (CENTRUM SILVER) per tablet Take 1 tablet by mouth daily       vitamin E (VITAMIN E-400) 400 UNIT capsule Take 400 Units by mouth daily        ACE/ARB/ARNI NOT PRESCRIBED, INTENTIONAL, Please choose reason not prescribed, below       hydrALAZINE (APRESOLINE) 50 MG tablet Take 1 tablet (50 mg) by mouth 3 times daily (Patient not taking: Reported on 4/19/2019) 270 tablet 3     potassium chloride (KLOR-CON) 20 MEQ Packet Take 20 mEq by mouth 2 times daily       potassium chloride  ER (K-DUR/KLOR-CON M) 20 MEQ CR tablet Take 1 tablet (20 mEq) by mouth 2 times daily (Patient not taking: Reported on 4/19/2019) 60 tablet 2     Facility-Administered Medications Prior to Visit   Medication Dose Route Frequency Provider Last Rate Last Dose     sodium chloride (PF) 0.9% PF flush 10 mL  10 mL Intracatheter Once Darrel Pepe MD           ROS:  Constitutional: no fever, chills, or diaphoresis. Weight stable  ENT: no visual changes, epistaxis, vertigo  Respiratory:  As above  Cards: as above  GI: no nausea, vomiting, diarrhea, melena, or hematochezia.   : + urinary frequency, no dysuria or hematuria.   Integument: Negative for bruising, rash, or pruritis.  Neuro: No headaches, syncope, focal weakness    Musculoskeletal: Negative for gout, joint stiffness/ swelling, or muscle weakness    EXAM:  Vitals: afebrile, HR 83, Oxygen 98% , /82   General: seated in chair, in NAD  HEENT: NC/AT, sclera anicteric, MMM    Card: RRR, 2/6 systolic murmur noted, JVP not detectable at 90 degrees  Pulm: CTA B, no rales, ronchi, or wheezing.   Abdomen: ND, +BS, soft, NT  Extremities: no clubbing, cyanosis or edema.    Neurological: alert, conversant with normal speech, moving all extremities equally  Psych: pleasant mood and affect  Vascular: No carotid bruit noted. Radial and DP pulses bilaterally 2+    Labs:  CMP RESULTS:  Lab Results   Component Value Date     11/08/2018    POTASSIUM 3.1 (L) 11/08/2018    CHLORIDE 103 11/08/2018    CO2 30 11/08/2018    ANIONGAP 9 11/08/2018     (H) 11/08/2018    BUN 50 (H) 11/08/2018    CR 3.67 (H) 11/08/2018    GFRESTIMATED 12 (L) 11/08/2018    GFRESTBLACK 14 (L) 11/08/2018    CHICHO 9.4 11/08/2018    BILITOTAL 0.5 08/30/2018    ALBUMIN 4.1 08/30/2018    ALKPHOS 98 08/30/2018    ALT 16 08/30/2018    AST 21 08/30/2018       DATA:  Echocardiogram 9/10/2018: Mildly reduced LV function 45-50%, RV function and size normal. Mitral annular calcification noted with  mildly restricted posterior leaflet with mild to moderate MR, mild TR, moderate to severe LA enlargement, noted PFO with left to right shunt.     Assessment and Plan:   Jaja Ernandez is a pleasant 78 year old female with a past medical history including chronic diastolic and systolic heart failure who presents to CORE clinic today for follow up.     Patient will start back on her potassium 20 MEQ BID, Carvedilol 12.5 mg BID and Hydralazine 50 mg TID. We also discussed the importance of following a low salt diet and to that we would let her know what her lab results are. She prefers My Chart for communication.  She needs to set up a PCP , she and her daughter will make sure to set that up in the near future.    1. Chronic diastolic and systolic heart failure secondary to NICM, likely hypertensive cardiomyopathy  Stage C, Class III  ACEi/ARB: deferred due to CKD, Afterload of hydralazine and Imdur  BB: Carvedilol 12.5mg BID as previously instructed  Aldosterone antagonist: deferred due to CKD  Diuretics: euvolemic today. Takes  40 mg of Lasix BID  SCD prophylaxis NA EF now 45%  Sleep Apnea Evaluation: doesn't feel she snores, not interested in pursuing  NSAIDS: contraindicated, discussed with patient         Follow-up: Dr. Pepe in 1 month with labs.     Brent STOKES, CNP

## 2019-04-19 NOTE — PATIENT INSTRUCTIONS
Take your medicines every day, as directed    Changes made today:  o Call us at [phone] option 1, option 3 or mychart us to clarify how many doses of Hydralazine you are taking  o Go back to taking Coreg 12.5 mg twice daily - refill has been sent.   o Refill sent for potassium Monitor Your Weight and Symptoms    Contact us if you:      Gain 2 pounds in one day or 5 pounds in one week    Feel more short of breath    Notice more leg swelling    Feel lightheadeded Change your lifestyle    Limit Salt or Sodium:    2000 mg  Limit Fluids:    2000 mL or approximately 64 ounces  Eat a Heart Healthy Diet    Low in saturated fats  Stay Active:    Aim to move at least 150 minutes every  week       To Contact us  During Business Hours:  [phone], option # 1 (University)  Then option # 3 (medical questions)     After hours, weekends or holidays:   [phone], Option #4  Ask to speak to the On-Call Cardiologist. Inform them you are a CORE/heart failure patient at the [facility].   Use [employer] allows you to communicate directly with your heart team through secure messaging.    [employer] can be accessed any time on your phone, computer, or tablet.    If you need assistance, we'd be happy to help!       Keep your Heart Appointments:    See Dr Pepe in one month at [facility]  We will mychart you with lab results on Monday.

## 2019-04-19 NOTE — NURSING NOTE
"Chief Complaint   Patient presents with     Heart Failure     80 yo female, HFrEF, EF 24%, labs prior.        Initial BP (!) 176/96 (BP Location: Left arm, Patient Position: Sitting, Cuff Size: Adult Large)   Pulse 83   Wt 84.8 kg (187 lb)   SpO2 98%   BMI 31.12 kg/m   Estimated body mass index is 31.12 kg/m  as calculated from the following:    Height as of 7/10/18: 1.651 m (5' 5\").    Weight as of this encounter: 84.8 kg (187 lb)..  BP completed using cuff size: large low arm    Jackeline Ferrara L.P.N.  Patient here with another person, IPV. Screen not initiated. Jackeline Ferrara LPN.      "

## 2019-05-14 ENCOUNTER — DOCUMENTATION ONLY (OUTPATIENT)
Dept: LAB | Facility: CLINIC | Age: 79
End: 2019-05-14

## 2019-05-15 ENCOUNTER — DOCUMENTATION ONLY (OUTPATIENT)
Dept: LAB | Facility: CLINIC | Age: 79
End: 2019-05-15

## 2019-05-15 DIAGNOSIS — I50.22 CHRONIC SYSTOLIC CONGESTIVE HEART FAILURE (H): Primary | ICD-10-CM

## 2019-05-16 ENCOUNTER — OFFICE VISIT (OUTPATIENT)
Dept: CARDIOLOGY | Facility: CLINIC | Age: 79
End: 2019-05-16
Payer: MEDICARE

## 2019-05-16 VITALS
TEMPERATURE: 98.1 F | RESPIRATION RATE: 16 BRPM | OXYGEN SATURATION: 95 % | WEIGHT: 188.4 LBS | SYSTOLIC BLOOD PRESSURE: 147 MMHG | BODY MASS INDEX: 31.35 KG/M2 | HEART RATE: 82 BPM | DIASTOLIC BLOOD PRESSURE: 87 MMHG

## 2019-05-16 DIAGNOSIS — I50.22 CHRONIC SYSTOLIC CONGESTIVE HEART FAILURE (H): ICD-10-CM

## 2019-05-16 DIAGNOSIS — I50.22 CHRONIC SYSTOLIC HEART FAILURE (H): ICD-10-CM

## 2019-05-16 DIAGNOSIS — I10 HYPERTENSION GOAL BP (BLOOD PRESSURE) < 140/90: ICD-10-CM

## 2019-05-16 DIAGNOSIS — N18.4 CKD (CHRONIC KIDNEY DISEASE) STAGE 4, GFR 15-29 ML/MIN (H): ICD-10-CM

## 2019-05-16 DIAGNOSIS — I50.1 ACUTE PULMONARY EDEMA WITH CONGESTIVE HEART FAILURE (H): ICD-10-CM

## 2019-05-16 DIAGNOSIS — I27.22 PULMONARY HYPERTENSION DUE TO LEFT HEART DISEASE (H): ICD-10-CM

## 2019-05-16 DIAGNOSIS — I36.1 TRICUSPID VALVE REGURGITATION, NONRHEUMATIC: ICD-10-CM

## 2019-05-16 DIAGNOSIS — I50.22 CHRONIC SYSTOLIC CONGESTIVE HEART FAILURE (H): Primary | ICD-10-CM

## 2019-05-16 LAB
ANION GAP SERPL CALCULATED.3IONS-SCNC: 6 MMOL/L (ref 3–14)
BUN SERPL-MCNC: 51 MG/DL (ref 7–30)
CALCIUM SERPL-MCNC: 10.6 MG/DL (ref 8.5–10.1)
CHLORIDE SERPL-SCNC: 104 MMOL/L (ref 94–109)
CO2 SERPL-SCNC: 31 MMOL/L (ref 20–32)
CREAT SERPL-MCNC: 3.56 MG/DL (ref 0.52–1.04)
GFR SERPL CREATININE-BSD FRML MDRD: 12 ML/MIN/{1.73_M2}
GLUCOSE SERPL-MCNC: 104 MG/DL (ref 70–99)
POTASSIUM SERPL-SCNC: 3.9 MMOL/L (ref 3.4–5.3)
SODIUM SERPL-SCNC: 141 MMOL/L (ref 133–144)

## 2019-05-16 PROCEDURE — 99214 OFFICE O/P EST MOD 30 MIN: CPT | Performed by: INTERNAL MEDICINE

## 2019-05-16 PROCEDURE — 36415 COLL VENOUS BLD VENIPUNCTURE: CPT | Performed by: INTERNAL MEDICINE

## 2019-05-16 PROCEDURE — 80048 BASIC METABOLIC PNL TOTAL CA: CPT | Performed by: INTERNAL MEDICINE

## 2019-05-16 RX ORDER — CARVEDILOL 25 MG/1
25 TABLET ORAL 2 TIMES DAILY WITH MEALS
Qty: 180 TABLET | Refills: 3 | Status: SHIPPED | OUTPATIENT
Start: 2019-05-16

## 2019-05-16 RX ORDER — ATORVASTATIN CALCIUM 20 MG/1
20 TABLET, FILM COATED ORAL DAILY
Qty: 30 TABLET | Refills: 3 | Status: SHIPPED | OUTPATIENT
Start: 2019-05-16 | End: 2019-08-22

## 2019-05-16 RX ORDER — AMLODIPINE BESYLATE 10 MG/1
10 TABLET ORAL AT BEDTIME
Qty: 90 TABLET | Refills: 3 | Status: SHIPPED | OUTPATIENT
Start: 2019-05-16

## 2019-05-16 RX ORDER — CARVEDILOL 12.5 MG/1
25 TABLET ORAL 2 TIMES DAILY WITH MEALS
Qty: 60 TABLET | Refills: 5 | Status: SHIPPED | OUTPATIENT
Start: 2019-05-16 | End: 2019-05-16

## 2019-05-16 ASSESSMENT — PAIN SCALES - GENERAL: PAINLEVEL: NO PAIN (0)

## 2019-05-16 NOTE — PROGRESS NOTES
"Chief complaint: Follow up of chronic systolic HF    HPI:   Jaja Ernandez is a 79 year old woman with chronic systolic HF due to NICM LVEF=24% (TTE 6/29/18), HTN, DM2, CKD4, and non-obstructive CAD who presents for follow up of HF.     Cardiac History:  She established care with me 7/2018 after her inpatient admission. My note from that time: \"She was admitted at [facility] 6/29-7/2 for decompensated HF (exertional dyspnea/cough), severe HTN (190s/130s), and TATIANA-on-CKD (Cr 1.8 > 3.5) in the setting of self-discontinuation of all of her antihypertensive meds for 3 months. She was treated with diuretics and restarted on neurohormonal HF therapy/antihypertensives. TTE in-house was notable for fall in her LVEF (24% from 50% in 2015), elevated PA pressure, and worsening function MR/TR.  She was discharged home and established care in the CORE clinic, where she has been followed with gradual up-titration of her medical therapy. She reports significant improvement in her functional capacity, and reports that she was at least once able to walk up 2 flights (32 steps), and was only short of breath at the top.\"  At her visit on 11/1/18, she had repeat TTE which showed improved LVEF to 45-50%.     Interval history:  She was seen in CORE clinic 4/19/19. She had missed doses of her carvedilol and hydralazine and was hypertensive. At that time carvedilol was increased to 12.5 mg BID and hydralazine/IMDN adjusted.   She reports good adherence to her medical therapy and states that she is tolerating her current regimen well.     She denies any chest pain, dyspnea above baseline, PND, orthopnea, peripheral edema, palpitations, lightheadedness or syncope.     PAST MEDICAL HISTORY:  Past Medical History:   Diagnosis Date     Cataract      Chronic renal failure, stage 3 (moderate) (H)      Congestive heart failure, unspecified      Hypertension      Non-obstructive CAD without angina 5/19/2015     Non-obstructive CAD without angina " 5/19/2015     Nonischemic cardiomyopathy (H) 8/25/2015     Other nonspecific abnormal cardiovascular system function study 5/15/2015     Systolic CHF (H)     LVEF 35-40% 3/2015       CURRENT MEDICATIONS:  Current Outpatient Medications   Medication Sig Dispense Refill     ACE/ARB/ARNI NOT PRESCRIBED, INTENTIONAL, Please choose reason not prescribed, below       amLODIPine (NORVASC) 10 MG tablet Take 1 tablet (10 mg) by mouth At Bedtime 90 tablet 3     Ascorbic Acid (VITAMIN C PO) Take 500 mg by mouth daily        aspirin 81 MG tablet Take 81 mg by mouth daily        Aspirin-Salicylamide-Caffeine (BC HEADACHE POWDER PO) Take 1 powder on the tongue every 6 hours with water as needed for headache       atorvastatin (LIPITOR) 20 MG tablet Take 1 tablet (20 mg) by mouth daily 30 tablet 3     calcium-vitamin D (CALTRATE) 600-400 MG-UNIT per tablet Take 1 tablet by mouth daily       docusate sodium (COLACE) 100 MG capsule Take 1 capsule (100 mg) by mouth 2 times daily as needed for constipation 60 capsule 0     ferrous sulfate (IRON) 325 (65 Fe) MG tablet Take 1 tablet (325 mg) by mouth 2 times daily 100 tablet 0     furosemide (LASIX) 40 MG tablet Take 1 tablet (40 mg) by mouth 2 times daily 180 tablet 1     garlic 150 MG TABS Take 150 mg by mouth daily       hydrALAZINE (APRESOLINE) 50 MG tablet Take 1 tablet (50 mg) by mouth 3 times daily 270 tablet 3     isosorbide mononitrate (IMDUR) 60 MG 24 hr tablet Take 1 tablet (60 mg) by mouth 2 times daily 180 tablet 3     Multiple Vitamins-Minerals (CENTRUM SILVER) per tablet Take 1 tablet by mouth daily       potassium chloride (KLOR-CON) 20 MEQ Packet Take 20 mEq by mouth 2 times daily       potassium chloride ER (K-DUR/KLOR-CON M) 20 MEQ CR tablet Take 1 tablet (20 mEq) by mouth 2 times daily 60 tablet 3     vitamin E (VITAMIN E-400) 400 UNIT capsule Take 400 Units by mouth daily        carvedilol (COREG) 25 MG tablet Take 1 tablet (25 mg) by mouth 2 times daily (with  meals) 180 tablet 3       PAST SURGICAL HISTORY:  Past Surgical History:   Procedure Laterality Date     ANGIOGRAM      3/2015     BRAIN TUMOR RESECTION  7-8-1997    Benign brain tumor     csection       HYSTERECTOMY TOTAL ABDOMINAL      benign condition       ALLERGIES:     Allergies   Allergen Reactions     Demerol [Meperidine] Nausea and Vomiting       FAMILY HISTORY:  Family History   Problem Relation Age of Onset     Breast Cancer Mother 50     Cancer Mother      Asthma Other      Unknown/Adopted Father      Breast Cancer Sister 70     Cancer Sister      Hypertension Daughter      Diabetes No family hx of      Cerebrovascular Disease No family hx of      Thyroid Disease No family hx of      Glaucoma No family hx of      Macular Degeneration No family hx of      SOCIAL HISTORY:  Social History     Tobacco Use     Smoking status: Never Smoker     Smokeless tobacco: Never Used   Substance Use Topics     Alcohol use: No     Drug use: No       ROS:   A comprehensive 14 point review of systems is negative other than as mentioned in HPI.    Exam:   /87 (BP Location: Right arm, Patient Position: Sitting, Cuff Size: Adult Large)   Pulse 82   Temp 98.1  F (36.7  C) (Oral)   Resp 16   Wt 85.5 kg (188 lb 6.4 oz)   SpO2 95%   BMI 31.35 kg/m    GENERAL APPEARANCE: healthy, alert and no distress  EYES: no icterus, no xanthelasmas  ENT: normal palate, mucosa moist, no central cyanosis  NECK:  JVP~7  RESPIRATORY: lungs clear to auscultation - no rales, rhonchi or wheezes, no use of accessory muscles, no retractions, respirations are unlabored, normal respiratory rate  CARDIOVASCULAR: regular rhythm, +3/6 LLSB holosystolic murmur. No s3/s4.   GI: soft, non tender, without hepatosplenomegaly, no masses palpable, bowel sounds normal, aorta not enlarged by palpation, no abdominal bruits  EXTREMITIES: peripheral pulses normal, no edema, no bruits  NEURO: alert and oriented to person/place/time, normal speech, gait and  affect  VASC: Radial pulses 2+ bilaterally.  SKIN: no ecchymoses, no rashes.  PSYCH: cooperative, affect appropriate.     Labs:  Reviewed. Of note:  BMP today (8/30/18): Cr 2.97 (<3.6<3.0<3.3 (6/29); 1.89 on 10/27/16.)      Testing/Procedures:    TTE 9/10/18:  Normal LV size with mildly reduced LV function, LVEF=45-50%. GLS -12%.  Normal RV size/function.  Mild-to-moderate MR. Mild TR. PASP~34 mmHg. RAP~3 mmHg.  Small PFO (seen on prior TTE from 2015.)  Compared with 6/29/18: LVEF has improved (previously 24%), MR/TR have decreased, and PA/RA pressures have improved.     Assessment and Plan:   79 year old woman with chronic systolic HF due to NICM LVEF=24% (TTE 6/29/18), HTN, DM2, CKD5, and non-obstructive CAD, now with improving LVEF on neurohormonal therapy.     #Chronic systolic HF due to NICM (likely hypertensive cardiomyopathy), with improving LVEF:  LVEF=24% on 6/29 (from 50% in 2015), now improved to 45-50% on TTE 9/2018.   NYHA class II-III, stage C. Clinically euvolemic and well-perfused on today's exam.   -Beta blocker: increase carvedilol to 25 mg BID   -ACEI/ARB/ARNI: relatively contraindicated (CKD5.) Will hold off for the present.   -yesenia antagonist: deferred due to renal function  -afterload reduction: continue hydralazine/ISMN  -SCD prophylaxis: not indicated (LVEF>35%)  -CRT: not a candidate (narrow QRS)    #Secondary/functional mitral regurgitation, improved:  #Secondary/functional tricuspid regurgitaiton, improved:  #Pulmonary venous hypertension (WHO Group II)  All diagnosed in the setting of acute decompensated HF on her 6/29/18 TTE. All have improved with restoration of euvolemia and improvement in LVEF.   -manage HF as above    #HTN with nephropathy and cardiomyopathy:  Control improved. Have added spironolactone as above.     #CKD5 due to hypertension/DM:  Unfortunately renal function remains persistently reduced, limiting neurohormonal therapy somewhat. Will continue to monitor, avoid  nephrotoxic meds.     #Non-obstructive CAD w/o angina (based on cath from 2015): continue ASA 81 mg daily, atorvastatin 20 mg daily    The patient states understanding and is agreeable with plan.     Darrel Pepe MD  Cardiology    CC  [facility]

## 2019-05-16 NOTE — NURSING NOTE
Jaja Ernandez's goals for this visit include:   Chief Complaint   Patient presents with     RECHECK     CHF- bmp done prior       She requests these members of her care team be copied on today's visit information: Yes    PCP: Edi Ribeiro    Referring Provider:  No referring provider defined for this encounter.    /87 (BP Location: Right arm, Patient Position: Sitting, Cuff Size: Adult Large)   Pulse 82   Temp 98.1  F (36.7  C) (Oral)   Resp 16   Wt 85.5 kg (188 lb 6.4 oz)   SpO2 95%   BMI 31.35 kg/m      Do you need any medication refills at today's visit? Yes, LIPITOR but was prescribed by a different provider.    Moi Cha CMA (AAMA)

## 2019-05-16 NOTE — PATIENT INSTRUCTIONS
The following is a summary of your office visit:    Medications started today:    Medications stopped today:    Medication dose change:   increase carvedilol (Coreg) to 25 mg twice daily (take 2 pills twice daily until you run out, then fill new prescription)    Nurse contact information: Pauly Lopez RN  Cardiology Care Coordinator  [phone] Phone  [phone] Fax    Appointments made today:   CORE clinic in 1 month  Dr. Pepe in [address] [place] [address]    Patient instructions:      If you have had any blood work, imaging or other testing completed we will be in touch within 1-2 weeks regarding the results. If you have any questions, concerns or need to schedule a follow up, please contact us at [phone]. If you are needing refills please contact your pharmacy. For urgent after hour care please call the [facility] at [phone] or the [facility] at [phone] and ask to speak to the cardiologist on call.    It was a pleasure meeting with you today. Please let us know if there is anything else we can do for you so that we can be sure you are leaving completely satisfied with your care experience.     Your Cardiology Team at [facility]  RN Care Coordinator: Pauly DOMINGUEZ: Lor

## 2019-06-19 ENCOUNTER — PRE VISIT (OUTPATIENT)
Dept: CARDIOLOGY | Facility: CLINIC | Age: 79
End: 2019-06-19

## 2019-06-19 DIAGNOSIS — I50.22 CHRONIC SYSTOLIC CONGESTIVE HEART FAILURE (H): Primary | ICD-10-CM

## 2019-06-26 ENCOUNTER — OFFICE VISIT (OUTPATIENT)
Dept: CARDIOLOGY | Facility: CLINIC | Age: 79
End: 2019-06-26
Payer: MEDICARE

## 2019-06-26 VITALS
WEIGHT: 189.7 LBS | DIASTOLIC BLOOD PRESSURE: 75 MMHG | BODY MASS INDEX: 31.57 KG/M2 | HEART RATE: 61 BPM | SYSTOLIC BLOOD PRESSURE: 148 MMHG | OXYGEN SATURATION: 97 %

## 2019-06-26 DIAGNOSIS — I42.8 NONISCHEMIC CARDIOMYOPATHY (H): ICD-10-CM

## 2019-06-26 DIAGNOSIS — I50.22 CHRONIC SYSTOLIC CONGESTIVE HEART FAILURE (H): Primary | ICD-10-CM

## 2019-06-26 DIAGNOSIS — I50.1 ACUTE PULMONARY EDEMA WITH CONGESTIVE HEART FAILURE (H): ICD-10-CM

## 2019-06-26 DIAGNOSIS — I10 HYPERTENSION GOAL BP (BLOOD PRESSURE) < 140/90: ICD-10-CM

## 2019-06-26 DIAGNOSIS — I27.22 PULMONARY HYPERTENSION DUE TO LEFT HEART DISEASE (H): ICD-10-CM

## 2019-06-26 DIAGNOSIS — I50.22 CHRONIC SYSTOLIC CONGESTIVE HEART FAILURE (H): ICD-10-CM

## 2019-06-26 LAB
ANION GAP SERPL CALCULATED.3IONS-SCNC: 5 MMOL/L (ref 3–14)
BUN SERPL-MCNC: 48 MG/DL (ref 7–30)
CALCIUM SERPL-MCNC: 10.1 MG/DL (ref 8.5–10.1)
CHLORIDE SERPL-SCNC: 103 MMOL/L (ref 94–109)
CO2 SERPL-SCNC: 32 MMOL/L (ref 20–32)
CREAT SERPL-MCNC: 3.81 MG/DL (ref 0.52–1.04)
GFR SERPL CREATININE-BSD FRML MDRD: 11 ML/MIN/{1.73_M2}
GLUCOSE SERPL-MCNC: 110 MG/DL (ref 70–99)
NT-PROBNP SERPL-MCNC: 516 PG/ML (ref 0–450)
POTASSIUM SERPL-SCNC: 3.7 MMOL/L (ref 3.4–5.3)
SODIUM SERPL-SCNC: 140 MMOL/L (ref 133–144)

## 2019-06-26 PROCEDURE — 36415 COLL VENOUS BLD VENIPUNCTURE: CPT | Performed by: NURSE PRACTITIONER

## 2019-06-26 PROCEDURE — 99214 OFFICE O/P EST MOD 30 MIN: CPT | Performed by: NURSE PRACTITIONER

## 2019-06-26 PROCEDURE — 83880 ASSAY OF NATRIURETIC PEPTIDE: CPT | Performed by: NURSE PRACTITIONER

## 2019-06-26 PROCEDURE — 80048 BASIC METABOLIC PNL TOTAL CA: CPT | Performed by: NURSE PRACTITIONER

## 2019-06-26 ASSESSMENT — PAIN SCALES - GENERAL: PAINLEVEL: NO PAIN (0)

## 2019-06-26 NOTE — PATIENT INSTRUCTIONS
"You were seen today in the CORE Clinic at [facility]    Follow up and medication changes:  -Lasix 40 daily  -Potassium continue  -Will continue to work on taking the Hydralazine three times a day.   -Labs on 7/8/19 - Call [facility] to schedule [phone]  -July 17 th CORE Clinic and labs before  -Check Blood pressure every day.  -Check weight daily in the morning.             Please limit your fluid intake to 2 L (68 ounces) daily.  2 Liters a day = 8.5 cups, or 68 ounces.  Please limit your salt intake to 2 grams a day or less.     If you gain 2 pounds in 24 hours or 5 pounds in one week call the CORE Clinic at the [facility] so we can adjust your medications as needed over the phone.     Questions: [phone].   First press #1 for the University and then press #3 for \"Medical Questions\" to reach the Cardiology Nurses.      On Call Cardiologist for after hours or on weekends: [phone]   option #4 and ask to speak to the on-call Cardiologist. Inform them you are a CORE/heart failure patient at the [facility].    If you need to schedule or reschedule your appointment please call [phone].          If you need a medication refill please contact your pharmacy.  Please allow 3 business days for your refill to be completed.  _______________________________________________________  C.O.R[facility] CLINIC Cardiomyopathy, Optimization, Rehabilitation, Education   The C.O.R.E. CLINIC is a heart failure specialty clinic within the [facility] where you will work with specialized nurse practitioners dedicated to helping patients with heart failure carefully adjust medications, receive education, and learn who and when to call if symptoms develop. They specialize in helping you better understand your condition, slow the progression of your disease, improve the length and quality of your life, help you detect future heart problems before they become life threatening, " and avoid hospitalizations.  As always, thank you for trusting us with your health care needs!

## 2019-06-26 NOTE — NURSING NOTE
Jaja Ernandez's goals for this visit include:   Chief Complaint   Patient presents with     RECHECK      Return CORE, 80 yo female, HFrEF, EF 24%, labs prior       She requests these members of her care team be copied on today's visit information: Yes    PCP: Edi Ribeiro    Referring Provider:  Darrel Pepe MD  [address] [place] [id number]    /75 (BP Location: Left arm, Patient Position: Sitting, Cuff Size: Adult Large)   Pulse 61   Wt 86 kg (189 lb 11.2 oz)   SpO2 97%   BMI 31.57 kg/m      Do you need any medication refills at today's visit? No

## 2019-06-26 NOTE — PROGRESS NOTES
"    CARDIOLOGY CLINIC  Jaja Ernandez is a 79 year old female with chronic systolic heart failure, breast cancer in remission and hypertension who [place] [facility] [place] with her daughter present.     Prior visit with  Dr. Pepe was in November 2018.  She was started on Spironolactone, but from 11/14/18 telephone note - she stopped the spironolactone with nephrology's recommendations. Dr. Pepe most recently saw the patient on 5/16/19.    Today, the patient returns to clinic for follow up.  She reports feeling better overall. She says she has no SOB. No swelling in her abdomen and legs. Her appetite has been good and they are planning a trip for about a week or so [place]. She says her weight at home is 186.2 lbs. She reports she is really trying hard to stay on top of her medications and she feels a positive difference with that.  She goes to the grocery store and walks up and down the aisles without stopping. She also tries to walk around her apartment and the building especially with the weather being good. She says there are days she takes hydralazine twice a day instead of the three times a day.     She denies, CP, SOB, Palpitations, PND, syncope, near syncopal episodes.  She sleeps well at night using her two \" very flat \" pillows and says her appetite has been just fine.     PAST MEDICAL HISTORY:  Hypertension  CKD stage IV  Cardiomyopathy- EF in 2015 35%, recovered to 49% as of June 2015, then down to 20-30% June, now back to 45%    FAMILY HISTORY:  Family History   Problem Relation Age of Onset     Breast Cancer Mother 50     Cancer Mother      Asthma Other      Unknown/Adopted Father      Breast Cancer Sister 70     Cancer Sister      Hypertension Daughter      Diabetes No family hx of      Cerebrovascular Disease No family hx of      Thyroid Disease No family hx of      Glaucoma No family hx of      Macular Degeneration No family hx of      SOCIAL HISTORY: , lives alone in " apartment, daughter here with her today. Never a smoker.     CURRENT MEDICATIONS:  Outpatient Medications Prior to Visit   Medication Sig Dispense Refill     ACE/ARB/ARNI NOT PRESCRIBED, INTENTIONAL, Please choose reason not prescribed, below       amLODIPine (NORVASC) 10 MG tablet Take 1 tablet (10 mg) by mouth At Bedtime 90 tablet 3     Ascorbic Acid (VITAMIN C PO) Take 500 mg by mouth daily        aspirin 81 MG tablet Take 81 mg by mouth daily        Aspirin-Salicylamide-Caffeine (BC HEADACHE POWDER PO) Take 1 powder on the tongue every 6 hours with water as needed for headache       atorvastatin (LIPITOR) 20 MG tablet Take 1 tablet (20 mg) by mouth daily 30 tablet 3     calcium-vitamin D (CALTRATE) 600-400 MG-UNIT per tablet Take 1 tablet by mouth daily       carvedilol (COREG) 25 MG tablet Take 1 tablet (25 mg) by mouth 2 times daily (with meals) 180 tablet 3     docusate sodium (COLACE) 100 MG capsule Take 1 capsule (100 mg) by mouth 2 times daily as needed for constipation 60 capsule 0     ferrous sulfate (IRON) 325 (65 Fe) MG tablet Take 1 tablet (325 mg) by mouth 2 times daily 100 tablet 0     furosemide (LASIX) 40 MG tablet Take 1 tablet (40 mg) by mouth 2 times daily 180 tablet 1     garlic 150 MG TABS Take 150 mg by mouth daily       isosorbide mononitrate (IMDUR) 60 MG 24 hr tablet Take 1 tablet (60 mg) by mouth 2 times daily 180 tablet 3     Multiple Vitamins-Minerals (CENTRUM SILVER) per tablet Take 1 tablet by mouth daily       potassium chloride ER (K-DUR/KLOR-CON M) 20 MEQ CR tablet Take 1 tablet (20 mEq) by mouth 2 times daily 60 tablet 3     vitamin E (VITAMIN E-400) 400 UNIT capsule Take 400 Units by mouth daily        hydrALAZINE (APRESOLINE) 50 MG tablet Take 1 tablet (50 mg) by mouth 3 times daily 270 tablet 3     potassium chloride (KLOR-CON) 20 MEQ Packet Take 20 mEq by mouth 2 times daily       Facility-Administered Medications Prior to Visit   Medication Dose Route Frequency Provider Last  Rate Last Dose     sodium chloride (PF) 0.9% PF flush 10 mL  10 mL Intracatheter Once Darrel Pepe MD           ROS:  Constitutional: no fever, chills, or diaphoresis. Weight stable  ENT: no visual changes, epistaxis, vertigo  Respiratory:  As above  Cards: as above  GI: no nausea, vomiting, diarrhea, melena, or hematochezia.   : + urinary frequency, no dysuria or hematuria.   Integument: Negative for bruising, rash, or pruritis.  Neuro: No headaches, syncope, focal weakness    Musculoskeletal: Negative for gout, joint stiffness/ swelling, or muscle weakness    EXAM:  General: seated in chair, in NAD  HEENT: NC/AT, sclera anicteric, MMM    Card: RRR,  JVP not detectable at 90 degrees  Pulm: CTA B, no rales, ronchi, or wheezing.   Abdomen: ND, +BS, soft, NT  Extremities: no clubbing, cyanosis or edema.    Neurological: alert, conversant with normal speech, moving all extremities equally  Psych: pleasant mood and affect  Vascular: No carotid bruit noted.     Labs:  CMP RESULTS:  Lab Results   Component Value Date     06/26/2019    POTASSIUM 3.7 06/26/2019    CHLORIDE 103 06/26/2019    CO2 32 06/26/2019    ANIONGAP 5 06/26/2019     (H) 06/26/2019    BUN 48 (H) 06/26/2019    CR 3.81 (H) 06/26/2019    GFRESTIMATED 11 (L) 06/26/2019    GFRESTBLACK 12 (L) 06/26/2019    CHICHO 10.1 06/26/2019    BILITOTAL 0.5 08/30/2018    ALBUMIN 4.1 08/30/2018    ALKPHOS 98 08/30/2018    ALT 16 08/30/2018    AST 21 08/30/2018       DATA:  Echocardiogram 9/10/2018: Mildly reduced LV function 45-50%, RV function and size normal. Mitral annular calcification noted with mildly restricted posterior leaflet with mild to moderate MR, mild TR, moderate to severe LA enlargement, noted PFO with left to right shunt.     Assessment and Plan:   Jaja Ernandez is a pleasant 79 year old female with a past medical history including chronic diastolic and systolic heart failure who presents to CORE clinic today for follow up. 79  year old woman     Patient will  Cut back on her Lasix. She will call if she notes weight gain, swelling in abdomen/legs, increase shortness of breath. She is excited about her trip to [place] and she will have to get labs when she returns from her trip.  We also discussed the importance of following a low salt diet , she prefers My Chart for communication.  She is willing to continue the hydralazine TID.     1. Chronic diastolic and systolic heart failure secondary to NICM, likely hypertensive cardiomyopathy  Stage C, Class III  ACEi/ARB: deferred due to CKD, Afterload of hydralazine and Imdur  BB: Carvedilol 25 mg BID now- since she saw Dr. Pepe  Aldosterone antagonist: deferred due to CKD  Diuretics: euvolemic today. Cut down on Lasix   SCD prophylaxis NA EF now 45%  Sleep Apnea Evaluation: doesn't feel she snores, not interested in pursuing  NSAIDS: contraindicated, discussed with patient        #Secondary/functional mitral regurgitation, improved:  #Secondary/functional tricuspid regurgitaiton, improved:  #Pulmonary venous hypertension (WHO Group II)  All diagnosed in the setting of acute decompensated HF on her 6/29/18 TTE. All have improved with restoration of euvolemia and improvement in LVEF.   -manage HF as above     #HTN with nephropathy and cardiomyopathy:  Control improved.     #CKD5 due to hypertension/DM:  Unfortunately renal function remains persistently reduced, limiting neurohormonal therapy somewhat. Will continue to monitor, avoid nephrotoxic meds.      #Non-obstructive CAD w/o angina (based on cath from 2015): continue ASA 81 mg daily, atorvastatin 20 mg daily     Follow-up:   BMP - on 7/8/19.   Lasix 40 daily (decrease from prior)  Potassium continue  Will continue to work on taking the Hydralazine TID.   July 17 CORE . BMP         Brent STOKES, CNP

## 2019-07-03 RX ORDER — HYDRALAZINE HYDROCHLORIDE 50 MG/1
50 TABLET, FILM COATED ORAL 3 TIMES DAILY
Qty: 270 TABLET | Refills: 3 | Status: SHIPPED | OUTPATIENT
Start: 2019-07-03

## 2019-07-03 RX ORDER — FUROSEMIDE 40 MG
40 TABLET ORAL DAILY
Qty: 180 TABLET | Refills: 1 | Status: SHIPPED | OUTPATIENT
Start: 2019-07-03

## 2019-07-08 ENCOUNTER — PATIENT OUTREACH (OUTPATIENT)
Dept: CARDIOLOGY | Facility: CLINIC | Age: 79
End: 2019-07-08

## 2019-07-08 NOTE — PROGRESS NOTES
LM for patient reminding her that she was supposed to get her labs drawn today. I asked patient to get her labs drawn ASAP. Patient instructed to call back with questions or concerns.     Zuleyma Lindsay RN   Pulmonary/CORE Care Coordinator  [facility]

## 2019-07-09 NOTE — PROGRESS NOTES
Spoke with patient's daughter, she will bring her mother in for labs tomorrow in [place].     Zuleyma Lindsay RN   Pulmonary/CORE Care Coordinator  [facility]

## 2019-07-10 ENCOUNTER — PATIENT OUTREACH (OUTPATIENT)
Dept: CARDIOLOGY | Facility: CLINIC | Age: 79
End: 2019-07-10

## 2019-07-10 DIAGNOSIS — I50.22 CHRONIC SYSTOLIC CONGESTIVE HEART FAILURE (H): ICD-10-CM

## 2019-07-10 DIAGNOSIS — N18.4 CKD (CHRONIC KIDNEY DISEASE) STAGE 4, GFR 15-29 ML/MIN (H): ICD-10-CM

## 2019-07-10 DIAGNOSIS — I50.22 CHRONIC SYSTOLIC HEART FAILURE (H): ICD-10-CM

## 2019-07-10 LAB
ANION GAP SERPL CALCULATED.3IONS-SCNC: 7 MMOL/L (ref 3–14)
BUN SERPL-MCNC: 44 MG/DL (ref 7–30)
CALCIUM SERPL-MCNC: 9.2 MG/DL (ref 8.5–10.1)
CHLORIDE SERPL-SCNC: 110 MMOL/L (ref 94–109)
CO2 SERPL-SCNC: 26 MMOL/L (ref 20–32)
CREAT SERPL-MCNC: 3.71 MG/DL (ref 0.52–1.04)
GFR SERPL CREATININE-BSD FRML MDRD: 11 ML/MIN/{1.73_M2}
GLUCOSE SERPL-MCNC: 112 MG/DL (ref 70–99)
POTASSIUM SERPL-SCNC: 3.8 MMOL/L (ref 3.4–5.3)
SODIUM SERPL-SCNC: 143 MMOL/L (ref 133–144)

## 2019-07-10 PROCEDURE — 36415 COLL VENOUS BLD VENIPUNCTURE: CPT | Performed by: NURSE PRACTITIONER

## 2019-07-10 PROCEDURE — 80048 BASIC METABOLIC PNL TOTAL CA: CPT | Performed by: NURSE PRACTITIONER

## 2019-07-15 DIAGNOSIS — I25.5 ISCHEMIC CARDIOMYOPATHY: ICD-10-CM

## 2019-07-15 NOTE — TELEPHONE ENCOUNTER
Received fax on 07/15/19 from [facility] in [place] requesting refill(s) for the following medication(s):    1.   Rx Number:  [id number]   Drug:  Imdur 60 MG        Sig: Take one tablet by mouth twice daily   Prescribed Qty:  180    Last Refill:  04/14/19           Patient's Last Cardiology Appointment:  06/26/19   Patient's Next Cardiology Appointment:  07/17/19    Refill encounter routed to FK pool.  Jackeline Ferrara L.P.N.

## 2019-07-16 RX ORDER — ISOSORBIDE MONONITRATE 60 MG/1
60 TABLET, EXTENDED RELEASE ORAL 2 TIMES DAILY
Qty: 180 TABLET | Refills: 3 | Status: SHIPPED | OUTPATIENT
Start: 2019-07-16 | End: 2019-07-17

## 2019-07-16 NOTE — TELEPHONE ENCOUNTER
Signed Prescriptions:                        Disp   Refills    isosorbide mononitrate (IMDUR) 60 MG 24 hr*180 ta*3        Sig: Take 1 tablet (60 mg) by mouth 2 times daily  Authorizing Provider: KAR JEFFERY  Ordering User: BRYANNA DUNCAN    rx filled per refill protocol.  Bryanna Duncan RN

## 2019-07-17 ENCOUNTER — OFFICE VISIT (OUTPATIENT)
Dept: CARDIOLOGY | Facility: CLINIC | Age: 79
End: 2019-07-17
Payer: MEDICARE

## 2019-07-17 VITALS
HEART RATE: 68 BPM | SYSTOLIC BLOOD PRESSURE: 132 MMHG | WEIGHT: 189.9 LBS | OXYGEN SATURATION: 95 % | RESPIRATION RATE: 20 BRPM | HEIGHT: 60 IN | BODY MASS INDEX: 37.28 KG/M2 | DIASTOLIC BLOOD PRESSURE: 76 MMHG

## 2019-07-17 DIAGNOSIS — I50.22 CHRONIC SYSTOLIC CONGESTIVE HEART FAILURE (H): ICD-10-CM

## 2019-07-17 DIAGNOSIS — I10 HYPERTENSION GOAL BP (BLOOD PRESSURE) < 140/90: ICD-10-CM

## 2019-07-17 DIAGNOSIS — I50.22 CHRONIC SYSTOLIC CONGESTIVE HEART FAILURE (H): Primary | ICD-10-CM

## 2019-07-17 DIAGNOSIS — N18.4 CKD (CHRONIC KIDNEY DISEASE) STAGE 4, GFR 15-29 ML/MIN (H): ICD-10-CM

## 2019-07-17 DIAGNOSIS — I25.5 ISCHEMIC CARDIOMYOPATHY: ICD-10-CM

## 2019-07-17 LAB
ANION GAP SERPL CALCULATED.3IONS-SCNC: 7 MMOL/L (ref 3–14)
BUN SERPL-MCNC: 44 MG/DL (ref 7–30)
CALCIUM SERPL-MCNC: 9.3 MG/DL (ref 8.5–10.1)
CHLORIDE SERPL-SCNC: 107 MMOL/L (ref 94–109)
CO2 SERPL-SCNC: 27 MMOL/L (ref 20–32)
CREAT SERPL-MCNC: 3.21 MG/DL (ref 0.52–1.04)
GFR SERPL CREATININE-BSD FRML MDRD: 13 ML/MIN/{1.73_M2}
GLUCOSE SERPL-MCNC: 92 MG/DL (ref 70–99)
POTASSIUM SERPL-SCNC: 3.9 MMOL/L (ref 3.4–5.3)
SODIUM SERPL-SCNC: 141 MMOL/L (ref 133–144)

## 2019-07-17 PROCEDURE — 99214 OFFICE O/P EST MOD 30 MIN: CPT | Performed by: NURSE PRACTITIONER

## 2019-07-17 PROCEDURE — 36415 COLL VENOUS BLD VENIPUNCTURE: CPT | Performed by: NURSE PRACTITIONER

## 2019-07-17 PROCEDURE — 80048 BASIC METABOLIC PNL TOTAL CA: CPT | Performed by: NURSE PRACTITIONER

## 2019-07-17 RX ORDER — ISOSORBIDE MONONITRATE 60 MG/1
60 TABLET, EXTENDED RELEASE ORAL 2 TIMES DAILY
Qty: 180 TABLET | Refills: 3 | Status: SHIPPED | OUTPATIENT
Start: 2019-07-17

## 2019-07-17 ASSESSMENT — PAIN SCALES - GENERAL: PAINLEVEL: NO PAIN (0)

## 2019-07-17 ASSESSMENT — MIFFLIN-ST. JEOR: SCORE: 1262.88

## 2019-07-17 NOTE — NURSING NOTE
Jaja Ernandez's goals for this visit include: Return  She requests these members of her care team be copied on today's visit information: PCP    PCP: Edi Ribeiro    Referring Provider:  No referring provider defined for this encounter.    /76   Pulse 68   Resp 20   Ht 1.524 m (5')   Wt 86.1 kg (189 lb 14.4 oz)   SpO2 95%   BMI 37.09 kg/m      Do you need any medication refills at today's visit? N

## 2019-07-17 NOTE — PATIENT INSTRUCTIONS
Take your medicines every day, as directed    Changes made today:  o No changes today   Monitor Your Weight and Symptoms    Contact us if you:      Gain 2 pounds in one day or 5 pounds in one week    Feel more short of breath    Notice more leg swelling    Feel lightheadeded   Change your lifestyle    Limit Salt or Sodium:    2000 mg  Limit Fluids:    2000 mL or approximately 64 ounces  Eat a Heart Healthy Diet    Low in saturated fats  Stay Active:    Aim to move at least 150 minutes every  week         To Contact us    During Business Hours:  [phone], option # 1 (University)  Then option # 4 (medical questions)     After hours, weekends or holidays:   [phone], Option #4  Ask to speak to the On-Call Cardiologist. Inform them you are a CORE/heart failure patient at the [facility].     Use [employer] allows you to communicate directly with your heart team through secure messaging.    [employer] can be accessed any time on your phone, computer, or tablet.    If you need assistance, we'd be happy to help!         Keep your Heart Appointments:    Follow up with Dr. Pepe in August    CORE clinic in 3 months with labs prior       The following is a summary of your office visit:      Nurse contact information: Naomi Noland RN  Cardiology Care Coordinator  [phone] Phone  [phone] Fax    Appointments made today: lab follow up and core clinic follow up    Patient instructions: Jacinto Condon would like you to remember to bring in all of your medications bottles for review with each appointment.  This would include any prescription medications and over the counter medications (including vitamins) you may be taking.         If you have had any blood work, imaging or other testing completed we will be in touch within 1-2 weeks regarding the results. If you have any questions, concerns or need to schedule a follow up, please contact us at [phone]. If you are needing refills please contact  your pharmacy. For urgent after hour care please call the [facility] at [phone] or the [facility] at [phone] and ask to speak to the cardiologist on call.    It was a pleasure meeting with you today. Please let us know if there is anything else we can do for you so that we can be sure you are leaving completely satisfied with your care experience.     Your Cardiology Team at [facility]  RN Care Coordinator: Naomi DOMINGUEZ: Eleanor

## 2019-08-22 ENCOUNTER — OFFICE VISIT (OUTPATIENT)
Dept: CARDIOLOGY | Facility: CLINIC | Age: 79
End: 2019-08-22
Payer: MEDICARE

## 2019-08-22 VITALS
HEIGHT: 60 IN | DIASTOLIC BLOOD PRESSURE: 75 MMHG | SYSTOLIC BLOOD PRESSURE: 121 MMHG | HEART RATE: 55 BPM | OXYGEN SATURATION: 96 % | WEIGHT: 189 LBS | BODY MASS INDEX: 37.11 KG/M2

## 2019-08-22 DIAGNOSIS — I50.22 CHRONIC SYSTOLIC HEART FAILURE (H): ICD-10-CM

## 2019-08-22 DIAGNOSIS — I25.5 ISCHEMIC CARDIOMYOPATHY: ICD-10-CM

## 2019-08-22 DIAGNOSIS — I50.22 CHRONIC SYSTOLIC CONGESTIVE HEART FAILURE (H): Primary | ICD-10-CM

## 2019-08-22 DIAGNOSIS — I50.1 ACUTE PULMONARY EDEMA WITH CONGESTIVE HEART FAILURE (H): ICD-10-CM

## 2019-08-22 DIAGNOSIS — N18.4 CKD (CHRONIC KIDNEY DISEASE) STAGE 4, GFR 15-29 ML/MIN (H): ICD-10-CM

## 2019-08-22 DIAGNOSIS — I42.8 NONISCHEMIC CARDIOMYOPATHY (H): ICD-10-CM

## 2019-08-22 LAB
ANION GAP SERPL CALCULATED.3IONS-SCNC: 9 MMOL/L (ref 3–14)
BUN SERPL-MCNC: 42 MG/DL (ref 7–30)
CALCIUM SERPL-MCNC: 9.5 MG/DL (ref 8.5–10.1)
CHLORIDE SERPL-SCNC: 105 MMOL/L (ref 94–109)
CO2 SERPL-SCNC: 28 MMOL/L (ref 20–32)
CREAT SERPL-MCNC: 3.48 MG/DL (ref 0.52–1.04)
GFR SERPL CREATININE-BSD FRML MDRD: 12 ML/MIN/{1.73_M2}
GLUCOSE SERPL-MCNC: 117 MG/DL (ref 70–99)
POTASSIUM SERPL-SCNC: 3.8 MMOL/L (ref 3.4–5.3)
SODIUM SERPL-SCNC: 142 MMOL/L (ref 133–144)

## 2019-08-22 PROCEDURE — 80048 BASIC METABOLIC PNL TOTAL CA: CPT | Performed by: INTERNAL MEDICINE

## 2019-08-22 PROCEDURE — 99214 OFFICE O/P EST MOD 30 MIN: CPT | Performed by: INTERNAL MEDICINE

## 2019-08-22 PROCEDURE — 36415 COLL VENOUS BLD VENIPUNCTURE: CPT | Performed by: INTERNAL MEDICINE

## 2019-08-22 RX ORDER — ATORVASTATIN CALCIUM 20 MG/1
20 TABLET, FILM COATED ORAL DAILY
Qty: 90 TABLET | Refills: 3 | Status: SHIPPED | OUTPATIENT
Start: 2019-08-22 | End: 2020-12-10

## 2019-08-22 RX ORDER — POTASSIUM CHLORIDE 1500 MG/1
20 TABLET, EXTENDED RELEASE ORAL 2 TIMES DAILY
Qty: 180 TABLET | Refills: 3 | Status: SHIPPED | OUTPATIENT
Start: 2019-08-22

## 2019-08-22 ASSESSMENT — PAIN SCALES - GENERAL: PAINLEVEL: NO PAIN (0)

## 2019-08-22 ASSESSMENT — MIFFLIN-ST. JEOR: SCORE: 1258.8

## 2019-08-22 NOTE — NURSING NOTE
Jaja Ernandez's goals for this visit include:   Chief Complaint   Patient presents with     RECHECK     3 month follow up, chronic systolic congestive heart failure       She requests these members of her care team be copied on today's visit information: no    PCP: Edi Ribeiro    Referring Provider:  Darrel Pepe MD  [address] [place] [id number]    /75 (BP Location: Left arm, Patient Position: Sitting, Cuff Size: Adult Large)   Pulse 55   Ht 1.524 m (5')   Wt 85.7 kg (189 lb)   SpO2 96%   BMI 36.91 kg/m      Do you need any medication refills at today's visit? no

## 2019-08-22 NOTE — PROGRESS NOTES
"Chief complaint: Follow up of chronic systolic HF    HPI:   Jaja Ernandze is a 79 year old woman with chronic systolic HF due to NICM LVEF=24% (TTE 6/29/18), HTN, DM2, CKD4, and non-obstructive CAD who presents for follow up of HF.     Cardiac History:  She established care with me 7/2018 after her inpatient admission. My note from that time: \"She was admitted at [facility] 6/29-7/2 for decompensated HF (exertional dyspnea/cough), severe HTN (190s/130s), and TATIANA-on-CKD (Cr 1.8 > 3.5) in the setting of self-discontinuation of all of her antihypertensive meds for 3 months. She was treated with diuretics and restarted on neurohormonal HF therapy/antihypertensives. TTE in-house was notable for fall in her LVEF (24% from 50% in 2015), elevated PA pressure, and worsening function MR/TR.  She was discharged home and established care in the CORE clinic, where she has been followed with gradual up-titration of her medical therapy. She reports significant improvement in her functional capacity, and reports that she was at least once able to walk up 2 flights (32 steps), and was only short of breath at the top.\"  At her visit on 11/1/18, she had repeat TTE which showed improved LVEF to 45-50%.     Interval history:  Since her last visit, she has followed in CORE clinic and her carvedilol has been increased to 25 mg BID. At her last CORE visit on 7/17/19, she was euvolemic and no changes were made to her stable regimen of carvedilol/hydralazine/ISMN. Spironolactone was stopped after an initial trial (11/2018) due to slight interval worsening of her renal function after starting it.  She has not seen a Nephrologist since she saw Dr. Otoole in [place] on 7/9/18.   She plans to move back to [place], [place] in late October.  She reports good adherence to her medical therapy and states that she is tolerating her current regimen well.   She denies any chest pain, dyspnea above baseline, PND, orthopnea, peripheral edema, palpitations, " lightheadedness or syncope.     PAST MEDICAL HISTORY:  Past Medical History:   Diagnosis Date     Cataract      Chronic renal failure, stage 3 (moderate) (H)      Congestive heart failure, unspecified      Hypertension      Non-obstructive CAD without angina 5/19/2015     Non-obstructive CAD without angina 5/19/2015     Nonischemic cardiomyopathy (H) 8/25/2015     Other nonspecific abnormal cardiovascular system function study 5/15/2015     Systolic CHF (H)     LVEF 35-40% 3/2015       CURRENT MEDICATIONS:  Current Outpatient Medications   Medication Sig Dispense Refill     ACE/ARB/ARNI NOT PRESCRIBED, INTENTIONAL, Please choose reason not prescribed, below       amLODIPine (NORVASC) 10 MG tablet Take 1 tablet (10 mg) by mouth At Bedtime 90 tablet 3     Ascorbic Acid (VITAMIN C PO) Take 500 mg by mouth daily        aspirin 81 MG tablet Take 81 mg by mouth daily        Aspirin-Salicylamide-Caffeine (BC HEADACHE POWDER PO) Take 1 powder on the tongue every 6 hours with water as needed for headache       atorvastatin (LIPITOR) 20 MG tablet Take 1 tablet (20 mg) by mouth daily 30 tablet 3     calcium-vitamin D (CALTRATE) 600-400 MG-UNIT per tablet Take 1 tablet by mouth daily       carvedilol (COREG) 25 MG tablet Take 1 tablet (25 mg) by mouth 2 times daily (with meals) 180 tablet 3     docusate sodium (COLACE) 100 MG capsule Take 1 capsule (100 mg) by mouth 2 times daily as needed for constipation 60 capsule 0     ferrous sulfate (IRON) 325 (65 Fe) MG tablet Take 1 tablet (325 mg) by mouth 2 times daily 100 tablet 0     furosemide (LASIX) 40 MG tablet Take 1 tablet (40 mg) by mouth daily 180 tablet 1     garlic 150 MG TABS Take 150 mg by mouth daily       hydrALAZINE (APRESOLINE) 50 MG tablet Take 1 tablet (50 mg) by mouth 3 times daily 270 tablet 3     isosorbide mononitrate (IMDUR) 60 MG 24 hr tablet Take 1 tablet (60 mg) by mouth 2 times daily 180 tablet 3     Multiple Vitamins-Minerals (CENTRUM SILVER) per tablet  Take 1 tablet by mouth daily       potassium chloride ER (K-DUR/KLOR-CON M) 20 MEQ CR tablet Take 1 tablet (20 mEq) by mouth 2 times daily 60 tablet 3     vitamin E (VITAMIN E-400) 400 UNIT capsule Take 400 Units by mouth daily          PAST SURGICAL HISTORY:  Past Surgical History:   Procedure Laterality Date     ANGIOGRAM      3/2015     BRAIN TUMOR RESECTION  7-8-1997    Benign brain tumor     csection       HYSTERECTOMY TOTAL ABDOMINAL      benign condition       ALLERGIES:     Allergies   Allergen Reactions     Demerol [Meperidine] Nausea and Vomiting       FAMILY HISTORY:  Family History   Problem Relation Age of Onset     Breast Cancer Mother 50     Cancer Mother      Asthma Other      Unknown/Adopted Father      Breast Cancer Sister 70     Cancer Sister      Hypertension Daughter      Diabetes No family hx of      Cerebrovascular Disease No family hx of      Thyroid Disease No family hx of      Glaucoma No family hx of      Macular Degeneration No family hx of      SOCIAL HISTORY:  Social History     Tobacco Use     Smoking status: Never Smoker     Smokeless tobacco: Never Used   Substance Use Topics     Alcohol use: No     Drug use: No       ROS:   A comprehensive 14 point review of systems is negative other than as mentioned in HPI.    Exam:   /75 (BP Location: Left arm, Patient Position: Sitting, Cuff Size: Adult Large)   Pulse 55   Ht 1.524 m (5')   Wt 85.7 kg (189 lb)   SpO2 96%   BMI 36.91 kg/m    GENERAL APPEARANCE: healthy, alert and no distress  EYES: no icterus, no xanthelasmas  ENT: normal palate, mucosa moist, no central cyanosis  NECK:  JVP~8 cmH2O  RESPIRATORY: lungs clear to auscultation - no rales, rhonchi or wheezes, no use of accessory muscles, no retractions, respirations are unlabored, normal respiratory rate  CARDIOVASCULAR: regular rhythm, +2/6 LLSB holosystolic murmur. No s3/s4.   GI: soft, non tender, without hepatosplenomegaly, no masses palpable, bowel sounds normal,  aorta not enlarged by palpation, no abdominal bruits  EXTREMITIES: peripheral pulses normal, no edema, no bruits  NEURO: alert and oriented to person/place/time, normal speech, gait and affect  VASC: Radial pulses 2+ bilaterally.  SKIN: no ecchymoses, no rashes.  PSYCH: cooperative, affect appropriate.     Labs:  Reviewed. Of note:  BMP today (8/30/18): Cr 2.97 (<3.6<3.0<3.3 (6/29); 1.89 on 10/27/16.)      Testing/Procedures:    TTE 9/10/18:  Normal LV size with mildly reduced LV function, LVEF=45-50%. GLS -12%.  Normal RV size/function.  Mild-to-moderate MR. Mild TR. PASP~34 mmHg. RAP~3 mmHg.  Small PFO (seen on prior TTE from 2015.)  Compared with 6/29/18: LVEF has improved (previously 24%), MR/TR have decreased, and PA/RA pressures have improved.     Assessment and Plan:   79 year old woman with chronic systolic HF due to NICM LVEF=24% (TTE 6/29/18), HTN, DM2, CKD5, and non-obstructive CAD, now with improving LVEF=45-50% (TTE 8/30/18) on neurohormonal therapy.     #Chronic systolic HF due to NICM (likely hypertensive cardiomyopathy), with improving LVEF:  LVEF=24% on 6/29 (from 50% in 2015), improved to 45-50% on TTE 9/2018.   NYHA class II-III, stage C. Clinically euvolemic and well-perfused on today's exam.   -Beta blocker: continue carvedilol to 25 mg BID   -ACEI/ARB/ARNI: relatively contraindicated (CKD5.)    -yesenia antagonist: relatively contraindicated (slightly worsened renal function after trial of therapy 11/2018)  -afterload reduction: continue hydralazine/ISMN  -SCD prophylaxis: not indicated (LVEF>35%)  -CRT: not a candidate (narrow QRS)  -follow up with CORE clinic 10/16/19 as scheduled  -repeat TTE to reassess LV function and valvular disease  -instructed her to establish care with a Cardiologist upon her move to [place].     #Secondary/functional mitral regurgitation, improved:  #Secondary/functional tricuspid regurgitaiton, improved:  #Pulmonary venous hypertension (WHO Group II)  All diagnosed in  the setting of acute decompensated HF on her 6/29/18 TTE. All have improved with restoration of euvolemia and improvement in LVEF.   -manage HF as above    #HTN with nephropathy and cardiomyopathy:  Control improved on present therapy.     #CKD5 due to hypertension/DM:  Unfortunately renal function remains persistently reduced, limiting neurohormonal therapy somewhat. Will continue to monitor, avoid nephrotoxic meds.   -instructed her to follow up with Nephrology prior to her planned move in October and to re-establish care in [place].     #Non-obstructive CAD w/o angina (based on cath from 2015): continue ASA 81 mg daily, atorvastatin 20 mg daily    The patient states understanding and is agreeable with plan.     Darrel Pepe MD  Cardiology

## 2019-08-22 NOTE — PATIENT INSTRUCTIONS
1. Echocardiogram within the next month.  2. Follow up with Nephrologist.  3. Follow up with CORE clinic 10/16/19 as scheduled.

## 2019-09-10 ENCOUNTER — ANCILLARY PROCEDURE (OUTPATIENT)
Dept: CARDIOLOGY | Facility: CLINIC | Age: 79
End: 2019-09-10
Attending: INTERNAL MEDICINE
Payer: MEDICARE

## 2019-09-10 DIAGNOSIS — I50.22 CHRONIC SYSTOLIC CONGESTIVE HEART FAILURE (H): ICD-10-CM

## 2019-09-10 DIAGNOSIS — I42.8 NONISCHEMIC CARDIOMYOPATHY (H): ICD-10-CM

## 2019-09-10 PROCEDURE — 93306 TTE W/DOPPLER COMPLETE: CPT | Performed by: INTERNAL MEDICINE

## 2019-09-10 RX ADMIN — Medication 3 ML: at 12:45

## 2019-10-11 ENCOUNTER — DOCUMENTATION ONLY (OUTPATIENT)
Dept: LAB | Facility: CLINIC | Age: 79
End: 2019-10-11

## 2019-10-15 ENCOUNTER — OFFICE VISIT (OUTPATIENT)
Dept: NEPHROLOGY | Facility: CLINIC | Age: 79
End: 2019-10-15
Payer: MEDICARE

## 2019-10-15 VITALS
OXYGEN SATURATION: 98 % | DIASTOLIC BLOOD PRESSURE: 73 MMHG | HEART RATE: 66 BPM | WEIGHT: 185 LBS | SYSTOLIC BLOOD PRESSURE: 134 MMHG | BODY MASS INDEX: 36.13 KG/M2

## 2019-10-15 DIAGNOSIS — N18.4 CKD (CHRONIC KIDNEY DISEASE) STAGE 4, GFR 15-29 ML/MIN (H): ICD-10-CM

## 2019-10-15 DIAGNOSIS — I10 HYPERTENSION GOAL BP (BLOOD PRESSURE) < 140/90: ICD-10-CM

## 2019-10-15 DIAGNOSIS — E66.01 MORBID OBESITY (H): ICD-10-CM

## 2019-10-15 DIAGNOSIS — N18.30 ANEMIA IN STAGE 3 CHRONIC KIDNEY DISEASE (H): ICD-10-CM

## 2019-10-15 DIAGNOSIS — R25.2 LEG CRAMPS: Primary | ICD-10-CM

## 2019-10-15 DIAGNOSIS — I50.22 CHRONIC SYSTOLIC HEART FAILURE (H): ICD-10-CM

## 2019-10-15 DIAGNOSIS — N18.30 CKD (CHRONIC KIDNEY DISEASE) STAGE 3, GFR 30-59 ML/MIN (H): ICD-10-CM

## 2019-10-15 DIAGNOSIS — D63.1 ANEMIA IN STAGE 3 CHRONIC KIDNEY DISEASE (H): ICD-10-CM

## 2019-10-15 DIAGNOSIS — E87.6 HYPOKALEMIA: ICD-10-CM

## 2019-10-15 DIAGNOSIS — N18.4 CKD (CHRONIC KIDNEY DISEASE) STAGE 4, GFR 15-29 ML/MIN (H): Primary | ICD-10-CM

## 2019-10-15 LAB
ALBUMIN SERPL-MCNC: 4 G/DL (ref 3.4–5)
ANION GAP SERPL CALCULATED.3IONS-SCNC: 7 MMOL/L (ref 3–14)
BUN SERPL-MCNC: 49 MG/DL (ref 7–30)
CALCIUM SERPL-MCNC: 9.9 MG/DL (ref 8.5–10.1)
CHLORIDE SERPL-SCNC: 102 MMOL/L (ref 94–109)
CO2 SERPL-SCNC: 31 MMOL/L (ref 20–32)
CREAT SERPL-MCNC: 3.49 MG/DL (ref 0.52–1.04)
FERRITIN SERPL-MCNC: 54 NG/ML (ref 8–252)
GFR SERPL CREATININE-BSD FRML MDRD: 12 ML/MIN/{1.73_M2}
GLUCOSE SERPL-MCNC: 94 MG/DL (ref 70–99)
HGB BLD-MCNC: 9.6 G/DL (ref 11.7–15.7)
IRON SATN MFR SERPL: 20 % (ref 15–46)
IRON SERPL-MCNC: 51 UG/DL (ref 35–180)
MAGNESIUM SERPL-MCNC: 2.5 MG/DL (ref 1.6–2.3)
PHOSPHATE SERPL-MCNC: 3.9 MG/DL (ref 2.5–4.5)
POTASSIUM SERPL-SCNC: 3.7 MMOL/L (ref 3.4–5.3)
PTH-INTACT SERPL-MCNC: 92 PG/ML (ref 18–80)
SODIUM SERPL-SCNC: 140 MMOL/L (ref 133–144)
TIBC SERPL-MCNC: 261 UG/DL (ref 240–430)

## 2019-10-15 PROCEDURE — 83970 ASSAY OF PARATHORMONE: CPT | Performed by: INTERNAL MEDICINE

## 2019-10-15 PROCEDURE — 83735 ASSAY OF MAGNESIUM: CPT | Performed by: INTERNAL MEDICINE

## 2019-10-15 PROCEDURE — 85018 HEMOGLOBIN: CPT | Performed by: INTERNAL MEDICINE

## 2019-10-15 PROCEDURE — 99203 OFFICE O/P NEW LOW 30 MIN: CPT | Performed by: INTERNAL MEDICINE

## 2019-10-15 PROCEDURE — 80069 RENAL FUNCTION PANEL: CPT | Performed by: INTERNAL MEDICINE

## 2019-10-15 PROCEDURE — 82728 ASSAY OF FERRITIN: CPT | Performed by: INTERNAL MEDICINE

## 2019-10-15 PROCEDURE — 83540 ASSAY OF IRON: CPT | Performed by: INTERNAL MEDICINE

## 2019-10-15 PROCEDURE — 36415 COLL VENOUS BLD VENIPUNCTURE: CPT | Performed by: INTERNAL MEDICINE

## 2019-10-15 PROCEDURE — 82306 VITAMIN D 25 HYDROXY: CPT | Performed by: INTERNAL MEDICINE

## 2019-10-15 PROCEDURE — 83550 IRON BINDING TEST: CPT | Performed by: INTERNAL MEDICINE

## 2019-10-15 RX ORDER — FERROUS SULFATE 325(65) MG
325 TABLET ORAL
Qty: 90 TABLET | Refills: 1 | Status: SHIPPED | OUTPATIENT
Start: 2019-10-15

## 2019-10-15 ASSESSMENT — PAIN SCALES - GENERAL: PAINLEVEL: NO PAIN (0)

## 2019-10-15 NOTE — PATIENT INSTRUCTIONS
1. Recommend starting ferrous sulfate 325 mg daily (script sent in)  2. Monitor weights daily and write these down  3. Recommend stopping the headache powder - contains aspirin and we want to avoid using aspirin for pain  4. Stop the calcium with vitamin D   5. Establish care with cardiology and nephrology when you get to [place].

## 2019-10-15 NOTE — NURSING NOTE
Jaja Ernandez's goals for this visit include:   Chief Complaint   Patient presents with     Consult     CKD  RP: My       She requests these members of her care team be copied on today's visit information: no    PCP: Edi Ribeiro    Referring Provider:  Arturo Sawyer MD  [address] [place] [phone]    /73 (BP Location: Right arm, Patient Position: Sitting, Cuff Size: Adult Large)   Pulse 66   Wt 83.9 kg (185 lb)   SpO2 98%   BMI 36.13 kg/m      Do you need any medication refills at today's visit? No    Florecita Bang LPN

## 2019-10-16 ENCOUNTER — OFFICE VISIT (OUTPATIENT)
Dept: CARDIOLOGY | Facility: CLINIC | Age: 79
End: 2019-10-16
Payer: MEDICARE

## 2019-10-16 VITALS — HEART RATE: 64 BPM | DIASTOLIC BLOOD PRESSURE: 74 MMHG | OXYGEN SATURATION: 98 % | SYSTOLIC BLOOD PRESSURE: 121 MMHG

## 2019-10-16 DIAGNOSIS — I50.22 CHRONIC SYSTOLIC CONGESTIVE HEART FAILURE (H): Primary | ICD-10-CM

## 2019-10-16 DIAGNOSIS — I27.22 PULMONARY HYPERTENSION DUE TO LEFT HEART DISEASE (H): ICD-10-CM

## 2019-10-16 DIAGNOSIS — N18.30 CKD (CHRONIC KIDNEY DISEASE) STAGE 3, GFR 30-59 ML/MIN (H): ICD-10-CM

## 2019-10-16 DIAGNOSIS — I10 HYPERTENSION GOAL BP (BLOOD PRESSURE) < 140/90: ICD-10-CM

## 2019-10-16 DIAGNOSIS — I42.8 NONISCHEMIC CARDIOMYOPATHY (H): ICD-10-CM

## 2019-10-16 PROCEDURE — 99214 OFFICE O/P EST MOD 30 MIN: CPT | Performed by: NURSE PRACTITIONER

## 2019-10-16 ASSESSMENT — PAIN SCALES - GENERAL: PAINLEVEL: NO PAIN (0)

## 2019-10-16 NOTE — PROGRESS NOTES
"  [facility]  Jaja Ernandez is a 79 year old female with chronic systolic heart failure, breast cancer in remission and hypertension who presents to [facility] for follow up with her daughter present.     Prior visit with  Dr. Pepe was on 8/22/19. At that visit Dr. Pepe wanted an echo, the echo has since been done and showed improved EF.  Dr. Pepe also recommended a follow up with nephrology and [facility]. He is aware of her move to [place].      Today, the patient returns to clinic for follow up.  She reports feeling \"really good.\"  She reports no   SOB. No swelling in her abdomen and legs. Her appetite has been good. She takes 40 mg of Lasix daily.  She is taking all her medications and she feels the positive effect. She is moving to [place], [place] within the week. She will have to establish care locally. She sleeps well at night using her two \" very flat \" pillows and says her appetite has been just fine.     She denies, CP, SOB, Palpitations, PND, syncope, near syncopal episodes.       FAMILY HISTORY:  Family History   Problem Relation Age of Onset     Breast Cancer Mother 50     Cancer Mother      Asthma Other      Unknown/Adopted Father      Breast Cancer Sister 70     Cancer Sister      Hypertension Daughter      Diabetes No family hx of      Cerebrovascular Disease No family hx of      Thyroid Disease No family hx of      Glaucoma No family hx of      Macular Degeneration No family hx of      SOCIAL HISTORY: , lives alone in apartment, daughter here with her today. Never a smoker.     CURRENT MEDICATIONS:  Outpatient Medications Prior to Visit   Medication Sig Dispense Refill     amLODIPine (NORVASC) 10 MG tablet Take 1 tablet (10 mg) by mouth At Bedtime 90 tablet 3     Ascorbic Acid (VITAMIN C PO) Take 500 mg by mouth daily        aspirin 81 MG tablet Take 81 mg by mouth daily        atorvastatin (LIPITOR) 20 MG tablet Take 1 tablet (20 mg) by mouth daily 90 tablet 3     " carvedilol (COREG) 25 MG tablet Take 1 tablet (25 mg) by mouth 2 times daily (with meals) 180 tablet 3     docusate sodium (COLACE) 100 MG capsule Take 1 capsule (100 mg) by mouth 2 times daily as needed for constipation 60 capsule 0     ferrous sulfate (FEROSUL) 325 (65 Fe) MG tablet Take 1 tablet (325 mg) by mouth daily (with breakfast) 90 tablet 1     furosemide (LASIX) 40 MG tablet Take 1 tablet (40 mg) by mouth daily 180 tablet 1     garlic 150 MG TABS Take 150 mg by mouth daily       hydrALAZINE (APRESOLINE) 50 MG tablet Take 1 tablet (50 mg) by mouth 3 times daily 270 tablet 3     isosorbide mononitrate (IMDUR) 60 MG 24 hr tablet Take 1 tablet (60 mg) by mouth 2 times daily 180 tablet 3     Multiple Vitamins-Minerals (CENTRUM SILVER) per tablet Take 1 tablet by mouth daily       potassium chloride ER (K-DUR/KLOR-CON M) 20 MEQ CR tablet Take 1 tablet (20 mEq) by mouth 2 times daily 180 tablet 3     vitamin E (VITAMIN E-400) 400 UNIT capsule Take 400 Units by mouth daily        ACE/ARB/ARNI NOT PRESCRIBED, INTENTIONAL, Please choose reason not prescribed, below (Patient not taking: Reported on 10/15/2019)       No facility-administered medications prior to visit.        ROS:  Constitutional: no fever, chills, or diaphoresis. Weight stable  ENT: no visual changes, epistaxis, vertigo  Respiratory:  As above  Cards: as above  GI: no nausea, vomiting, diarrhea, melena, or hematochezia.   : no urinary frequency, no dysuria or hematuria.   Integument: Negative for bruising, rash, or pruritis.  Neuro: No headaches, syncope, focal weakness    Musculoskeletal: Negative for gout, joint stiffness/ swelling, or muscle weakness    EXAM:  General: seated in chair, in NAD  HEENT: NC/AT, sclera anicteric, MMM    Card: RRR,  JVP not detectable at 90 degrees  Pulm: CTA B, no rales, ronchi, or wheezing.   Abdomen: ND, +BS, soft, NT  Extremities: no clubbing, cyanosis or edema.    Neurological: alert, conversant with normal  speech, moving all extremities equally  Psych: pleasant mood and affect  Vascular: No carotid bruit noted.     Labs:  CMP RESULTS:  Lab Results   Component Value Date     10/15/2019    POTASSIUM 3.7 10/15/2019    CHLORIDE 102 10/15/2019    CO2 31 10/15/2019    ANIONGAP 7 10/15/2019    GLC 94 10/15/2019    BUN 49 (H) 10/15/2019    CR 3.49 (H) 10/15/2019    GFRESTIMATED 12 (L) 10/15/2019    GFRESTBLACK 14 (L) 10/15/2019    CHICHO 9.9 10/15/2019    BILITOTAL 0.5 08/30/2018    ALBUMIN 4.0 10/15/2019    ALKPHOS 98 08/30/2018    ALT 16 08/30/2018    AST 21 08/30/2018       DATA:  Echo 9/10/2019  Left Ventricle  Left ventricular wall thickness is normal. Thickening of the anterobasal  septum is present. Mild left ventricular dilation is present. Grade I or early  diastolic dysfunction. Global peak LV longitudinal strain is averaged at -19%.  This is within reported normal limits (normal <-18%). The Ejection Fraction is  estimated at 60-65%. No regional wall motion abnormalities are seen.     Right Ventricle  Right ventricular function, chamber size, wall motion, and thickness are  normal.     Atria  The right atria appears normal. Severe left atrial enlargement is present. ASD  vs PFO with left to right shunting as assessed by color doppler.     Mitral Valve  Mild mitral annular calcification is present. Moderate mitral insufficiency is  present.        Aortic Valve  Aortic valve is normal in structure and function. The aortic valve is  tricuspid. No aortic regurgitation is present.     Tricuspid Valve  The tricuspid valve is normal. Moderate tricuspid insufficiency is present.  The right ventricular systolic pressure is approximated at 33.2 mmHg plus the  right atrial pressure.     Pulmonic Valve  The pulmonic valve is normal. Trace pulmonic insufficiency is present.     Vessels  The aorta root is normal. Dilation of the inferior vena cava is present with  abnormal respiratory variation in diameter. IVC diameter >2.1  cm collapsing  <50% with sniff suggests a high RA pressure estimated at 15 mmHg or greater.     Pericardium  No pericardial effusion is present.    Assessment and Plan:   Jaja Ernandez is a pleasant 79 year old female with a past medical history including chronic diastolic and systolic heart failure who presents to CORE clinic today for follow up.  She is excited to be feeling so much better.  We will keep her medications as is no changes.  We also discussed the importance of following a low salt diet , she prefers My Chart for communication.  She is moving to [place] and will establish her care with a cardiologist.     1. Chronic diastolic and systolic heart failure secondary to NICM, likely hypertensive cardiomyopathy  Stage C, Class III  ACEi/ARB: deferred due to CKD, Afterload of hydralazine and Imdur  BB: Carvedilol 25 mg BID   Aldosterone antagonist: deferred due to CKD  Diuretics: euvolemic today.   SCD prophylaxis TTE shows LVEF has fully recovered to 60-65%,   Sleep Apnea Evaluation: doesn't feel she snores, not interested in pursuing  NSAIDS: contraindicated, discussed with patient        #Secondary/functional mitral regurgitation, improved:  #Secondary/functional tricuspid regurgitaiton, improved:  #Pulmonary venous hypertension (WHO Group II)  All diagnosed in the setting of acute decompensated HF on her 6/29/18 TTE. All have improved with restoration of euvolemia and improvement in LVEF.   -manage HF as above     #HTN with nephropathy and cardiomyopathy:  Control improved.     #CKD5 due to hypertension/DM:  Unfortunately renal function remains persistently reduced, limiting neurohormonal therapy somewhat. Will continue to monitor, avoid nephrotoxic meds.      #Non-obstructive CAD w/o angina (based on cath from 2015): continue ASA 81 mg daily, atorvastatin 20 mg daily     Follow-up:  Patient will establish care in [place], [place].        Brent STOKES, CNP

## 2019-10-16 NOTE — NURSING NOTE
Jaja Ernandez's goals for this visit include:   Chief Complaint   Patient presents with     RECHECK     CORE       She requests these members of her care team be copied on today's visit information:     PCP: Edi Ribeiro    Referring Provider:  No referring provider defined for this encounter.    /74 (BP Location: Left arm, Patient Position: Sitting, Cuff Size: Adult Large)   Pulse 64   SpO2 98%     Do you need any medication refills at today's visit? No

## 2019-10-16 NOTE — PATIENT INSTRUCTIONS
Take your medicines every day, as directed    Changes made today:  o No changes      Monitor Your Weight and Symptoms    Contact us if you:      Gain 2 pounds in one day or 5 pounds in one week    Feel more short of breath    Notice more leg swelling    Feel lightheadeded   Change your lifestyle    Limit Salt or Sodium:    2000 mg  Limit Fluids:    2000 mL or approximately 64 ounces  Eat a Heart Healthy Diet    Low in saturated fats  Stay Active:    Aim to move at least 150 minutes every  week         To Contact us    During Business Hours:  [phone], option # 1 (University)  Then option # 4 (medical questions)     After hours, weekends or holidays:   [phone], Option #4  Ask to speak to the On-Call Cardiologist. Inform them you are a CORE/heart failure patient at the [facility].     Use [employer] allows you to communicate directly with your heart team through secure messaging.    enrich-in can be accessed any time on your phone, computer, or tablet.    If you need assistance, we'd be happy to help!         Keep your Heart Appointments:    Cardiologist in [place]

## 2019-10-21 LAB
DEPRECATED CALCIDIOL+CALCIFEROL SERPL-MC: <55 UG/L (ref 20–75)
VITAMIN D2 SERPL-MCNC: <5 UG/L
VITAMIN D3 SERPL-MCNC: 50 UG/L

## 2019-11-05 NOTE — PROGRESS NOTES
October 15, 2019    I was asked to see this patient by Dr. Pepe for evaluation of CKD.     CC: CKD    HPI: Jaja Ernandez is a 79 year old female who presents for evaluation of CKD. Ms. Ernandez's hx is significant for hypertension as well as nonischemic heart failure and chronic kidney disease.  Her creatinine has been 2.83-3.81 for the past year and really dating back to June 2018.  Her correlating GFR has been 12-19 in the past year and 14 today.  Previous ultrasound showed the right kidney at 7.9 cm and the left at 11.5 cm.  She had difficulty with hypertensive emergency in July 2018 but most recently    - History of Hematuria: no  - Swelling: no  - Hx of UTIs: no  - Hx of stones: no  - Rashes/Joint pain: no  - Family hx of kidney disease: no blood relative -  and son-in-law  - NSAID use: yes       Allergies   Allergen Reactions     Demerol [Meperidine] Nausea and Vomiting       amLODIPine (NORVASC) 10 MG tablet, Take 1 tablet (10 mg) by mouth At Bedtime  Ascorbic Acid (VITAMIN C PO), Take 500 mg by mouth daily   aspirin 81 MG tablet, Take 81 mg by mouth daily   atorvastatin (LIPITOR) 20 MG tablet, Take 1 tablet (20 mg) by mouth daily  carvedilol (COREG) 25 MG tablet, Take 1 tablet (25 mg) by mouth 2 times daily (with meals)  docusate sodium (COLACE) 100 MG capsule, Take 1 capsule (100 mg) by mouth 2 times daily as needed for constipation  furosemide (LASIX) 40 MG tablet, Take 1 tablet (40 mg) by mouth daily  garlic 150 MG TABS, Take 150 mg by mouth daily  hydrALAZINE (APRESOLINE) 50 MG tablet, Take 1 tablet (50 mg) by mouth 3 times daily  isosorbide mononitrate (IMDUR) 60 MG 24 hr tablet, Take 1 tablet (60 mg) by mouth 2 times daily  Multiple Vitamins-Minerals (CENTRUM SILVER) per tablet, Take 1 tablet by mouth daily  potassium chloride ER (K-DUR/KLOR-CON M) 20 MEQ CR tablet, Take 1 tablet (20 mEq) by mouth 2 times daily  vitamin E (VITAMIN E-400) 400 UNIT capsule, Take 400 Units by mouth daily    ACE/ARB/ARNI NOT PRESCRIBED, INTENTIONAL,, Please choose reason not prescribed, below (Patient not taking: Reported on 10/15/2019)    No current facility-administered medications on file prior to visit.       Past Medical History:   Diagnosis Date     Cataract      Chronic renal failure, stage 3 (moderate) (H)      Congestive heart failure, unspecified      Hypertension      Non-obstructive CAD without angina 5/19/2015     Non-obstructive CAD without angina 5/19/2015     Nonischemic cardiomyopathy (H) 8/25/2015     Other nonspecific abnormal cardiovascular system function study 5/15/2015     Systolic CHF (H)     LVEF 35-40% 3/2015       Past Surgical History:   Procedure Laterality Date     ANGIOGRAM      3/2015     BRAIN TUMOR RESECTION  7-8-1997    Benign brain tumor     csection       HYSTERECTOMY TOTAL ABDOMINAL      benign condition       Social History     Tobacco Use     Smoking status: Never Smoker     Smokeless tobacco: Never Used   Substance Use Topics     Alcohol use: No     Drug use: No       Family History   Problem Relation Age of Onset     Breast Cancer Mother 50     Cancer Mother      Asthma Other      Unknown/Adopted Father      Breast Cancer Sister 70     Cancer Sister      Hypertension Daughter      Diabetes No family hx of      Cerebrovascular Disease No family hx of      Thyroid Disease No family hx of      Glaucoma No family hx of      Macular Degeneration No family hx of        ROS: A 12 system review of systems was negative other than noted here or above.     Exam:  /73 (BP Location: Right arm, Patient Position: Sitting, Cuff Size: Adult Large)   Pulse 66   Wt 83.9 kg (185 lb)   SpO2 98%   BMI 36.13 kg/m      GENERAL APPEARANCE: alert and no distress  EYES: PERRL, no scleral icterus  HENT: mouth without ulcers or lesions  NECK: supple, no adenopathy  RESP: lungs clear to auscultation   CV: regular rhythm, normal rate, no rub  SKIN: no rash  NEURO: mentation intact and speech  normal  PSYCH: affect normal/bright    Results:    Office Visit on 10/15/2019   Component Date Value Ref Range Status     Magnesium 10/15/2019 2.5* 1.6 - 2.3 mg/dL Final   Orders Only on 10/15/2019   Component Date Value Ref Range Status     Sodium 10/15/2019 140  133 - 144 mmol/L Final     Potassium 10/15/2019 3.7  3.4 - 5.3 mmol/L Final     Chloride 10/15/2019 102  94 - 109 mmol/L Final     Carbon Dioxide 10/15/2019 31  20 - 32 mmol/L Final     Anion Gap 10/15/2019 7  3 - 14 mmol/L Final     Glucose 10/15/2019 94  70 - 99 mg/dL Final     Urea Nitrogen 10/15/2019 49* 7 - 30 mg/dL Final     Creatinine 10/15/2019 3.49* 0.52 - 1.04 mg/dL Final     GFR Estimate 10/15/2019 12* >60 mL/min/[1.73_m2] Final    Comment: Non  GFR Calc  Starting 12/18/2018, serum creatinine based estimated GFR (eGFR) will be   calculated using the Chronic Kidney Disease Epidemiology Collaboration   (CKD-EPI) equation.       GFR Estimate If Black 10/15/2019 14* >60 mL/min/[1.73_m2] Final    Comment:  GFR Calc  Starting 12/18/2018, serum creatinine based estimated GFR (eGFR) will be   calculated using the Chronic Kidney Disease Epidemiology Collaboration   (CKD-EPI) equation.       Calcium 10/15/2019 9.9  8.5 - 10.1 mg/dL Final     Phosphorus 10/15/2019 3.9  2.5 - 4.5 mg/dL Final     Albumin 10/15/2019 4.0  3.4 - 5.0 g/dL Final     Parathyroid Hormone Intact 10/15/2019 92* 18 - 80 pg/mL Final     Hemoglobin 10/15/2019 9.6* 11.7 - 15.7 g/dL Final     25 OH Vit D2 10/15/2019 <5  ug/L Final     25 OH Vit D3 10/15/2019 50  ug/L Final     25 OH Vit D total 10/15/2019 <55  20 - 75 ug/L Final    Comment: Season, race, dietary intake, and treatment affect the concentration of   25-hydroxy-Vitamin D. Values may decrease during winter months and increase   during summer months. Values 20-29 ug/L may indicate Vitamin D insufficiency   and values <20 ug/L may indicate Vitamin D deficiency.  This test was developed and its  performance characteristics determined by the   [facility],  [facility]. It has   not been cleared or approved by the FDA. The laboratory is regulated under   CLIA as qualified to perform high-complexity testing. This test is used for   clinical purposes. It should not be regarded as investigational or for   research.       Iron 10/15/2019 51  35 - 180 ug/dL Final     Iron Binding Cap 10/15/2019 261  240 - 430 ug/dL Final     Iron Saturation Index 10/15/2019 20  15 - 46 % Final     Ferritin 10/15/2019 54  8 - 252 ng/mL Final       Assessment/Plan:   1. CKD STage 5: She is at risk for kidney disease in the setting of long-standing hypertension as well as cardiac disease.  Her right kidney was found to be 7.9 cm with 11.5 cm kidney on the left which appears to have compensated for the small right-sided kidney.  She reports today that she is not interested in hemodialysis or transplant in the future.  Educated to avoid her headache powder as it does contain aspirin 80 want to avoid all NSAIDs at this time.  She will be moving the back to [place] in the near future and I have encouraged her to establish care with nephrology as soon as she gets there.    2. Hypertension: Blood pressure is well controlled    3. Anemia: Hemoglobin is 9.6.  Iron saturation came back at 20% and therefore have recommended starting an iron supplement.  No need for NAKUL therapy at this time but instead hope that the hemoglobin will improve with the iron supplement.    4. CHF: stable currently on lasix    5. Hypokalemia: while on diuretic - stable on current potassium dose.     6. SEcondary hyperparathyroidism, renal: PTH 92 - vitamin D level is at goal.     Patient Instructions   1. Recommend starting ferrous sulfate 325 mg daily (script sent in)  2. Monitor weights daily and write these down  3. Recommend stopping the headache powder - contains aspirin and we want to avoid using aspirin for  pain  4. Stop the calcium with vitamin D   5. Establish care with cardiology and nephrology when you get to [place].          Arturo Sawyer, DO

## 2020-03-02 ENCOUNTER — HEALTH MAINTENANCE LETTER (OUTPATIENT)
Age: 80
End: 2020-03-02

## 2020-05-08 DIAGNOSIS — I10 HYPERTENSION GOAL BP (BLOOD PRESSURE) < 140/90: ICD-10-CM

## 2020-05-08 RX ORDER — AMLODIPINE BESYLATE 10 MG/1
10 TABLET ORAL AT BEDTIME
Qty: 90 TABLET | Refills: 3 | Status: CANCELLED | OUTPATIENT
Start: 2020-05-08

## 2020-05-11 NOTE — TELEPHONE ENCOUNTER
amLODIPine (NORVASC) 10 MG tablet      Last Written Prescription Date:  5/16/2019  Last Fill Quantity: 90,   # refills: 3  Last Office Visit : 10/16/2019  Future Office visit:  none    Routing refill request to provider for review/approval because:  Failed protocol: abnormal lab- Creatinine.    Creatinine   Date Value Ref Range Status   10/15/2019 3.49 (H) 0.52 - 1.04 mg/dL Final

## 2020-09-28 DIAGNOSIS — I25.5 ISCHEMIC CARDIOMYOPATHY: ICD-10-CM

## 2020-09-28 NOTE — TELEPHONE ENCOUNTER
Pending Prescriptions:                       Disp   Refills    isosorbide mononitrate (IMDUR) 60 MG 24 h*180 ta*3            Sig: Take 1 tablet (60 mg) by mouth 2 times daily      Last Visit 10/16/2019w/ Brent  Last Filled 4/16/2020  Quantity 180  Req. Received 9/28/2020    KAREEN Gomez

## 2020-09-29 RX ORDER — ISOSORBIDE MONONITRATE 60 MG/1
60 TABLET, EXTENDED RELEASE ORAL 2 TIMES DAILY
Qty: 180 TABLET | Refills: 3 | OUTPATIENT
Start: 2020-09-29

## 2020-12-09 DIAGNOSIS — I50.1 ACUTE PULMONARY EDEMA WITH CONGESTIVE HEART FAILURE (H): ICD-10-CM

## 2020-12-10 RX ORDER — ATORVASTATIN CALCIUM 20 MG/1
20 TABLET, FILM COATED ORAL DAILY
Qty: 90 TABLET | Refills: 0 | Status: SHIPPED | OUTPATIENT
Start: 2020-12-10

## 2020-12-10 NOTE — TELEPHONE ENCOUNTER
atorvastatin (LIPITOR) 20 MG tablet  Last Written Prescription Date:  8/22/2019  Last Fill Quantity: 90,   # refills: 3  Last Office Visit : 10/16/2019  Future Office visit:  None   Routing refill request to provider for review/approval because:  Office Visit and Labs Due:  LDL     90 day yu sent to pharm     Clinic notified         Brooklyn Hough RN  Central Triage Red Flags/Med Refills

## 2020-12-20 ENCOUNTER — HEALTH MAINTENANCE LETTER (OUTPATIENT)
Age: 80
End: 2020-12-20

## 2021-04-18 ENCOUNTER — HEALTH MAINTENANCE LETTER (OUTPATIENT)
Age: 81
End: 2021-04-18

## 2021-10-03 ENCOUNTER — HEALTH MAINTENANCE LETTER (OUTPATIENT)
Age: 81
End: 2021-10-03

## 2021-11-17 NOTE — PLAN OF CARE
Problem: Patient Care Overview  Goal: Plan of Care/Patient Progress Review  Monitor shows SR to ST with first degree AVB. Blood pressure elevated, got evening meds and at 2330 /100 so patient given PRN Hydralazine 20 mg IV. Recheck /79. Patient had headache for much of day and evening shift, tylenol not effective, patient tried ice pack and then found some relief with hot packs. Patient had BM on evening shift, up independently to commode for urine. Patient calm, oriented, and pleasant. Patient NPO since midnight for renal ultrasound today. Continue cares and monitoring comfort, labs and vitals. Anticipate discharge in next several days.        LEs limited 2/2 pain/bilateral upper extremity Active ROM was WFL (within functional limits)/bilateral  lower extremity Active ROM was WFL (within functional limits)

## 2022-05-15 ENCOUNTER — HEALTH MAINTENANCE LETTER (OUTPATIENT)
Age: 82
End: 2022-05-15

## 2022-09-10 ENCOUNTER — HEALTH MAINTENANCE LETTER (OUTPATIENT)
Age: 82
End: 2022-09-10

## 2023-06-03 ENCOUNTER — HEALTH MAINTENANCE LETTER (OUTPATIENT)
Age: 83
End: 2023-06-03

## 2025-01-20 NOTE — PROGRESS NOTES
"  [facility]  Jaja Ernandez is a 79 year old female with chronic systolic heart failure, breast cancer in remission and hypertension who presents to [facility] for follow up with her daughter present.     Prior visit with  Dr. Pepe was in November 2018.  She was started on Spironolactone, but from 11/14/18 telephone note - she stopped the spironolactone with nephrology's recommendations.     Today, the patient returns to clinic for follow up.  She reports feeling better overall. She says she has no SOB. No swelling in her abdomen and legs. Her appetite has been good. She had a very good trip out to [place] where she stayed with her son.  She says her weight at home is 187-188 lbs. She takes 40 mg of Lasix daily.  She reports she is really trying hard to stay on top of her medications and she feels a positive difference with that.  She says there are days she takes hydralazine twice a day instead of the three times a day. Her BP's at home are between 127-136 systolic and 73-81 diastolic. She is very happy with this as she says for a long time she noted higher BP's.  She is very pleased with how she feels overall.     She denies, CP, SOB, Palpitations, PND, syncope, near syncopal episodes.  She sleeps well at night using her two \" very flat \" pillows and says her appetite has been just fine.     PAST MEDICAL HISTORY:  Hypertension  CKD stage IV  Cardiomyopathy- EF in 2015 35%, recovered to 49% as of June 2015, then down to 20-30% June, now back to 45%    FAMILY HISTORY:  Family History   Problem Relation Age of Onset     Breast Cancer Mother 50     Cancer Mother      Asthma Other      Unknown/Adopted Father      Breast Cancer Sister 70     Cancer Sister      Hypertension Daughter      Diabetes No family hx of      Cerebrovascular Disease No family hx of      Thyroid Disease No family hx of      Glaucoma No family hx of      Macular Degeneration No family hx of      SOCIAL HISTORY: , lives alone in apartment, " .no   daughter here with her today. Never a smoker.     CURRENT MEDICATIONS:  Outpatient Medications Prior to Visit   Medication Sig Dispense Refill     ACE/ARB/ARNI NOT PRESCRIBED, INTENTIONAL, Please choose reason not prescribed, below       amLODIPine (NORVASC) 10 MG tablet Take 1 tablet (10 mg) by mouth At Bedtime 90 tablet 3     Ascorbic Acid (VITAMIN C PO) Take 500 mg by mouth daily        aspirin 81 MG tablet Take 81 mg by mouth daily        Aspirin-Salicylamide-Caffeine (BC HEADACHE POWDER PO) Take 1 powder on the tongue every 6 hours with water as needed for headache       atorvastatin (LIPITOR) 20 MG tablet Take 1 tablet (20 mg) by mouth daily 30 tablet 3     calcium-vitamin D (CALTRATE) 600-400 MG-UNIT per tablet Take 1 tablet by mouth daily       carvedilol (COREG) 25 MG tablet Take 1 tablet (25 mg) by mouth 2 times daily (with meals) 180 tablet 3     docusate sodium (COLACE) 100 MG capsule Take 1 capsule (100 mg) by mouth 2 times daily as needed for constipation 60 capsule 0     ferrous sulfate (IRON) 325 (65 Fe) MG tablet Take 1 tablet (325 mg) by mouth 2 times daily 100 tablet 0     furosemide (LASIX) 40 MG tablet Take 1 tablet (40 mg) by mouth daily 180 tablet 1     garlic 150 MG TABS Take 150 mg by mouth daily       hydrALAZINE (APRESOLINE) 50 MG tablet Take 1 tablet (50 mg) by mouth 3 times daily 270 tablet 3     Multiple Vitamins-Minerals (CENTRUM SILVER) per tablet Take 1 tablet by mouth daily       potassium chloride ER (K-DUR/KLOR-CON M) 20 MEQ CR tablet Take 1 tablet (20 mEq) by mouth 2 times daily 60 tablet 3     vitamin E (VITAMIN E-400) 400 UNIT capsule Take 400 Units by mouth daily        isosorbide mononitrate (IMDUR) 60 MG 24 hr tablet Take 1 tablet (60 mg) by mouth 2 times daily 180 tablet 3     Facility-Administered Medications Prior to Visit   Medication Dose Route Frequency Provider Last Rate Last Dose     sodium chloride (PF) 0.9% PF flush 10 mL  10 mL Intracatheter Once Darrel Pepe  MD Sukhjinder           ROS:  Constitutional: no fever, chills, or diaphoresis. Weight stable  ENT: no visual changes, epistaxis, vertigo  Respiratory:  As above  Cards: as above  GI: no nausea, vomiting, diarrhea, melena, or hematochezia.   : no urinary frequency, no dysuria or hematuria.   Integument: Negative for bruising, rash, or pruritis.  Neuro: No headaches, syncope, focal weakness    Musculoskeletal: Negative for gout, joint stiffness/ swelling, or muscle weakness    EXAM:  General: seated in chair, in NAD  HEENT: NC/AT, sclera anicteric, MMM    Card: RRR,  JVP not detectable at 45 degrees  Pulm: CTA B, no rales, ronchi, or wheezing.   Abdomen: ND, +BS, soft, NT  Extremities: no clubbing, cyanosis or edema.    Neurological: alert, conversant with normal speech, moving all extremities equally  Psych: pleasant mood and affect  Vascular: No carotid bruit noted.     Labs:  CMP RESULTS:  Lab Results   Component Value Date     07/17/2019    POTASSIUM 3.9 07/17/2019    CHLORIDE 107 07/17/2019    CO2 27 07/17/2019    ANIONGAP 7 07/17/2019    GLC 92 07/17/2019    BUN 44 (H) 07/17/2019    CR 3.21 (H) 07/17/2019    GFRESTIMATED 13 (L) 07/17/2019    GFRESTBLACK 15 (L) 07/17/2019    CHICHO 9.3 07/17/2019    BILITOTAL 0.5 08/30/2018    ALBUMIN 4.1 08/30/2018    ALKPHOS 98 08/30/2018    ALT 16 08/30/2018    AST 21 08/30/2018       DATA:  Echocardiogram 9/10/2018: Mildly reduced LV function 45-50%, RV function and size normal. Mitral annular calcification noted with mildly restricted posterior leaflet with mild to moderate MR, mild TR, moderate to severe LA enlargement, noted PFO with left to right shunt.     Assessment and Plan:   Jaja Ernandez is a pleasant 79 year old female with a past medical history including chronic diastolic and systolic heart failure who presents to [facility] today for follow up. She will call if she notes weight gain, swelling in abdomen/legs, increase shortness of breath. She is excited to  be feeling so much better.  We will keep her medications as is no changes.  We also discussed the importance of following a low salt diet , she prefers My Chart for communication.      1. Chronic diastolic and systolic heart failure secondary to NICM, likely hypertensive cardiomyopathy  Stage C, Class III  ACEi/ARB: deferred due to CKD, Afterload of hydralazine and Imdur  BB: Carvedilol 25 mg BID now- since she saw Dr. Pepe  Aldosterone antagonist: deferred due to CKD  Diuretics: euvolemic today.   SCD prophylaxis NA EF now 45%  Sleep Apnea Evaluation: doesn't feel she snores, not interested in pursuing  NSAIDS: contraindicated, discussed with patient        #Secondary/functional mitral regurgitation, improved:  #Secondary/functional tricuspid regurgitaiton, improved:  #Pulmonary venous hypertension (WHO Group II)  All diagnosed in the setting of acute decompensated HF on her 6/29/18 TTE. All have improved with restoration of euvolemia and improvement in LVEF.   -manage HF as above     #HTN with nephropathy and cardiomyopathy:  Control improved.     #CKD5 due to hypertension/DM:  Unfortunately renal function remains persistently reduced, limiting neurohormonal therapy somewhat. Will continue to monitor, avoid nephrotoxic meds.      #Non-obstructive CAD w/o angina (based on cath from 2015): continue ASA 81 mg daily, atorvastatin 20 mg daily     Follow-up:  No medication changes today   Dr. Pepe in August  [facility] in 3 months with labs prior        Brent STOKES, CNP

## 2025-07-10 NOTE — PATIENT INSTRUCTIONS
1. Start spironolactone (Aldactone) 12.5 mg daily.  2. Stop potassium.   3. Lab work (kidney function/potassium) next week.   4. Follow up with [facility] in 1 month.  5. Follow up with Dr. Pepe in 3 months.     Please call [phone] and ask for Cardiology with any new symptoms, questions, or concerns.     For urgent after hour care, please call the [facility] at [phone], or the [facility] at [phone], and ask to speak to the cardiologist on call.    When to Call Your Doctor  Call your doctor if you have any of the following:  Changed or worsened chest pain.  Chest pain that started within the past 2 months and is now more severe.   Chest pain that happens 3 or more times per day.   Chest pain that suddenly becomes more frequent or severe, lasts longer, or is brought on by less exertion than before.   Chest pain that occurs at rest, with no obvious exertion or stress. It might wake you from sleep.  Call 911 or other emergency services if you have CAD that has been diagnosed by a doctor and you have chest pain that doesn't go away after using your home treatment plan for angina.  When in Doubt:  If you aren't sure if your symptoms are serious or decide not to call 911, call our [facility] at [phone] or the [facility] at [phone] and ask to speak to the cardiologist on call. They can help you decide if your pain is an emergency or not.      
100